# Patient Record
Sex: FEMALE | Race: BLACK OR AFRICAN AMERICAN | NOT HISPANIC OR LATINO | Employment: FULL TIME | URBAN - METROPOLITAN AREA
[De-identification: names, ages, dates, MRNs, and addresses within clinical notes are randomized per-mention and may not be internally consistent; named-entity substitution may affect disease eponyms.]

---

## 2023-11-09 ENCOUNTER — OFFICE VISIT (OUTPATIENT)
Dept: OBSTETRICS AND GYNECOLOGY | Facility: CLINIC | Age: 43
End: 2023-11-09
Payer: COMMERCIAL

## 2023-11-09 VITALS
HEIGHT: 69 IN | DIASTOLIC BLOOD PRESSURE: 70 MMHG | SYSTOLIC BLOOD PRESSURE: 130 MMHG | WEIGHT: 293 LBS | BODY MASS INDEX: 43.4 KG/M2

## 2023-11-09 DIAGNOSIS — N92.0 MENORRHAGIA WITH REGULAR CYCLE: ICD-10-CM

## 2023-11-09 DIAGNOSIS — D50.0 IRON DEFICIENCY ANEMIA DUE TO CHRONIC BLOOD LOSS: ICD-10-CM

## 2023-11-09 DIAGNOSIS — D21.9 FIBROID: Primary | ICD-10-CM

## 2023-11-09 PROBLEM — I61.0 BASAL GANGLIA HEMORRHAGE (MULTI): Status: ACTIVE | Noted: 2023-11-09

## 2023-11-09 PROBLEM — N85.2 ENLARGED UTERUS: Status: ACTIVE | Noted: 2023-11-09

## 2023-11-09 PROBLEM — O09.892: Status: ACTIVE | Noted: 2023-11-09

## 2023-11-09 PROBLEM — N80.00 UTERUS, ADENOMYOSIS: Status: ACTIVE | Noted: 2023-11-09

## 2023-11-09 PROBLEM — Z86.718: Status: ACTIVE | Noted: 2023-11-09

## 2023-11-09 PROBLEM — B97.7 HPV IN FEMALE: Status: ACTIVE | Noted: 2023-11-09

## 2023-11-09 PROBLEM — I10 HYPERTENSION: Status: ACTIVE | Noted: 2023-11-09

## 2023-11-09 PROBLEM — R87.810 ASCUS WITH POSITIVE HIGH RISK HPV CERVICAL: Status: ACTIVE | Noted: 2023-11-09

## 2023-11-09 PROBLEM — D25.9 FIBROID UTERUS: Status: ACTIVE | Noted: 2023-11-09

## 2023-11-09 PROBLEM — R87.610 ASCUS WITH POSITIVE HIGH RISK HPV CERVICAL: Status: ACTIVE | Noted: 2023-11-09

## 2023-11-09 PROBLEM — I61.9 HEMORRHAGIC STROKE (MULTI): Status: ACTIVE | Noted: 2023-11-09

## 2023-11-09 PROCEDURE — 3078F DIAST BP <80 MM HG: CPT | Performed by: OBSTETRICS & GYNECOLOGY

## 2023-11-09 PROCEDURE — 3075F SYST BP GE 130 - 139MM HG: CPT | Performed by: OBSTETRICS & GYNECOLOGY

## 2023-11-09 PROCEDURE — 1036F TOBACCO NON-USER: CPT | Performed by: OBSTETRICS & GYNECOLOGY

## 2023-11-09 PROCEDURE — 99214 OFFICE O/P EST MOD 30 MIN: CPT | Performed by: OBSTETRICS & GYNECOLOGY

## 2023-11-09 RX ORDER — ATORVASTATIN CALCIUM 10 MG/1
10 TABLET, FILM COATED ORAL DAILY
COMMUNITY

## 2023-11-09 RX ORDER — FERROUS SULFATE 325(65) MG
TABLET ORAL
Status: ON HOLD | COMMUNITY
Start: 2021-09-21 | End: 2024-02-01 | Stop reason: ENTERED-IN-ERROR

## 2023-11-09 RX ORDER — LABETALOL 200 MG/1
200 TABLET, FILM COATED ORAL 2 TIMES DAILY
COMMUNITY
Start: 2019-10-14 | End: 2023-11-09 | Stop reason: ALTCHOICE

## 2023-11-09 RX ORDER — VITAMIN E 268 MG
2 CAPSULE ORAL NIGHTLY
COMMUNITY
End: 2023-11-09 | Stop reason: ALTCHOICE

## 2023-11-09 RX ORDER — AMLODIPINE BESYLATE 10 MG/1
10 TABLET ORAL DAILY
COMMUNITY
End: 2023-11-09 | Stop reason: ALTCHOICE

## 2023-11-09 RX ORDER — IBUPROFEN 100 MG/5ML
SUSPENSION, ORAL (FINAL DOSE FORM) ORAL
COMMUNITY
End: 2023-11-09 | Stop reason: ALTCHOICE

## 2023-11-09 RX ORDER — GLUC/MSM/COLGN2/HYAL/ANTIARTH3 375-375-20
TABLET ORAL
COMMUNITY
End: 2023-11-09 | Stop reason: ALTCHOICE

## 2023-11-09 RX ORDER — WARFARIN SODIUM 5 MG/1
15 TABLET ORAL DAILY
COMMUNITY
Start: 2023-04-11 | End: 2024-02-07 | Stop reason: HOSPADM

## 2023-11-09 RX ORDER — ASCORBIC ACID 250 MG
250 TABLET ORAL DAILY
COMMUNITY
Start: 2021-09-21

## 2023-11-09 RX ORDER — MULTIVITAMIN
1 TABLET ORAL DAILY
COMMUNITY

## 2023-11-09 RX ORDER — HYDROCHLOROTHIAZIDE 12.5 MG/1
12.5 CAPSULE ORAL DAILY
COMMUNITY
Start: 2023-03-15 | End: 2023-12-21 | Stop reason: ALTCHOICE

## 2023-11-09 RX ORDER — ASPIRIN 325 MG
1 TABLET, DELAYED RELEASE (ENTERIC COATED) ORAL WEEKLY
COMMUNITY
Start: 2021-09-21 | End: 2023-12-21 | Stop reason: ALTCHOICE

## 2023-11-09 RX ORDER — HYDROCHLOROTHIAZIDE 25 MG/1
25 TABLET ORAL
COMMUNITY
Start: 2023-05-01

## 2023-11-09 RX ORDER — LABETALOL 300 MG/1
1 TABLET, FILM COATED ORAL 2 TIMES DAILY
COMMUNITY

## 2023-11-09 RX ORDER — ACETAMINOPHEN 500 MG
1000 TABLET ORAL EVERY 8 HOURS
COMMUNITY
Start: 2019-10-03 | End: 2023-11-09 | Stop reason: ALTCHOICE

## 2023-11-09 RX ORDER — AMLODIPINE BESYLATE 5 MG/1
1 TABLET ORAL DAILY
Status: ON HOLD | COMMUNITY
Start: 2019-10-14 | End: 2024-02-01 | Stop reason: ENTERED-IN-ERROR

## 2023-11-09 RX ORDER — AMOXICILLIN 250 MG
2 CAPSULE ORAL 2 TIMES DAILY
COMMUNITY
Start: 2019-10-14 | End: 2023-11-09 | Stop reason: ALTCHOICE

## 2023-11-09 RX ORDER — GLUCOSAMINE/CHONDR SU A SOD 750-600 MG
2 TABLET ORAL NIGHTLY
COMMUNITY
Start: 2021-09-21 | End: 2023-11-09 | Stop reason: ALTCHOICE

## 2023-11-09 RX ORDER — WARFARIN 10 MG/1
TABLET ORAL
COMMUNITY
End: 2023-11-09 | Stop reason: ALTCHOICE

## 2023-11-09 NOTE — PROGRESS NOTES
Subjective   Patient ID: Katarina Merino is a 43 y.o. female who presents for Consult (Wants Hysterectomy ).      Patient partner has had a vasectomy  Cycles are every month but last for 6 to 7 days  Is on Coumadin due to her history of a stroke  Would like a hysterectomy  Is also anemic - is doing iron transfusions weekly  Using a pad and changing every 60-90 min on heavy days  Is dizzy    Discussed with patient we will need to evaluate the lining of the uterus  We will schedule EMB hysteroscopy after her next cycle  Cannot take Advil but will take Tylenol prior to the procedure    Is not interested in an IUD or progesterone for cycle control  Discussed the possibility of Depo-Provera if her biopsy comes back normal  Ablation versus hysterectomy reviewed with patient.  She like hysterectomy  Would like to have it done soon however my first available is January 24.  Patient will consider    ROS  All Normal Review of Systems  Constitutional: no fever, no chills, no recent weight gain, no recent weight loss and no fatigue.    Gastrointestinal: no abdominal pain, no constipation, no nausea, no diarrhea and no vomiting.    Genitourinary: no dysuria, no urinary incontinence, no vaginal dryness, no vaginal itching, no dyspareunia, no pelvic pain, no dysmenorrhea, no sexual problems, no change in urinary frequency, no vaginal discharge, no unexplained vaginal bleeding and no lesion/sore.    Breasts: No masses.  No nipple discharge.  No redness    Imaging:  us 2021  FINDINGS:  UTERUS:  The uterus measures 9.4 x 8.7 x 9.1 cm. Heterogeneous hypoechoic  appearing myometrium with areas of pencil shadowing artifact and  subendometrial cystic foci. Suspected pedunculated subserosal  leiomyoma in the left aspect of the fundus measures 9.7 cm.     ENDOMETRIUM:  Normal thickness, measuring 9 mm.     RIGHT ADNEXA:  The right ovary is not seen.     LEFT ADNEXA:  The left ovary is not seen.      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  Is getting weekly iron transfusions  Discussed with patient options moving forward including medical and surgical options.  She is not interested in an IUD or progesterone at this time  Would like a hysterectomy.  Is not interested in ablation  We need to evaluate the lining of the uterus prior.  We will schedule EMB hysteroscopy to evaluate the lining of the uterus after her next menstrual cycle  We will take Tylenol prior to the procedure  We can consider possibly Depo-Provera to minimize her bleeding until her upcoming surgery    First date available for surgery is January 24.  Patient will consider her options and follow-up    Thank you again for your visit to our office today

## 2023-11-10 ENCOUNTER — TELEPHONE (OUTPATIENT)
Dept: OBSTETRICS AND GYNECOLOGY | Facility: CLINIC | Age: 43
End: 2023-11-10
Payer: COMMERCIAL

## 2023-11-10 NOTE — TELEPHONE ENCOUNTER
Katarina McPherson called evaluated her options and would like to continue with surgery for January 24, 2023.      TANISHA MENCHACA MA

## 2023-11-13 ENCOUNTER — PREP FOR PROCEDURE (OUTPATIENT)
Dept: OBSTETRICS AND GYNECOLOGY | Facility: CLINIC | Age: 43
End: 2023-11-13
Payer: COMMERCIAL

## 2023-11-13 DIAGNOSIS — D21.9 FIBROID: Primary | ICD-10-CM

## 2023-11-13 DIAGNOSIS — N92.0 MENORRHAGIA WITH REGULAR CYCLE: ICD-10-CM

## 2023-12-19 ENCOUNTER — APPOINTMENT (OUTPATIENT)
Dept: RADIOLOGY | Facility: CLINIC | Age: 43
End: 2023-12-19
Payer: COMMERCIAL

## 2023-12-19 DIAGNOSIS — Z12.31 SCREENING MAMMOGRAM FOR BREAST CANCER: ICD-10-CM

## 2023-12-21 ENCOUNTER — PROCEDURE VISIT (OUTPATIENT)
Dept: OBSTETRICS AND GYNECOLOGY | Facility: CLINIC | Age: 43
End: 2023-12-21
Payer: COMMERCIAL

## 2023-12-21 VITALS
DIASTOLIC BLOOD PRESSURE: 86 MMHG | BODY MASS INDEX: 43.4 KG/M2 | HEIGHT: 69 IN | SYSTOLIC BLOOD PRESSURE: 150 MMHG | WEIGHT: 293 LBS

## 2023-12-21 DIAGNOSIS — Z32.02 PREGNANCY TEST NEGATIVE: ICD-10-CM

## 2023-12-21 DIAGNOSIS — D21.9 FIBROID: ICD-10-CM

## 2023-12-21 DIAGNOSIS — D50.0 IRON DEFICIENCY ANEMIA DUE TO CHRONIC BLOOD LOSS: ICD-10-CM

## 2023-12-21 DIAGNOSIS — Z32.02 ENCOUNTER FOR PREGNANCY TEST WITH RESULT NEGATIVE: ICD-10-CM

## 2023-12-21 DIAGNOSIS — N92.0 MENORRHAGIA WITH REGULAR CYCLE: Primary | ICD-10-CM

## 2023-12-21 LAB — PREGNANCY TEST URINE, POC: NEGATIVE

## 2023-12-21 PROCEDURE — 81025 URINE PREGNANCY TEST: CPT | Performed by: OBSTETRICS & GYNECOLOGY

## 2023-12-21 PROCEDURE — 0753T DGTZ GLS MCRSCP SLD LEVEL IV: CPT

## 2023-12-21 PROCEDURE — 88305 TISSUE EXAM BY PATHOLOGIST: CPT

## 2023-12-21 PROCEDURE — 58558 HYSTEROSCOPY BIOPSY: CPT | Performed by: OBSTETRICS & GYNECOLOGY

## 2023-12-21 PROCEDURE — 88305 TISSUE EXAM BY PATHOLOGIST: CPT | Performed by: PATHOLOGY

## 2023-12-21 NOTE — PROGRESS NOTES
EMB hysteroscopy  Patient with menorrhagia and fibroids  Is on Coumadin due to history of a stroke postpartum    Questions answered and consent signed    Procedure:  Speculum placed  Betadine prep of cervix  Anterior lip of cervix grasped with ring forcep  Uterus sounded to 9 cm  Hysteroscope inserted  Saline uses medium  Normal ostia seen bilaterally  No irregularities to cavity  Hysteroscope used to remove saline via suction  Hysteroscope removed  Patient tolerated procedure well  No complications    Endometrial biopsy completedPatient ID: Katarina Merino is a 43 y.o. female.    Endometrial biopsy    Date/Time: 12/21/2023 2:53 PM    Performed by: Kenroy Naidu MD  Authorized by: Kenroy Naidu MD    Consent:     Consent obtained: written    Consent given by: patient  Indications:     Indications: abnormal uterine bleeding    Procedure:     Prepped with: Betadine    Tenaculum used: no      A local block was performed: no      Cervix dilated: no      Number of passes: 1  Findings:     Cervix: normal      Specimen collected: specimen collected and sent to pathology      Patient tolerance: tolerated well, no immediate complications      Acute for your visit to our office today  I am glad you tolerated procedures well  The results will return in 2 to 3 weeks.  I look forward to seeing you for your preop visit as you have surgery scheduled for January 24

## 2023-12-30 LAB
LABORATORY COMMENT REPORT: NORMAL
PATH REPORT.FINAL DX SPEC: NORMAL
PATH REPORT.GROSS SPEC: NORMAL
PATH REPORT.RELEVANT HX SPEC: NORMAL
PATH REPORT.TOTAL CANCER: NORMAL

## 2024-01-08 ENCOUNTER — PREP FOR PROCEDURE (OUTPATIENT)
Dept: OBSTETRICS AND GYNECOLOGY | Facility: CLINIC | Age: 44
End: 2024-01-08

## 2024-01-08 ENCOUNTER — OFFICE VISIT (OUTPATIENT)
Dept: OBSTETRICS AND GYNECOLOGY | Facility: CLINIC | Age: 44
End: 2024-01-08
Payer: COMMERCIAL

## 2024-01-08 VITALS
HEIGHT: 69 IN | BODY MASS INDEX: 43.4 KG/M2 | DIASTOLIC BLOOD PRESSURE: 72 MMHG | WEIGHT: 293 LBS | SYSTOLIC BLOOD PRESSURE: 130 MMHG

## 2024-01-08 DIAGNOSIS — N92.0 MENORRHAGIA WITH REGULAR CYCLE: Primary | ICD-10-CM

## 2024-01-08 DIAGNOSIS — D21.9 FIBROID: ICD-10-CM

## 2024-01-08 DIAGNOSIS — D50.0 IRON DEFICIENCY ANEMIA DUE TO CHRONIC BLOOD LOSS: ICD-10-CM

## 2024-01-08 PROCEDURE — 99214 OFFICE O/P EST MOD 30 MIN: CPT | Performed by: OBSTETRICS & GYNECOLOGY

## 2024-01-08 PROCEDURE — 3075F SYST BP GE 130 - 139MM HG: CPT | Performed by: OBSTETRICS & GYNECOLOGY

## 2024-01-08 PROCEDURE — 3078F DIAST BP <80 MM HG: CPT | Performed by: OBSTETRICS & GYNECOLOGY

## 2024-01-08 PROCEDURE — 1036F TOBACCO NON-USER: CPT | Performed by: OBSTETRICS & GYNECOLOGY

## 2024-01-08 NOTE — PROGRESS NOTES
Robotic assisted total laparoscopic hysterectomy bilateral salpingectomy cystoscopy and tap block on January history Of Present Illness  Katarina Merino is a 43 y.o. female presenting for Preop visit.  Patient is scheduled for robotic assisted total laparoscopic hysterectomy bilateral salpingectomy cystoscopy and tap block on .  It was 9 x 9 x 8 cm fibroid uterus and menorrhagia.  Patient is also on Coumadin due to history of stroke    Will have preadmission testing prior to surgery  Bowel prep day prior to surgery  Risks and alternatives reviewed with patient.  Questions answered and consent signed.  Past Medical History  Stroke postpartum    Surgical History  She has a past surgical history that includes Umbilical hernia repair (2016) and CT angio head w and wo IV contrast (10/8/2019).   C/d x1    OB History  OB History    Para Term  AB Living   2 2           SAB IAB Ectopic Multiple Live Births           2      # Outcome Date GA Lbr Alcides/2nd Weight Sex Delivery Anes PTL Lv   2 Para      Vag-Spont      1 Para      Vag-Spont          Social History  She reports that she has never smoked. She has never used smokeless tobacco. No history on file for alcohol use and drug use.    Family History  Family History   Problem Relation Name Age of Onset    Diabetes Mother      Hypertension Father      Breast cancer Other Aunt         Allergies  Penicillins    Review of Systems     Physical Exam  General: No distress.  Alert and oriented  Heart: Regular rate and rhythm  Lungs: Clear to auscultation bilaterally  Abdomen: Soft, nontender, nondistended  External Genitalia:  no masses.  no erythema.  no discharge noted.  Vagina:  no masses.  no discharge noted.    Cervix: no masses.    Uterus:  normal size and contour.  no masses. palpated  Adnexa: R: no masses, non tender.    Adnexa: L: no masses.  non tender  Extremities: No swelling  Psych: No sadness.         Last Recorded Vitals  There were no  vitals taken for this visit.    Relevant Results  Medications    Current Outpatient Medications:     amLODIPine (Norvasc) 5 mg tablet, Take 1 tablet (5 mg) by mouth once daily., Disp: , Rfl:     ascorbic acid (Vitamin C) 250 mg tablet, TAKE 1 TABLET BY MOUTH EVERY DAY WITH IRON SUPPLEMENT, Disp: , Rfl:     atorvastatin (Lipitor) 10 mg tablet, Take 1 tablet (10 mg) by mouth once daily., Disp: , Rfl:     ferrous sulfate 325 (65 Fe) MG tablet, TAKE 2 TABLETS BY MOUTH EVERY DAY WITH VITAMIN-CAPSULE TABLET, Disp: , Rfl:     hydroCHLOROthiazide (HYDRODiuril) 25 mg tablet, Take 1 tablet (25 mg) by mouth once daily., Disp: , Rfl:     labetalol (Normodyne) 300 mg tablet, Take 1 tablet (300 mg) by mouth 2 times a day., Disp: , Rfl:     multivitamin tablet, Multivitamins TABS Refills: 0, Disp: , Rfl:     warfarin (Coumadin) 5 mg tablet, Take 15 mg every day OR AS DIRECTED BY COUMADIN CLINIC, Disp: , Rfl:     Imaging    UTERUS:  The uterus measures 9.4 x 8.7 x 9.1 cm. Heterogeneous hypoechoic  appearing myometrium with areas of pencil shadowing artifact and  subendometrial cystic foci. Suspected pedunculated subserosal  leiomyoma in the left aspect of the fundus measures 9.7 cm.     ENDOMETRIUM:  Normal thickness, measuring 9 mm.     RIGHT ADNEXA:  The right ovary is not seen.     LEFT ADNEXA:  The left ovary is not seen.     CUL DE SAC:  Trace pelvic free fluid.       Path:  Endometrium, biopsy:  -- Scant fragments of proliferative endometrium.        Assessment and Plan:  1) Preop visit    Thank you for your visit to our office today  Today we discussed her surgery which is scheduled on January 24.You are scheduled for a robotic assisted total laparoscopic hysterectomy cystoscopy bilateral salpingectomy tap block.  Your preop testing through preadmission testing  Do not take any NSAIDs for a week prior to surgery  Bowel prep day prior to surgery  Nothing to eat or drink after midnight the night prior to surgery  This will be  outpatient surgery at [facility]. You will need a responsible adult to bring into the hospital, take you home, spend the night of surgery at home with you  We discussed risk of surgery which include infection hemorrhage injury to internal organs laparotomy anesthesia and death  I encourage pelvic rest and 20 pound restriction for 8 weeks after surgery  Please follow-up 2 weeks after surgery    Thank you again for your visit to our office today     I spent 35 minutes in the professional and overall care of this patient.      Kenroy Naidu MD

## 2024-01-08 NOTE — H&P (VIEW-ONLY)
Robotic assisted total laparoscopic hysterectomy bilateral salpingectomy cystoscopy and tap block on January history Of Present Illness  Katarina Merino is a 43 y.o. female presenting for Preop visit.  Patient is scheduled for robotic assisted total laparoscopic hysterectomy bilateral salpingectomy cystoscopy and tap block on .  It was 9 x 9 x 8 cm fibroid uterus and menorrhagia.  Patient is also on Coumadin due to history of stroke    Will have preadmission testing prior to surgery  Bowel prep day prior to surgery  Risks and alternatives reviewed with patient.  Questions answered and consent signed.  Past Medical History  Stroke postpartum    Surgical History  She has a past surgical history that includes Umbilical hernia repair (2016) and CT angio head w and wo IV contrast (10/8/2019).   C/d x1    OB History  OB History    Para Term  AB Living   2 2           SAB IAB Ectopic Multiple Live Births           2      # Outcome Date GA Lbr Alcides/2nd Weight Sex Delivery Anes PTL Lv   2 Para      Vag-Spont      1 Para      Vag-Spont          Social History  She reports that she has never smoked. She has never used smokeless tobacco. No history on file for alcohol use and drug use.    Family History  Family History   Problem Relation Name Age of Onset    Diabetes Mother      Hypertension Father      Breast cancer Other Aunt         Allergies  Penicillins    Review of Systems     Physical Exam  General: No distress.  Alert and oriented  Heart: Regular rate and rhythm  Lungs: Clear to auscultation bilaterally  Abdomen: Soft, nontender, nondistended  External Genitalia:  no masses.  no erythema.  no discharge noted.  Vagina:  no masses.  no discharge noted.    Cervix: no masses.    Uterus:  normal size and contour.  no masses. palpated  Adnexa: R: no masses, non tender.    Adnexa: L: no masses.  non tender  Extremities: No swelling  Psych: No sadness.         Last Recorded Vitals  There were no  vitals taken for this visit.    Relevant Results  Medications    Current Outpatient Medications:     amLODIPine (Norvasc) 5 mg tablet, Take 1 tablet (5 mg) by mouth once daily., Disp: , Rfl:     ascorbic acid (Vitamin C) 250 mg tablet, TAKE 1 TABLET BY MOUTH EVERY DAY WITH IRON SUPPLEMENT, Disp: , Rfl:     atorvastatin (Lipitor) 10 mg tablet, Take 1 tablet (10 mg) by mouth once daily., Disp: , Rfl:     ferrous sulfate 325 (65 Fe) MG tablet, TAKE 2 TABLETS BY MOUTH EVERY DAY WITH VITAMIN-CAPSULE TABLET, Disp: , Rfl:     hydroCHLOROthiazide (HYDRODiuril) 25 mg tablet, Take 1 tablet (25 mg) by mouth once daily., Disp: , Rfl:     labetalol (Normodyne) 300 mg tablet, Take 1 tablet (300 mg) by mouth 2 times a day., Disp: , Rfl:     multivitamin tablet, Multivitamins TABS Refills: 0, Disp: , Rfl:     warfarin (Coumadin) 5 mg tablet, Take 15 mg every day OR AS DIRECTED BY COUMADIN CLINIC, Disp: , Rfl:     Imaging    UTERUS:  The uterus measures 9.4 x 8.7 x 9.1 cm. Heterogeneous hypoechoic  appearing myometrium with areas of pencil shadowing artifact and  subendometrial cystic foci. Suspected pedunculated subserosal  leiomyoma in the left aspect of the fundus measures 9.7 cm.     ENDOMETRIUM:  Normal thickness, measuring 9 mm.     RIGHT ADNEXA:  The right ovary is not seen.     LEFT ADNEXA:  The left ovary is not seen.     CUL DE SAC:  Trace pelvic free fluid.       Path:  Endometrium, biopsy:  -- Scant fragments of proliferative endometrium.        Assessment and Plan:  1) Preop visit    Thank you for your visit to our office today  Today we discussed her surgery which is scheduled on January 24.You are scheduled for a robotic assisted total laparoscopic hysterectomy cystoscopy bilateral salpingectomy tap block.  Your preop testing through preadmission testing  Do not take any NSAIDs for a week prior to surgery  Bowel prep day prior to surgery  Nothing to eat or drink after midnight the night prior to surgery  This will be  outpatient surgery at [facility]. You will need a responsible adult to bring into the hospital, take you home, spend the night of surgery at home with you  We discussed risk of surgery which include infection hemorrhage injury to internal organs laparotomy anesthesia and death  I encourage pelvic rest and 20 pound restriction for 8 weeks after surgery  Please follow-up 2 weeks after surgery    Thank you again for your visit to our office today     I spent 35 minutes in the professional and overall care of this patient.      Kenroy Naidu MD

## 2024-01-09 ENCOUNTER — APPOINTMENT (OUTPATIENT)
Dept: OBSTETRICS AND GYNECOLOGY | Facility: CLINIC | Age: 44
End: 2024-01-09
Payer: COMMERCIAL

## 2024-01-10 ENCOUNTER — ANCILLARY PROCEDURE (OUTPATIENT)
Dept: RADIOLOGY | Facility: CLINIC | Age: 44
End: 2024-01-10
Payer: COMMERCIAL

## 2024-01-10 ENCOUNTER — APPOINTMENT (OUTPATIENT)
Dept: PREADMISSION TESTING | Facility: HOSPITAL | Age: 44
End: 2024-01-10
Payer: COMMERCIAL

## 2024-01-10 DIAGNOSIS — Z12.31 SCREENING MAMMOGRAM FOR BREAST CANCER: ICD-10-CM

## 2024-01-10 PROCEDURE — 77067 SCR MAMMO BI INCL CAD: CPT

## 2024-01-10 PROCEDURE — 77063 BREAST TOMOSYNTHESIS BI: CPT | Performed by: RADIOLOGY

## 2024-01-10 PROCEDURE — 77067 SCR MAMMO BI INCL CAD: CPT | Performed by: RADIOLOGY

## 2024-01-15 ENCOUNTER — PRE-ADMISSION TESTING (OUTPATIENT)
Dept: PREADMISSION TESTING | Facility: HOSPITAL | Age: 44
End: 2024-01-15
Payer: COMMERCIAL

## 2024-01-15 ENCOUNTER — LAB (OUTPATIENT)
Dept: LAB | Facility: LAB | Age: 44
End: 2024-01-15
Payer: COMMERCIAL

## 2024-01-15 VITALS
DIASTOLIC BLOOD PRESSURE: 90 MMHG | HEIGHT: 69 IN | WEIGHT: 293 LBS | RESPIRATION RATE: 16 BRPM | SYSTOLIC BLOOD PRESSURE: 151 MMHG | HEART RATE: 66 BPM | BODY MASS INDEX: 43.4 KG/M2 | OXYGEN SATURATION: 100 % | TEMPERATURE: 97.9 F

## 2024-01-15 DIAGNOSIS — Z79.01 ANTICOAGULATED ON COUMADIN: ICD-10-CM

## 2024-01-15 DIAGNOSIS — Z01.818 PREOP TESTING: ICD-10-CM

## 2024-01-15 DIAGNOSIS — Z01.818 PREOP TESTING: Primary | ICD-10-CM

## 2024-01-15 LAB
ABO GROUP (TYPE) IN BLOOD: NORMAL
ANTIBODY SCREEN: NORMAL
APTT PPP: 38 SECONDS (ref 27–38)
ATRIAL RATE: 68 BPM
INR PPP: 1.3 (ref 0.9–1.1)
P AXIS: 38 DEGREES
P OFFSET: 199 MS
P ONSET: 137 MS
PR INTERVAL: 164 MS
PROTHROMBIN TIME: 15.1 SECONDS (ref 9.8–12.8)
Q ONSET: 219 MS
QRS COUNT: 11 BEATS
QRS DURATION: 86 MS
QT INTERVAL: 398 MS
QTC CALCULATION(BAZETT): 423 MS
QTC FREDERICIA: 415 MS
R AXIS: 51 DEGREES
RH FACTOR (ANTIGEN D): NORMAL
T AXIS: 20 DEGREES
T OFFSET: 418 MS
VENTRICULAR RATE: 68 BPM

## 2024-01-15 PROCEDURE — 86900 BLOOD TYPING SEROLOGIC ABO: CPT

## 2024-01-15 PROCEDURE — 86901 BLOOD TYPING SEROLOGIC RH(D): CPT

## 2024-01-15 PROCEDURE — 99204 OFFICE O/P NEW MOD 45 MIN: CPT | Performed by: PHYSICIAN ASSISTANT

## 2024-01-15 PROCEDURE — 86850 RBC ANTIBODY SCREEN: CPT

## 2024-01-15 PROCEDURE — 85610 PROTHROMBIN TIME: CPT

## 2024-01-15 PROCEDURE — 85730 THROMBOPLASTIN TIME PARTIAL: CPT

## 2024-01-15 PROCEDURE — 93005 ELECTROCARDIOGRAM TRACING: CPT | Performed by: PHYSICIAN ASSISTANT

## 2024-01-15 PROCEDURE — 36415 COLL VENOUS BLD VENIPUNCTURE: CPT

## 2024-01-15 ASSESSMENT — ENCOUNTER SYMPTOMS
ALLERGIC/IMMUNOLOGIC NEGATIVE: 1
EYES NEGATIVE: 1
HEMATOLOGIC/LYMPHATIC NEGATIVE: 1
CARDIOVASCULAR NEGATIVE: 1
GASTROINTESTINAL NEGATIVE: 1
PSYCHIATRIC NEGATIVE: 1
ENDOCRINE NEGATIVE: 1
NEUROLOGICAL NEGATIVE: 1
MUSCULOSKELETAL NEGATIVE: 1
CONSTITUTIONAL NEGATIVE: 1
RESPIRATORY NEGATIVE: 1

## 2024-01-15 NOTE — PREPROCEDURE INSTRUCTIONS
Medication List            Accurate as of January 15, 2024  1:19 PM. Always use your most recent med list.                amLODIPine 5 mg tablet  Commonly known as: Norvasc  Medication Adjustments for Surgery: Take morning of surgery with sip of water, no other fluids     ascorbic acid 250 mg tablet  Commonly known as: Vitamin C  Medication Adjustments for Surgery: Continue until night before surgery     atorvastatin 10 mg tablet  Commonly known as: Lipitor  Medication Adjustments for Surgery: Take morning of surgery with sip of water, no other fluids     ferrous sulfate (325 mg ferrous sulfate) tablet  Medication Adjustments for Surgery: Continue until night before surgery     hydroCHLOROthiazide 25 mg tablet  Commonly known as: HYDRODiuril  Medication Adjustments for Surgery: Take morning of surgery with sip of water, no other fluids     labetalol 300 mg tablet  Commonly known as: Normodyne  Medication Adjustments for Surgery: Take morning of surgery with sip of water, no other fluids     multivitamin tablet  Medication Adjustments for Surgery: Stop 7 days before surgery     warfarin 5 mg tablet  Commonly known as: Coumadin  Notes to patient: Stopping for surgery with lovenox bridge from Dr. Escamilla                CONTACT SURGEON'S OFFICE IF YOU DEVELOP:  * Fever = 100.4 F   * New respiratory symptoms (e.g. cough, shortness of breath, respiratory distress, sore throat)  * Recent loss of taste or smell  *Flu like symptoms such as headache, fatigue or gastrointestinal symptoms  * You develop any open sores, shingles, burning or painful urination   AND/OR:  * You no longer wish to have the surgery.  * Any other personal circumstances change that may lead to the need to cancel or defer this surgery.  *You were admitted to any hospital within one week of your planned procedure.    SMOKING:  *Quitting smoking can make a huge difference to your health and recovery from surgery.    *If you need help with quitting, call  1-800-QUIT-NOW.    THE DAY BEFORE SURGERY:  *Do not eat any food after midnight the night before surgery.   *You are permitted to drink clear liquids (i.e. water, black coffee, tea, clear broth, apple juice) up to 2 hours before your surgery.    DIABETICS:  Nothing by mouth (solid or liquid) for 8 hours prior to surgery. Please check fasting blood sugar upon waking up.  If fasting sugar is <80 mg/dl, please drink 100ml/3oz of apple juice no later than 2 hours prior to surgery.      SURGICAL TIME  *You will be contacted between 2 p.m. and 6 p.m. the business day before your surgery with your arrival time.  *If you haven't received a call by 6pm, call [phone].  *Scheduled surgery times may change and you will be notified if this occurs-check your personal voicemail for any updates.    ON THE MORNING OF SURGERY:  *Wear comfortable, loose fitting clothing.   *Do not use moisturizers, creams, lotions or perfume.  *All jewelry and valuables should be left at home.  *Prosthetic devices such as contact lenses, hearing aids, dentures, eyelash extensions, hairpins and body piercing must be removed before surgery.    BRING WITH YOU:  *Photo ID and insurance card  *Current list of medicines and allergies  *Pacemaker/Defibrillator/Heart stent cards  *CPAP machine and mask  *Slings/splints/crutches  *Copy of your complete Advanced Directive/DHPOA-if applicable  *Neurostimulator implant remote    PARKING AND ARRIVAL:  *Check in at the Main Entrance desk and let them know you are here for surgery.  *You will be directed to the 2nd floor surgical waiting area.    AFTER OUTPATIENT SURGERY:  *A responsible adult MUST accompany you at the time of discharge and stay with you for 24 hours after your surgery.  *You may NOT drive yourself home after surgery.  *You may use a taxi or ride sharing service ([employer], [employer]) to return home ONLY if you are accompanied by a friend or family member.  *Instructions for resuming your medications  will be provided by your surgeon.

## 2024-01-15 NOTE — CPM/PAT H&P
[facility]/PAT Evaluation       Name: Katarina Merino (Katarina Merino)  /Age: 1980/43 y.o.     In-Person       Chief Complaint: Fibroid ;Menorrhagia with regular cycle     HPI    Date of Consult: 1/15/24    Referring Provider: Dr. Naidu    Surgery, Date, and Length: Robot Assisted Laparoscopic Total Hysterectomy; Bilateral Salpingectomy ; 24; 120 minutes    Katarina Merino is a 43 year-old female who presents to the [facility] for perioperative risk assessment prior to surgery.  She has been having extensive bleeding during her periods which she has needed IV iron.  Patient is .  Presently complaining of issues with cycles past 4 years.  Regular timing every 28 days lasting 7-8 days with heavy bleeding and passing of clots.  She is currently on Lovenox bridge for her surgery and will restart the Warfarin after as directed.     This note was created in part upon personal review of patient's medical records.      Patient is scheduled to have Robot Assisted Laparoscopic Total Hysterectomy; Bilateral Salpingectomy     Medical History  Past Medical History:   Diagnosis Date    Chronic deep vein thrombosis (DVT) of distal vein of lower extremity, unspecified laterality (CMS/HCC)     follows with hematology Dr. So Escamilla MD    Fibroid     Iron deficiency anemia due to chronic blood loss     Long term current use of anticoagulant     warfarin    Menorrhagia with regular cycle         STOP BANG = 1    Caprini = 4    Surgical History  Past Surgical History:   Procedure Laterality Date     SECTION, LOW TRANSVERSE      CT HEAD ANGIO W AND WO IV CONTRAST  10/08/2019    CT HEAD ANGIO W AND WO IV CONTRAST 10/8/2019 [facility]    UMBILICAL HERNIA REPAIR               Family history:  Family History   Problem Relation Name Age of Onset    Diabetes Mother      Hypertension Mother      Hypertension Father      Hyperlipidemia Father      Hypertension Brother      Hyperlipidemia Brother      Breast cancer  "Other Aunt         Social history:  Social History     Socioeconomic History    Marital status:      Spouse name: Not on file    Number of children: Not on file    Years of education: Not on file    Highest education level: Not on file   Occupational History    Not on file   Tobacco Use    Smoking status: Never    Smokeless tobacco: Never   Vaping Use    Vaping Use: Never used   Substance and Sexual Activity    Alcohol use: Yes     Alcohol/week: 2.0 standard drinks of alcohol     Types: 2 Standard drinks or equivalent per week    Drug use: Never    Sexual activity: Defer   Other Topics Concern    Not on file   Social History Narrative    Not on file     Social Determinants of Health     Financial Resource Strain: Not on file   Food Insecurity: Not on file   Transportation Needs: Not on file   Physical Activity: Not on file   Stress: Not on file   Social Connections: Not on file   Intimate Partner Violence: Not on file   Housing Stability: Not on file          Current Outpatient Medications:     amLODIPine (Norvasc) 5 mg tablet, Take 1 tablet (5 mg) by mouth once daily., Disp: , Rfl:     ascorbic acid (Vitamin C) 250 mg tablet, TAKE 1 TABLET BY MOUTH EVERY DAY WITH IRON SUPPLEMENT, Disp: , Rfl:     atorvastatin (Lipitor) 10 mg tablet, Take 1 tablet (10 mg) by mouth once daily., Disp: , Rfl:     ferrous sulfate 325 (65 Fe) MG tablet, TAKE 2 TABLETS BY MOUTH EVERY DAY WITH VITAMIN-CAPSULE TABLET, Disp: , Rfl:     hydroCHLOROthiazide (HYDRODiuril) 25 mg tablet, Take 1 tablet (25 mg) by mouth once daily., Disp: , Rfl:     labetalol (Normodyne) 300 mg tablet, Take 1 tablet (300 mg) by mouth 2 times a day., Disp: , Rfl:     multivitamin tablet, Multivitamins TABS Refills: 0, Disp: , Rfl:     warfarin (Coumadin) 5 mg tablet, Take 15 mg every day OR AS DIRECTED BY COUMADIN CLINIC, Disp: , Rfl:        Visit Vitals  /90   Pulse 66   Temp 36.6 °C (97.9 °F)   Resp 16   Ht 1.753 m (5' 9\")   Wt 138 kg (303 lb 5.7 oz) "   LMP 01/07/2024   SpO2 100%   BMI 44.80 kg/m²   OB Status Having periods   Smoking Status Never   BSA 2.59 m²      Review of Systems   Constitutional: Negative.    HENT: Negative.     Eyes: Negative.    Respiratory: Negative.     Cardiovascular: Negative.    Gastrointestinal: Negative.    Endocrine: Negative.    Genitourinary:  Positive for menstrual problem.   Musculoskeletal: Negative.    Skin: Negative.    Allergic/Immunologic: Negative.    Neurological: Negative.    Hematological: Negative.    Psychiatric/Behavioral: Negative.          Physical Exam  Vitals reviewed.   Constitutional:       Appearance: Normal appearance.   HENT:      Head: Normocephalic and atraumatic.      Right Ear: External ear normal.      Left Ear: External ear normal.      Nose: Nose normal.      Mouth/Throat:      Pharynx: Oropharynx is clear.   Eyes:      Extraocular Movements: Extraocular movements intact.      Conjunctiva/sclera: Conjunctivae normal.      Pupils: Pupils are equal, round, and reactive to light.   Cardiovascular:      Rate and Rhythm: Normal rate and regular rhythm.      Heart sounds: Normal heart sounds.   Pulmonary:      Effort: Pulmonary effort is normal.      Breath sounds: Normal breath sounds.   Abdominal:      Palpations: Abdomen is soft.   Musculoskeletal:         General: Normal range of motion.      Cervical back: Normal range of motion and neck supple.   Skin:     General: Skin is warm and dry.   Neurological:      General: No focal deficit present.      Mental Status: She is alert and oriented to person, place, and time.   Psychiatric:         Mood and Affect: Mood normal.         Behavior: Behavior normal.          PAT AIRWAY:   Airway:     Mallampati::  III    Neck ROM::  Full    CBC 1/09/24  WBC  3.70 - 11.00 k/uL 5.76   RBC  3.90 - 5.20 m/uL 5.90 High    Hemoglobin  11.5 - 15.5 g/dL 12.1   Hematocrit  36.0 - 46.0 % 41.1   MCV  80.0 - 100.0 fL 69.7 Low    MCH  26.0 - 34.0 pg 20.5 Low    MCHC  30.5 - 36.0  g/dL 29.4 Low    RDW-CV  11.5 - 15.0 % 20.7 High    Platelet Count  150 - 400 k/uL 254   Comment: No clot detected.   MPV    Comment: Unable to Report.   Neutrophils %  % 57.3   Abs Neut  1.45 - 7.50 k/uL 3.30   Lymphocytes %  % 28.5   Abs Lymph  1.00 - 4.00 k/uL 1.64   Monocytes %  % 9.5   Abs Mono  <0.87 k/uL 0.55   Eosinophils %  % 4.0   Abs Eosin  <0.46 k/uL 0.23   Basophils %  % 0.5   Abs Baso  <0.11 k/uL 0.03   Immature Granulocytes %  % 0.2   Abs Immature Gran  <0.10 k/uL <0.03   NRBC  /100 WBC 0.0   Absolute nRBC  <0.01 k/uL <0.01   Diff Type Auto     CMP 1/09/24  Protein, Total  6.3 - 8.0 g/dL 7.3   Albumin  3.9 - 4.9 g/dL 3.9   Calcium, Total  8.5 - 10.2 mg/dL 9.3   Bilirubin, Total  0.2 - 1.3 mg/dL 0.2   Alkaline Phosphatase  34 - 123 U/L 97   AST  13 - 35 U/L 19   ALT  7 - 38 U/L 17   Glucose  74 - 99 mg/dL 105 High      BUN  7 - 21 mg/dL 7   Creatinine  0.58 - 0.96 mg/dL 0.86   Sodium  136 - 144 mmol/L 136   Potassium  3.7 - 5.1 mmol/L 4.1   Chloride  97 - 105 mmol/L 101   CO2  22 - 30 mmol/L 21 Low    Anion Gap  9 - 18 mmol/L 14   Estimated Glomerular Filtration Rate  >=60 mL/min/1.73m² 87        Protime  9.8 - 12.8 seconds 15.1 High  12.0 R 11.5 R 11.1 R 11.0 R   INR  0.9 - 1.1 1.3 High  1.1 1.0 1.0 1.0   aPTT  27 - 38 seconds 38 31 R, CM 31 R, CM 32 R, CM 33 R, CM     EKG 1/15/24  NSR  Normal  68 BPM        RCRI  1  , 6 % Risk of MACE      Per note PCP Fernando Brooke Pa-C 1/15/24 - This patient is optimally prepared for surgery.     Cardiac  HTN -  continue amlodipine / hydrochlorothiazide / labetalol DOS   HLD -  continue atorvastatin DOS  Hematology   Per Dr. So Escamilla note 1/10/24 - Stop Coumadin 10 days before the surgery and go for CBC and INR on January 22.  Resume Lovenox then Coumadin after surgery once cleared by the surgeon.  Follow-up with the Coumadin clinic within a week after surgery.  Patient has lovenox instructions from [facility].        Patient instructed to ambulate as soon as  possible postoperatively to decrease thromboembolic risk.      Initiate mechanical DVT prophylaxis as soon as possible and initiate chemical prophylaxis when deemed safe from a bleeding standpoint post surgery.       VTE prophylaxis per surgical team       Tests ordered in PAT: coag, t&s,EKG, coag day prior to surgery 1/23/24  LABS REVIEWED from 1/9/24 : unremarkable     Risk assessment complete.  Patient is scheduled for a intermediate surgical risk procedure.        Preoperative medication instructions were provided and reviewed with the patient.  Any additional testing or evaluation was explained to the patient.  Nothing by mouth instructions were discussed and patient's questions were answered prior to conclusion to this encounter.  Patient verbalized understanding of preoperative instructions given in preadmission testing; discharge instructions available in EMR.    This note was dictated by a speech recognition.  Minor errors may have been detected in a speech recognition.

## 2024-01-22 ENCOUNTER — TELEPHONE (OUTPATIENT)
Dept: PREADMISSION TESTING | Facility: HOSPITAL | Age: 44
End: 2024-01-22
Payer: COMMERCIAL

## 2024-01-23 ENCOUNTER — TELEPHONE (OUTPATIENT)
Dept: PREADMISSION TESTING | Facility: HOSPITAL | Age: 44
End: 2024-01-23
Payer: COMMERCIAL

## 2024-01-23 ENCOUNTER — DOCUMENTATION (OUTPATIENT)
Dept: PREADMISSION TESTING | Facility: HOSPITAL | Age: 44
End: 2024-01-23
Payer: COMMERCIAL

## 2024-01-24 ENCOUNTER — ANESTHESIA EVENT (OUTPATIENT)
Dept: OPERATING ROOM | Facility: HOSPITAL | Age: 44
End: 2024-01-24
Payer: COMMERCIAL

## 2024-01-24 ENCOUNTER — ANESTHESIA (OUTPATIENT)
Dept: OPERATING ROOM | Facility: HOSPITAL | Age: 44
End: 2024-01-24
Payer: COMMERCIAL

## 2024-01-24 ENCOUNTER — HOSPITAL ENCOUNTER (OUTPATIENT)
Facility: HOSPITAL | Age: 44
Setting detail: OUTPATIENT SURGERY
Discharge: HOME | End: 2024-01-24
Attending: OBSTETRICS & GYNECOLOGY | Admitting: OBSTETRICS & GYNECOLOGY
Payer: COMMERCIAL

## 2024-01-24 VITALS
BODY MASS INDEX: 43.4 KG/M2 | TEMPERATURE: 97.2 F | HEART RATE: 86 BPM | DIASTOLIC BLOOD PRESSURE: 67 MMHG | OXYGEN SATURATION: 96 % | RESPIRATION RATE: 16 BRPM | WEIGHT: 293 LBS | SYSTOLIC BLOOD PRESSURE: 122 MMHG | HEIGHT: 69 IN

## 2024-01-24 DIAGNOSIS — G89.18 POST-OP PAIN: ICD-10-CM

## 2024-01-24 DIAGNOSIS — N92.0 MENORRHAGIA WITH REGULAR CYCLE: ICD-10-CM

## 2024-01-24 DIAGNOSIS — N83.8 LEFT OVARIAN ENLARGEMENT: Primary | ICD-10-CM

## 2024-01-24 DIAGNOSIS — D21.9 FIBROID: ICD-10-CM

## 2024-01-24 LAB — PREGNANCY TEST URINE, POC: NEGATIVE

## 2024-01-24 PROCEDURE — 3600000018 HC OR TIME - INITIAL BASE CHARGE - PROCEDURE LEVEL SIX: Performed by: OBSTETRICS & GYNECOLOGY

## 2024-01-24 PROCEDURE — 7100000010 HC PHASE TWO TIME - EACH INCREMENTAL 1 MINUTE: Performed by: OBSTETRICS & GYNECOLOGY

## 2024-01-24 PROCEDURE — 3700000001 HC GENERAL ANESTHESIA TIME - INITIAL BASE CHARGE: Performed by: OBSTETRICS & GYNECOLOGY

## 2024-01-24 PROCEDURE — 7100000009 HC PHASE TWO TIME - INITIAL BASE CHARGE: Performed by: OBSTETRICS & GYNECOLOGY

## 2024-01-24 PROCEDURE — 2720000007 HC OR 272 NO HCPCS: Performed by: OBSTETRICS & GYNECOLOGY

## 2024-01-24 PROCEDURE — 2500000005 HC RX 250 GENERAL PHARMACY W/O HCPCS: Performed by: OBSTETRICS & GYNECOLOGY

## 2024-01-24 PROCEDURE — S2900 ROBOTIC SURGICAL SYSTEM: HCPCS | Performed by: OBSTETRICS & GYNECOLOGY

## 2024-01-24 PROCEDURE — A58570 PR LAPAROSCOPY W TOT HYSTERECT UTERUS 250 GRAM OR LESS: Performed by: ANESTHESIOLOGIST ASSISTANT

## 2024-01-24 PROCEDURE — 7100000001 HC RECOVERY ROOM TIME - INITIAL BASE CHARGE: Performed by: OBSTETRICS & GYNECOLOGY

## 2024-01-24 PROCEDURE — 88342 IMHCHEM/IMCYTCHM 1ST ANTB: CPT | Performed by: PATHOLOGY

## 2024-01-24 PROCEDURE — A4217 STERILE WATER/SALINE, 500 ML: HCPCS | Performed by: OBSTETRICS & GYNECOLOGY

## 2024-01-24 PROCEDURE — 88341 IMHCHEM/IMCYTCHM EA ADD ANTB: CPT | Performed by: PATHOLOGY

## 2024-01-24 PROCEDURE — 3700000002 HC GENERAL ANESTHESIA TIME - EACH INCREMENTAL 1 MINUTE: Performed by: OBSTETRICS & GYNECOLOGY

## 2024-01-24 PROCEDURE — 2500000002 HC RX 250 W HCPCS SELF ADMINISTERED DRUGS (ALT 637 FOR MEDICARE OP, ALT 636 FOR OP/ED): Performed by: ANESTHESIOLOGIST ASSISTANT

## 2024-01-24 PROCEDURE — 2500000005 HC RX 250 GENERAL PHARMACY W/O HCPCS: Performed by: ANESTHESIOLOGIST ASSISTANT

## 2024-01-24 PROCEDURE — 3600000017 HC OR TIME - EACH INCREMENTAL 1 MINUTE - PROCEDURE LEVEL SIX: Performed by: OBSTETRICS & GYNECOLOGY

## 2024-01-24 PROCEDURE — A58570 PR LAPAROSCOPY W TOT HYSTERECT UTERUS 250 GRAM OR LESS: Performed by: ANESTHESIOLOGY

## 2024-01-24 PROCEDURE — 2500000004 HC RX 250 GENERAL PHARMACY W/ HCPCS (ALT 636 FOR OP/ED): Performed by: ANESTHESIOLOGY

## 2024-01-24 PROCEDURE — 2500000004 HC RX 250 GENERAL PHARMACY W/ HCPCS (ALT 636 FOR OP/ED): Performed by: OBSTETRICS & GYNECOLOGY

## 2024-01-24 PROCEDURE — 58573 TLH W/T/O UTERUS OVER 250 G: CPT | Performed by: OBSTETRICS & GYNECOLOGY

## 2024-01-24 PROCEDURE — 7100000002 HC RECOVERY ROOM TIME - EACH INCREMENTAL 1 MINUTE: Performed by: OBSTETRICS & GYNECOLOGY

## 2024-01-24 PROCEDURE — 2500000004 HC RX 250 GENERAL PHARMACY W/ HCPCS (ALT 636 FOR OP/ED): Performed by: ANESTHESIOLOGIST ASSISTANT

## 2024-01-24 PROCEDURE — 2500000001 HC RX 250 WO HCPCS SELF ADMINISTERED DRUGS (ALT 637 FOR MEDICARE OP): Performed by: ANESTHESIOLOGY

## 2024-01-24 PROCEDURE — 88307 TISSUE EXAM BY PATHOLOGIST: CPT | Performed by: PATHOLOGY

## 2024-01-24 PROCEDURE — 88307 TISSUE EXAM BY PATHOLOGIST: CPT | Mod: TC,SUR,AHULAB | Performed by: OBSTETRICS & GYNECOLOGY

## 2024-01-24 RX ORDER — LIDOCAINE HYDROCHLORIDE 20 MG/ML
INJECTION, SOLUTION INFILTRATION; PERINEURAL AS NEEDED
Status: DISCONTINUED | OUTPATIENT
Start: 2024-01-24 | End: 2024-01-24

## 2024-01-24 RX ORDER — SODIUM CHLORIDE, SODIUM LACTATE, POTASSIUM CHLORIDE, CALCIUM CHLORIDE 600; 310; 30; 20 MG/100ML; MG/100ML; MG/100ML; MG/100ML
100 INJECTION, SOLUTION INTRAVENOUS CONTINUOUS
Status: DISCONTINUED | OUTPATIENT
Start: 2024-01-24 | End: 2024-01-24 | Stop reason: HOSPADM

## 2024-01-24 RX ORDER — FLUORESCEIN 500 MG/ML
INJECTION INTRAVENOUS AS NEEDED
Status: DISCONTINUED | OUTPATIENT
Start: 2024-01-24 | End: 2024-01-24

## 2024-01-24 RX ORDER — OXYCODONE HYDROCHLORIDE 5 MG/1
5 TABLET ORAL EVERY 4 HOURS PRN
Status: DISCONTINUED | OUTPATIENT
Start: 2024-01-24 | End: 2024-01-24 | Stop reason: HOSPADM

## 2024-01-24 RX ORDER — FENTANYL CITRATE 50 UG/ML
INJECTION, SOLUTION INTRAMUSCULAR; INTRAVENOUS AS NEEDED
Status: DISCONTINUED | OUTPATIENT
Start: 2024-01-24 | End: 2024-01-24

## 2024-01-24 RX ORDER — ONDANSETRON HYDROCHLORIDE 2 MG/ML
INJECTION, SOLUTION INTRAVENOUS AS NEEDED
Status: DISCONTINUED | OUTPATIENT
Start: 2024-01-24 | End: 2024-01-24

## 2024-01-24 RX ORDER — ALBUTEROL SULFATE 0.83 MG/ML
2.5 SOLUTION RESPIRATORY (INHALATION) ONCE AS NEEDED
Status: DISCONTINUED | OUTPATIENT
Start: 2024-01-24 | End: 2024-01-24 | Stop reason: HOSPADM

## 2024-01-24 RX ORDER — APREPITANT 40 MG/1
CAPSULE ORAL AS NEEDED
Status: DISCONTINUED | OUTPATIENT
Start: 2024-01-24 | End: 2024-01-24

## 2024-01-24 RX ORDER — LIDOCAINE HYDROCHLORIDE 10 MG/ML
0.1 INJECTION, SOLUTION EPIDURAL; INFILTRATION; INTRACAUDAL; PERINEURAL ONCE
Status: DISCONTINUED | OUTPATIENT
Start: 2024-01-24 | End: 2024-01-24 | Stop reason: HOSPADM

## 2024-01-24 RX ORDER — MIDAZOLAM HYDROCHLORIDE 1 MG/ML
INJECTION INTRAMUSCULAR; INTRAVENOUS AS NEEDED
Status: DISCONTINUED | OUTPATIENT
Start: 2024-01-24 | End: 2024-01-24

## 2024-01-24 RX ORDER — BUPIVACAINE HYDROCHLORIDE 5 MG/ML
INJECTION, SOLUTION PERINEURAL AS NEEDED
Status: DISCONTINUED | OUTPATIENT
Start: 2024-01-24 | End: 2024-01-24 | Stop reason: HOSPADM

## 2024-01-24 RX ORDER — SODIUM CHLORIDE 0.9 G/100ML
IRRIGANT IRRIGATION AS NEEDED
Status: DISCONTINUED | OUTPATIENT
Start: 2024-01-24 | End: 2024-01-24 | Stop reason: HOSPADM

## 2024-01-24 RX ORDER — CEFAZOLIN 1 G/1
INJECTION, POWDER, FOR SOLUTION INTRAVENOUS AS NEEDED
Status: DISCONTINUED | OUTPATIENT
Start: 2024-01-24 | End: 2024-01-24

## 2024-01-24 RX ORDER — KETOROLAC TROMETHAMINE 30 MG/ML
INJECTION, SOLUTION INTRAMUSCULAR; INTRAVENOUS AS NEEDED
Status: DISCONTINUED | OUTPATIENT
Start: 2024-01-24 | End: 2024-01-24

## 2024-01-24 RX ORDER — DOCUSATE SODIUM 100 MG/1
200 CAPSULE, LIQUID FILLED ORAL 2 TIMES DAILY
Qty: 30 CAPSULE | Refills: 0 | Status: SHIPPED | OUTPATIENT
Start: 2024-01-24 | End: 2024-02-26 | Stop reason: WASHOUT

## 2024-01-24 RX ORDER — DEXAMETHASONE SODIUM PHOSPHATE 4 MG/ML
INJECTION, SOLUTION INTRA-ARTICULAR; INTRALESIONAL; INTRAMUSCULAR; INTRAVENOUS; SOFT TISSUE AS NEEDED
Status: DISCONTINUED | OUTPATIENT
Start: 2024-01-24 | End: 2024-01-24

## 2024-01-24 RX ORDER — ONDANSETRON HYDROCHLORIDE 2 MG/ML
4 INJECTION, SOLUTION INTRAVENOUS ONCE AS NEEDED
Status: COMPLETED | OUTPATIENT
Start: 2024-01-24 | End: 2024-01-24

## 2024-01-24 RX ORDER — OXYCODONE AND ACETAMINOPHEN 5; 325 MG/1; MG/1
1 TABLET ORAL EVERY 6 HOURS PRN
Qty: 30 TABLET | Refills: 0 | Status: SHIPPED | OUTPATIENT
Start: 2024-01-24 | End: 2024-02-26 | Stop reason: WASHOUT

## 2024-01-24 RX ORDER — LABETALOL HYDROCHLORIDE 5 MG/ML
5 INJECTION, SOLUTION INTRAVENOUS ONCE AS NEEDED
Status: DISCONTINUED | OUTPATIENT
Start: 2024-01-24 | End: 2024-01-24 | Stop reason: HOSPADM

## 2024-01-24 RX ORDER — PROPOFOL 10 MG/ML
INJECTION, EMULSION INTRAVENOUS AS NEEDED
Status: DISCONTINUED | OUTPATIENT
Start: 2024-01-24 | End: 2024-01-24

## 2024-01-24 RX ORDER — HYDRALAZINE HYDROCHLORIDE 20 MG/ML
5 INJECTION INTRAMUSCULAR; INTRAVENOUS EVERY 30 MIN PRN
Status: DISCONTINUED | OUTPATIENT
Start: 2024-01-24 | End: 2024-01-24 | Stop reason: HOSPADM

## 2024-01-24 RX ORDER — ROCURONIUM BROMIDE 10 MG/ML
INJECTION, SOLUTION INTRAVENOUS AS NEEDED
Status: DISCONTINUED | OUTPATIENT
Start: 2024-01-24 | End: 2024-01-24

## 2024-01-24 RX ADMIN — ROCURONIUM BROMIDE 60 MG: 10 INJECTION, SOLUTION INTRAVENOUS at 07:59

## 2024-01-24 RX ADMIN — HYDROMORPHONE HYDROCHLORIDE 0.5 MG: 1 INJECTION, SOLUTION INTRAMUSCULAR; INTRAVENOUS; SUBCUTANEOUS at 12:51

## 2024-01-24 RX ADMIN — OXYCODONE HYDROCHLORIDE 5 MG: 5 TABLET ORAL at 13:06

## 2024-01-24 RX ADMIN — PROPOFOL 200 MG: 10 INJECTION, EMULSION INTRAVENOUS at 07:59

## 2024-01-24 RX ADMIN — APREPITANT 40 MG: 40 CAPSULE ORAL at 07:46

## 2024-01-24 RX ADMIN — ROCURONIUM BROMIDE 10 MG: 10 INJECTION, SOLUTION INTRAVENOUS at 09:56

## 2024-01-24 RX ADMIN — ONDANSETRON 4 MG: 2 INJECTION INTRAMUSCULAR; INTRAVENOUS at 11:21

## 2024-01-24 RX ADMIN — FENTANYL CITRATE 100 MCG: 50 INJECTION, SOLUTION INTRAMUSCULAR; INTRAVENOUS at 07:59

## 2024-01-24 RX ADMIN — MIDAZOLAM HYDROCHLORIDE 2 MG: 1 INJECTION, SOLUTION INTRAMUSCULAR; INTRAVENOUS at 07:49

## 2024-01-24 RX ADMIN — DEXAMETHASONE SODIUM PHOSPHATE 4 MG: 4 INJECTION, SOLUTION INTRAMUSCULAR; INTRAVENOUS at 08:04

## 2024-01-24 RX ADMIN — ONDANSETRON 4 MG: 2 INJECTION INTRAMUSCULAR; INTRAVENOUS at 10:09

## 2024-01-24 RX ADMIN — KETOROLAC TROMETHAMINE 30 MG: 30 INJECTION, SOLUTION INTRAMUSCULAR; INTRAVENOUS at 10:09

## 2024-01-24 RX ADMIN — SUGAMMADEX 200 MG: 100 INJECTION, SOLUTION INTRAVENOUS at 10:20

## 2024-01-24 RX ADMIN — CEFAZOLIN 3 G: 330 INJECTION, POWDER, FOR SOLUTION INTRAMUSCULAR; INTRAVENOUS at 08:04

## 2024-01-24 RX ADMIN — SODIUM CHLORIDE, SODIUM LACTATE, POTASSIUM CHLORIDE, AND CALCIUM CHLORIDE: 600; 310; 30; 20 INJECTION, SOLUTION INTRAVENOUS at 07:46

## 2024-01-24 RX ADMIN — ROCURONIUM BROMIDE 20 MG: 10 INJECTION, SOLUTION INTRAVENOUS at 09:00

## 2024-01-24 RX ADMIN — LIDOCAINE HYDROCHLORIDE 100 MG: 20 INJECTION, SOLUTION INFILTRATION; PERINEURAL at 07:59

## 2024-01-24 RX ADMIN — FLUORESCEIN 25 MG: 500 INJECTION INTRAVENOUS at 09:49

## 2024-01-24 ASSESSMENT — PAIN - FUNCTIONAL ASSESSMENT
PAIN_FUNCTIONAL_ASSESSMENT: 0-10

## 2024-01-24 ASSESSMENT — PAIN SCALES - GENERAL
PAINLEVEL_OUTOF10: 3
PAINLEVEL_OUTOF10: 0 - NO PAIN
PAINLEVEL_OUTOF10: 0 - NO PAIN
PAINLEVEL_OUTOF10: 3
PAINLEVEL_OUTOF10: 3
PAINLEVEL_OUTOF10: 7
PAINLEVEL_OUTOF10: 3

## 2024-01-24 ASSESSMENT — PAIN DESCRIPTION - LOCATION: LOCATION: ABDOMEN

## 2024-01-24 ASSESSMENT — COLUMBIA-SUICIDE SEVERITY RATING SCALE - C-SSRS
2. HAVE YOU ACTUALLY HAD ANY THOUGHTS OF KILLING YOURSELF?: NO
1. IN THE PAST MONTH, HAVE YOU WISHED YOU WERE DEAD OR WISHED YOU COULD GO TO SLEEP AND NOT WAKE UP?: NO
6. HAVE YOU EVER DONE ANYTHING, STARTED TO DO ANYTHING, OR PREPARED TO DO ANYTHING TO END YOUR LIFE?: NO

## 2024-01-24 NOTE — NURSING NOTE
Discharge medication clarified with Dr. Naidu, patient to continue previous prescribed lovenox bridge tonight. Phase II nurse and patient made aware of continuation of Lovenox tonight.

## 2024-01-24 NOTE — ANESTHESIA PROCEDURE NOTES
Peripheral IV  Date/Time: 1/24/2024 8:07 AM  Inserted by: Colette Guevara    Placement  Needle size: 20 G  Laterality: left  Location: forearm  Local anesthetic: none  Site prep: alcohol  Attempts: 1  Difficult Venous Access: Yes

## 2024-01-24 NOTE — OP NOTE
Date: 2024  OR Location: [facility] OR    Name: Katarina Merino, : 1980, Age: 43 y.o., MRN: [id number], Sex: female    Diagnosis  Pre-op Diagnosis     * Fibroid [D21.9]     * Menorrhagia with regular cycle [N92.0] Post-op Diagnosis     * Fibroid [D21.9]     * Menorrhagia with regular cycle [N92.0]     * Left ovarian enlargement [N83.8]     Procedures  Robot Assisted Laparoscopic Total Hysterectomy; Bilateral Salpingectomy, Cystoscopy  80601 - MN LAPAROSCOPY W TOTAL HYSTERECTOMY UTERUS 250 GM/<    Robot Assisted Laparoscopic Total Hysterectomy; Bilateral Salpingectomy, Cystoscopy   - MN ROBOTIC SURGICAL SYSTEM    Oophorectomy Robot-Assisted      Surgeons      * Kenroy Naidu - Primary    Resident/Fellow/Other Assistant:  Surgeon(s) and Role: Fran    Procedure Summary  Anesthesia: General  ASA: III  Anesthesia Staff: Anesthesiologist: Musa Lay MD  C-AA: JUNIOR Santos  Estimated Blood Loss: 10mL  Intra-op Medications: Administrations occurring from 0730 to 0930 on 24:  * No intraprocedure medications in log *           Anesthesia Record               Intraprocedure I/O Totals          Output    Urine 400 mL    Est. Blood Loss 20 mL    Total Output 420 mL          Specimen:   ID Type Source Tests Collected by Time   1 : UTERUS, CERVIX AND BILATERAL FALLOPIAN TUBES AND LEFT OVARY Tissue UTERUS, CERVIX, AND BILATERAL FALLOPIAN TUBES SURGICAL PATHOLOGY EXAM Kenroy Naidu MD 2024 0927        Staff:   Circulator: Rosy Morse RN; Tien Cody RN  Relief Circulator: Jasmin Hammond RN  Relief Scrub: Shahana Pierre  Scrub Person: Alejo Forman         Procedure Details:  The patient was seen in the preoperative area. The site of surgery was properly noted/marked if necessary per policy. The patient has been actively warmed in preoperative area. Preoperative antibiotics have been ordered and given within 1 hours of incision. Venous thrombosis  prophylaxis have been ordered including bilateral sequential compression devices      Procedure Details     After induction of general anesthesia, the patient was placed in modified dorsal lithotomy position where she was prepped, draped, and catheterized in the normal, sterile fashion.  Pelvic exam under anesthesia was performed and found to be benign.      PROCEDURE:  The patient was taken to the operating room, placed under general   anesthesia, and placed in dorsal lithotomy position.  Pelvic exam   under anesthesia was performed and found to be benign.  The patient   was then prepped and draped in normal sterile fashion.  Speculum was   placed in vagina.  The anterior lip of the cervix was grasped with a   single-tooth tenaculum.  Uterus was sounded to 8 cm and a 8cm   Fornisee was placed without any complications.  Tenaculum was then   removed and then Fornisee was then placed without any complications.   Hernandez had been placed in the bladder.  Gloves were then changed.   Attention was then turned to the abdomen.  Towel clips used to tent the umbilicus and a varies was inserted after noting normal entry with saline and normal entry pressures abdomen was insufflated with CO2 gas at that point, all the ports were placed under direct visualization first making skin incision with scalpel and then   placing the ports under direct visualization.  We had a   supraumbilical port, the left and right upper quadrant ports, upper   robotic also as well as a left lower quadrant assistant port, which   was 5 mm.  All ports were placed without any complications.  Under   direct visualization, the robot was then brought in and docked   without any complications.  On examination of the pelvis it was noted that there was a very large left ovary.  It was almost as large as the uterus.  The decision was made at that point to take the left tube with the ovary.  Then using the vessel sealer and ProGrasp, I started  at the IP working  our way down the broad ligament into the round ligament cauterizing and transecting with adequate hemostasis.  Anteriorly, I used Dorita as a guide and I   was able to take down the bladder flap without any complications and   then the uterine arteries were cauterized and transected.  There was   adequate hemostasis.  The same procedure was performed on the   opposite side working on mesosalpinx as we left the right ovary in place then down the broad ligament into round ligament cauterizing and transecting with adequate hemostasis.   The bladder flap was taken down on this side so that the bladder was   completely down and then the uterine artery pedicles were cauterized   and transected.  At that point, the uterus was blanched.  The   ProGrasp was removed.  The hook was brought in and I completed a   colpotomy using the Purviee as a guide with direct visualization with  careful attention paid to the bowel and bladder.  Once the colpotomy   was complete, we removed uterus cervix both tubes and the left ovary.  Bulb suction was placed in the vagina with   a needle at the tip and I brought the needle via the robotic arms and   then closed the vaginal cuff all the way across and long-term back   using the V-Loc suture.  The suture was then cut under direct   visualization and brought out via the robotic arm under direct   visualization.  At that point, the pelvis was irrigated and then   cleared of all clot and debris.  There was adequate hemostasis.   Shorty was brought in via the assistant port and placed at the   vaginal cuff without any complications.  At that point in time,   intravenously Anesthesia had injected fluroscene for   visualization of the ureters. I removed the suction bulb from the vagina.  I disconnected the Hernandez from the   catheter and retrofilled the bladder with 200 mL of saline.  Cystoscopy showed bilateral jet of urine from each ureter under direct visualization.  The   cystoscope was then  removed.  Gloves   were then changed.  Attention was then turned to the abdomen where I   disconnected the robot.  We completed a TAP block with a total of 28 mL   of 0.5% Marcaine in 4 quadrants under direct visualization.  Then removed all ports under direct   visualization with adequate hemostasis.  Abdomen was desufflated of   the CO2 gas and then incisions were closed using 4-0 Vicryl and   Dermabond.  All needle, sponge, and instrument counts were correct.   The patient tolerated the procedure well.              Findings: Enlarged left ovary    Complications:  None; patient tolerated the procedure well.     Disposition: PACU - hemodynamically stable.  Condition: stable

## 2024-01-24 NOTE — ANESTHESIA PROCEDURE NOTES
Airway  Date/Time: 1/24/2024 8:00 AM  Urgency: elective    Airway not difficult    Staffing  Performed: MARIE   Authorized by: Musa Lay MD    Performed by: JUNIOR Santos  Patient location during procedure: OR    Indications and Patient Condition  Indications for airway management: anesthesia and airway protection  Spontaneous Ventilation: absent  Sedation level: deep  Preoxygenated: yes  Patient position: sniffing  MILS not maintained throughout  Mask difficulty assessment: 3 - difficult mask (inadequate, unstable or two providers) +/- NMBA  Planned trial extubation    Final Airway Details  Final airway type: endotracheal airway      Successful airway: ETT  Cuffed: yes   Successful intubation technique: video laryngoscopy  Facilitating devices/methods: intubating stylet  Endotracheal tube insertion site: oral  Blade size: #3  ETT size (mm): 7.0  Cormack-Lehane Classification: grade IIa - partial view of glottis  Placement verified by: chest auscultation, capnometry and palpation of cuff   Measured from: teeth  ETT to teeth (cm): 22  Number of attempts at approach: 1

## 2024-01-24 NOTE — ANESTHESIA PREPROCEDURE EVALUATION
Patient: Katarina Merino    Procedure Information       Anesthesia Start Date/Time: 01/24/24 0746    Procedure: Robot Assisted Laparoscopic Total Hysterectomy; Bilateral Salpingectomy (Bilateral: Pelvis)    Location: [facility] A OR 08 / [facility] A OR    Surgeons: Kenroy Naidu MD          42 yo F hx morbid obesity, HTN, hx DVT (stopped coumadin 10 days ago per Heme doc request and will start Lovenox/coumadin when appropriate postop)    Relevant Problems   Cardiovascular   (+) Hypertension      Neuro/Psych   (+) Basal ganglia hemorrhage (CMS/HCC)      Infectious Disease   (+) HPV in female       Clinical information reviewed:   Tobacco  Allergies  Meds   Med Hx  Surg Hx   Fam Hx  Soc Hx        NPO Detail:  NPO/Void Status  Carbohydrate Drink Given Prior to Surgery? : N  Date of Last Liquid: 01/24/24  Time of Last Liquid: 0500  Date of Last Solid: 01/22/24  Time of Last Solid: 2300  Last Intake Type: Clear fluids  Time of Last Void: 0630         Physical Exam    Airway  Mallampati: III  TM distance: >3 FB  Neck ROM: full     Cardiovascular   Rate: normal     Dental - normal exam     Pulmonary - normal exam     Abdominal            Anesthesia Plan    History of general anesthesia?: yes  History of complications of general anesthesia?: no    ASA 3     general     intravenous induction   Postoperative administration of opioids is intended.  Anesthetic plan and risks discussed with patient.

## 2024-01-24 NOTE — ANESTHESIA POSTPROCEDURE EVALUATION
Patient: Katarina Merino    Procedure Summary       Date: 01/24/24 Room / Location: [facility] A OR 08 / [facility]    Anesthesia Start: 0746 Anesthesia Stop: 1032    Procedures:       Robot Assisted Laparoscopic Total Hysterectomy; Bilateral Salpingectomy, Left OophorectomyCystoscopy (Bilateral: Pelvis)      Oophorectomy Robot-Assisted (Left: Pelvis) Diagnosis:       Fibroid      Menorrhagia with regular cycle      Left ovarian enlargement      (Fibroid [D21.9])      (Menorrhagia with regular cycle [N92.0])    Surgeons: Kenroy Naidu MD Responsible Provider: Musa Lay MD    Anesthesia Type: general ASA Status: 3            Anesthesia Type: general    Vitals Value Taken Time   /68 01/24/24 1345   Temp 36.6 °C (97.9 °F) 01/24/24 1030   Pulse 87 01/24/24 1345   Resp 17 01/24/24 1345   SpO2 96 % 01/24/24 1345       Anesthesia Post Evaluation    Patient participation: complete - patient participated  Level of consciousness: awake  Pain management: adequate  Airway patency: patent  Cardiovascular status: acceptable and hemodynamically stable  Respiratory status: acceptable  Hydration status: acceptable  Postoperative Nausea and Vomiting: none        No notable events documented.

## 2024-01-24 NOTE — PROGRESS NOTES
Called pt.  Doing well post op. Hadn’t yet eaten or urinated. Will have dinner and I’ll call back.     D/w pt her enlarged LO and that we removed.  Was larger than her uterus. Pt understands. Will await path.     Will call back in a few hours to check in again after dinner

## 2024-01-31 ENCOUNTER — APPOINTMENT (OUTPATIENT)
Dept: RADIOLOGY | Facility: HOSPITAL | Age: 44
DRG: 981 | End: 2024-01-31
Payer: COMMERCIAL

## 2024-01-31 ENCOUNTER — TELEPHONE (OUTPATIENT)
Dept: OBSTETRICS AND GYNECOLOGY | Facility: CLINIC | Age: 44
End: 2024-01-31
Payer: COMMERCIAL

## 2024-01-31 ENCOUNTER — HOSPITAL ENCOUNTER (INPATIENT)
Facility: HOSPITAL | Age: 44
LOS: 1 days | Discharge: ANOTHER HOSPITAL | DRG: 981 | End: 2024-02-01
Attending: STUDENT IN AN ORGANIZED HEALTH CARE EDUCATION/TRAINING PROGRAM | Admitting: INTERNAL MEDICINE
Payer: COMMERCIAL

## 2024-01-31 ENCOUNTER — APPOINTMENT (OUTPATIENT)
Dept: CARDIOLOGY | Facility: HOSPITAL | Age: 44
DRG: 981 | End: 2024-01-31
Payer: COMMERCIAL

## 2024-01-31 DIAGNOSIS — R79.89 ELEVATED TROPONIN: ICD-10-CM

## 2024-01-31 DIAGNOSIS — R55 SYNCOPE, NON CARDIAC: ICD-10-CM

## 2024-01-31 DIAGNOSIS — D62 ACUTE BLOOD LOSS ANEMIA: ICD-10-CM

## 2024-01-31 DIAGNOSIS — D64.9 SYMPTOMATIC ANEMIA: Primary | ICD-10-CM

## 2024-01-31 LAB
ABO GROUP (TYPE) IN BLOOD: NORMAL
ABO GROUP (TYPE) IN BLOOD: NORMAL
ALBUMIN SERPL BCP-MCNC: 3.6 G/DL (ref 3.4–5)
ALP SERPL-CCNC: 58 U/L (ref 33–110)
ALT SERPL W P-5'-P-CCNC: 82 U/L (ref 7–45)
ANION GAP SERPL CALC-SCNC: 12 MMOL/L (ref 10–20)
ANTIBODY SCREEN: NORMAL
APTT PPP: 44 SECONDS (ref 27–38)
APTT PPP: 52 SECONDS (ref 27–38)
AST SERPL W P-5'-P-CCNC: 29 U/L (ref 9–39)
BASOPHILS # BLD AUTO: 0.04 X10*3/UL (ref 0–0.1)
BASOPHILS # BLD AUTO: 0.05 X10*3/UL (ref 0–0.1)
BASOPHILS NFR BLD AUTO: 0.2 %
BASOPHILS NFR BLD AUTO: 0.2 %
BILIRUB SERPL-MCNC: 0.3 MG/DL (ref 0–1.2)
BLOOD EXPIRATION DATE: NORMAL
BLOOD EXPIRATION DATE: NORMAL
BNP SERPL-MCNC: 15 PG/ML (ref 0–99)
BUN SERPL-MCNC: 16 MG/DL (ref 6–23)
CALCIUM SERPL-MCNC: 8.7 MG/DL (ref 8.6–10.3)
CARDIAC TROPONIN I PNL SERPL HS: 63 NG/L (ref 0–13)
CARDIAC TROPONIN I PNL SERPL HS: 74 NG/L (ref 0–13)
CHLORIDE SERPL-SCNC: 99 MMOL/L (ref 98–107)
CO2 SERPL-SCNC: 25 MMOL/L (ref 21–32)
CREAT SERPL-MCNC: 1.01 MG/DL (ref 0.5–1.05)
DISPENSE STATUS: NORMAL
DISPENSE STATUS: NORMAL
EGFRCR SERPLBLD CKD-EPI 2021: 72 ML/MIN/1.73M*2
EOSINOPHIL # BLD AUTO: 0.05 X10*3/UL (ref 0–0.7)
EOSINOPHIL # BLD AUTO: 0.09 X10*3/UL (ref 0–0.7)
EOSINOPHIL NFR BLD AUTO: 0.2 %
EOSINOPHIL NFR BLD AUTO: 0.3 %
ERYTHROCYTE [DISTWIDTH] IN BLOOD BY AUTOMATED COUNT: 17.1 % (ref 11.5–14.5)
ERYTHROCYTE [DISTWIDTH] IN BLOOD BY AUTOMATED COUNT: 18.8 % (ref 11.5–14.5)
GLUCOSE SERPL-MCNC: 135 MG/DL (ref 74–99)
HCT VFR BLD AUTO: 19.4 % (ref 36–46)
HCT VFR BLD AUTO: 23.8 % (ref 36–46)
HGB BLD-MCNC: 5.7 G/DL (ref 12–16)
HGB BLD-MCNC: 7.4 G/DL (ref 12–16)
HOLD SPECIMEN: NORMAL
IMM GRANULOCYTES # BLD AUTO: 0.78 X10*3/UL (ref 0–0.7)
IMM GRANULOCYTES # BLD AUTO: 0.86 X10*3/UL (ref 0–0.7)
IMM GRANULOCYTES NFR BLD AUTO: 3 % (ref 0–0.9)
IMM GRANULOCYTES NFR BLD AUTO: 3.3 % (ref 0–0.9)
INR PPP: 1.1 (ref 0.9–1.1)
INR PPP: 2.3 (ref 0.9–1.1)
LACTATE SERPL-SCNC: 1.4 MMOL/L (ref 0.4–2)
LACTATE SERPL-SCNC: 2.3 MMOL/L (ref 0.4–2)
LYMPHOCYTES # BLD AUTO: 2.42 X10*3/UL (ref 1.2–4.8)
LYMPHOCYTES # BLD AUTO: 3.1 X10*3/UL (ref 1.2–4.8)
LYMPHOCYTES NFR BLD AUTO: 10.1 %
LYMPHOCYTES NFR BLD AUTO: 10.8 %
MCH RBC QN AUTO: 21.2 PG (ref 26–34)
MCH RBC QN AUTO: 23.7 PG (ref 26–34)
MCHC RBC AUTO-ENTMCNC: 29.4 G/DL (ref 32–36)
MCHC RBC AUTO-ENTMCNC: 31.1 G/DL (ref 32–36)
MCV RBC AUTO: 72 FL (ref 80–100)
MCV RBC AUTO: 76 FL (ref 80–100)
MONOCYTES # BLD AUTO: 1.78 X10*3/UL (ref 0.1–1)
MONOCYTES # BLD AUTO: 2.09 X10*3/UL (ref 0.1–1)
MONOCYTES NFR BLD AUTO: 7.3 %
MONOCYTES NFR BLD AUTO: 7.4 %
NEUTROPHILS # BLD AUTO: 18.9 X10*3/UL (ref 1.2–7.7)
NEUTROPHILS # BLD AUTO: 22.4 X10*3/UL (ref 1.2–7.7)
NEUTROPHILS NFR BLD AUTO: 78.4 %
NEUTROPHILS NFR BLD AUTO: 78.8 %
NRBC BLD-RTO: 0.2 /100 WBCS (ref 0–0)
NRBC BLD-RTO: 0.3 /100 WBCS (ref 0–0)
PLATELET # BLD AUTO: 309 X10*3/UL (ref 150–450)
PLATELET # BLD AUTO: 349 X10*3/UL (ref 150–450)
POTASSIUM SERPL-SCNC: 4.1 MMOL/L (ref 3.5–5.3)
PRODUCT BLOOD TYPE: 5100
PRODUCT BLOOD TYPE: 5100
PRODUCT CODE: NORMAL
PRODUCT CODE: NORMAL
PROT SERPL-MCNC: 6.1 G/DL (ref 6.4–8.2)
PROTHROMBIN TIME: 12.3 SECONDS (ref 9.8–12.8)
PROTHROMBIN TIME: 26.7 SECONDS (ref 9.8–12.8)
RBC # BLD AUTO: 2.69 X10*6/UL (ref 4–5.2)
RBC # BLD AUTO: 3.12 X10*6/UL (ref 4–5.2)
RH FACTOR (ANTIGEN D): NORMAL
RH FACTOR (ANTIGEN D): NORMAL
SODIUM SERPL-SCNC: 132 MMOL/L (ref 136–145)
UNIT ABO: NORMAL
UNIT ABO: NORMAL
UNIT NUMBER: NORMAL
UNIT NUMBER: NORMAL
UNIT RH: NORMAL
UNIT RH: NORMAL
UNIT VOLUME: 350
UNIT VOLUME: 400
WBC # BLD AUTO: 24 X10*3/UL (ref 4.4–11.3)
WBC # BLD AUTO: 28.6 X10*3/UL (ref 4.4–11.3)
XM INTEP: NORMAL
XM INTEP: NORMAL

## 2024-01-31 PROCEDURE — 86920 COMPATIBILITY TEST SPIN: CPT

## 2024-01-31 PROCEDURE — 96365 THER/PROPH/DIAG IV INF INIT: CPT

## 2024-01-31 PROCEDURE — 84484 ASSAY OF TROPONIN QUANT: CPT | Performed by: EMERGENCY MEDICINE

## 2024-01-31 PROCEDURE — 96374 THER/PROPH/DIAG INJ IV PUSH: CPT | Mod: 59

## 2024-01-31 PROCEDURE — 2550000001 HC RX 255 CONTRASTS: Performed by: STUDENT IN AN ORGANIZED HEALTH CARE EDUCATION/TRAINING PROGRAM

## 2024-01-31 PROCEDURE — 83880 ASSAY OF NATRIURETIC PEPTIDE: CPT | Performed by: EMERGENCY MEDICINE

## 2024-01-31 PROCEDURE — 87040 BLOOD CULTURE FOR BACTERIA: CPT | Mod: STJLAB | Performed by: EMERGENCY MEDICINE

## 2024-01-31 PROCEDURE — 83605 ASSAY OF LACTIC ACID: CPT | Performed by: EMERGENCY MEDICINE

## 2024-01-31 PROCEDURE — 36415 COLL VENOUS BLD VENIPUNCTURE: CPT | Mod: STJLAB | Performed by: EMERGENCY MEDICINE

## 2024-01-31 PROCEDURE — 85025 COMPLETE CBC W/AUTO DIFF WBC: CPT | Performed by: EMERGENCY MEDICINE

## 2024-01-31 PROCEDURE — 74174 CTA ABD&PLVS W/CONTRAST: CPT | Performed by: RADIOLOGY

## 2024-01-31 PROCEDURE — 96366 THER/PROPH/DIAG IV INF ADDON: CPT

## 2024-01-31 PROCEDURE — 2500000004 HC RX 250 GENERAL PHARMACY W/ HCPCS (ALT 636 FOR OP/ED): Performed by: EMERGENCY MEDICINE

## 2024-01-31 PROCEDURE — 93005 ELECTROCARDIOGRAM TRACING: CPT

## 2024-01-31 PROCEDURE — P9016 RBC LEUKOCYTES REDUCED: HCPCS

## 2024-01-31 PROCEDURE — 96368 THER/DIAG CONCURRENT INF: CPT

## 2024-01-31 PROCEDURE — 99285 EMERGENCY DEPT VISIT HI MDM: CPT | Performed by: STUDENT IN AN ORGANIZED HEALTH CARE EDUCATION/TRAINING PROGRAM

## 2024-01-31 PROCEDURE — 85730 THROMBOPLASTIN TIME PARTIAL: CPT | Performed by: EMERGENCY MEDICINE

## 2024-01-31 PROCEDURE — 71275 CT ANGIOGRAPHY CHEST: CPT | Performed by: RADIOLOGY

## 2024-01-31 PROCEDURE — 80053 COMPREHEN METABOLIC PANEL: CPT | Performed by: EMERGENCY MEDICINE

## 2024-01-31 PROCEDURE — 96376 TX/PRO/DX INJ SAME DRUG ADON: CPT

## 2024-01-31 PROCEDURE — 74174 CTA ABD&PLVS W/CONTRAST: CPT

## 2024-01-31 PROCEDURE — 2500000004 HC RX 250 GENERAL PHARMACY W/ HCPCS (ALT 636 FOR OP/ED): Performed by: STUDENT IN AN ORGANIZED HEALTH CARE EDUCATION/TRAINING PROGRAM

## 2024-01-31 PROCEDURE — 96375 TX/PRO/DX INJ NEW DRUG ADDON: CPT

## 2024-01-31 PROCEDURE — 96361 HYDRATE IV INFUSION ADD-ON: CPT

## 2024-01-31 PROCEDURE — 6360000002 HC RX 636 FACTOR: Mod: JZ | Performed by: STUDENT IN AN ORGANIZED HEALTH CARE EDUCATION/TRAINING PROGRAM

## 2024-01-31 PROCEDURE — 36430 TRANSFUSION BLD/BLD COMPNT: CPT

## 2024-01-31 PROCEDURE — 85610 PROTHROMBIN TIME: CPT | Performed by: EMERGENCY MEDICINE

## 2024-01-31 PROCEDURE — A4217 STERILE WATER/SALINE, 500 ML: HCPCS | Performed by: STUDENT IN AN ORGANIZED HEALTH CARE EDUCATION/TRAINING PROGRAM

## 2024-01-31 PROCEDURE — 86901 BLOOD TYPING SEROLOGIC RH(D): CPT | Performed by: EMERGENCY MEDICINE

## 2024-01-31 RX ORDER — ONDANSETRON HYDROCHLORIDE 2 MG/ML
4 INJECTION, SOLUTION INTRAVENOUS ONCE
Status: COMPLETED | OUTPATIENT
Start: 2024-01-31 | End: 2024-01-31

## 2024-01-31 RX ORDER — CIPROFLOXACIN 2 MG/ML
400 INJECTION, SOLUTION INTRAVENOUS ONCE
Status: COMPLETED | OUTPATIENT
Start: 2024-01-31 | End: 2024-02-01

## 2024-01-31 RX ORDER — METRONIDAZOLE 500 MG/100ML
500 INJECTION, SOLUTION INTRAVENOUS ONCE
Status: COMPLETED | OUTPATIENT
Start: 2024-01-31 | End: 2024-02-01

## 2024-01-31 RX ORDER — MORPHINE SULFATE 4 MG/ML
4 INJECTION, SOLUTION INTRAMUSCULAR; INTRAVENOUS ONCE
Status: COMPLETED | OUTPATIENT
Start: 2024-01-31 | End: 2024-01-31

## 2024-01-31 RX ORDER — CALCIUM GLUCONATE 20 MG/ML
2 INJECTION, SOLUTION INTRAVENOUS ONCE
Status: COMPLETED | OUTPATIENT
Start: 2024-01-31 | End: 2024-02-01

## 2024-01-31 RX ADMIN — ONDANSETRON 4 MG: 2 INJECTION INTRAMUSCULAR; INTRAVENOUS at 16:10

## 2024-01-31 RX ADMIN — HYDROMORPHONE HYDROCHLORIDE 0.5 MG: 1 INJECTION, SOLUTION INTRAMUSCULAR; INTRAVENOUS; SUBCUTANEOUS at 22:17

## 2024-01-31 RX ADMIN — IOHEXOL 90 ML: 350 INJECTION, SOLUTION INTRAVENOUS at 20:30

## 2024-01-31 RX ADMIN — SODIUM CHLORIDE 2000 ML: 9 INJECTION, SOLUTION INTRAVENOUS at 16:10

## 2024-01-31 RX ADMIN — MORPHINE SULFATE 4 MG: 4 INJECTION, SOLUTION INTRAMUSCULAR; INTRAVENOUS at 18:44

## 2024-01-31 RX ADMIN — Medication 2000 MG: at 20:25

## 2024-01-31 RX ADMIN — METRONIDAZOLE 500 MG: 500 INJECTION, SOLUTION INTRAVENOUS at 22:59

## 2024-01-31 RX ADMIN — MORPHINE SULFATE 4 MG: 4 INJECTION, SOLUTION INTRAMUSCULAR; INTRAVENOUS at 16:09

## 2024-01-31 RX ADMIN — PHYTONADIONE 10 MG: 10 INJECTION, EMULSION INTRAMUSCULAR; INTRAVENOUS; SUBCUTANEOUS at 22:37

## 2024-01-31 RX ADMIN — CALCIUM GLUCONATE 2 G: 20 INJECTION, SOLUTION INTRAVENOUS at 23:30

## 2024-01-31 RX ADMIN — PROTHROMBIN, COAGULATION FACTOR VII HUMAN, COAGULATION FACTOR IX HUMAN, COAGULATION FACTOR X HUMAN, PROTEIN C, PROTEIN S HUMAN, AND WATER 2500 UNITS: KIT at 22:16

## 2024-01-31 RX ADMIN — ONDANSETRON 4 MG: 2 INJECTION INTRAMUSCULAR; INTRAVENOUS at 22:16

## 2024-01-31 ASSESSMENT — PAIN SCALES - GENERAL
PAINLEVEL_OUTOF10: 9
PAINLEVEL_OUTOF10: 7
PAINLEVEL_OUTOF10: 4
PAINLEVEL_OUTOF10: 7
PAINLEVEL_OUTOF10: 9
PAINLEVEL_OUTOF10: 0 - NO PAIN
PAINLEVEL_OUTOF10: 5 - MODERATE PAIN

## 2024-01-31 ASSESSMENT — PAIN - FUNCTIONAL ASSESSMENT
PAIN_FUNCTIONAL_ASSESSMENT: 0-10
PAIN_FUNCTIONAL_ASSESSMENT: 0-10

## 2024-01-31 ASSESSMENT — COLUMBIA-SUICIDE SEVERITY RATING SCALE - C-SSRS
6. HAVE YOU EVER DONE ANYTHING, STARTED TO DO ANYTHING, OR PREPARED TO DO ANYTHING TO END YOUR LIFE?: NO
2. HAVE YOU ACTUALLY HAD ANY THOUGHTS OF KILLING YOURSELF?: NO
1. IN THE PAST MONTH, HAVE YOU WISHED YOU WERE DEAD OR WISHED YOU COULD GO TO SLEEP AND NOT WAKE UP?: NO

## 2024-01-31 ASSESSMENT — PAIN DESCRIPTION - FREQUENCY: FREQUENCY: CONSTANT/CONTINUOUS

## 2024-01-31 ASSESSMENT — PAIN DESCRIPTION - LOCATION: LOCATION: PELVIS

## 2024-01-31 ASSESSMENT — LIFESTYLE VARIABLES
HAVE PEOPLE ANNOYED YOU BY CRITICIZING YOUR DRINKING: NO
EVER HAD A DRINK FIRST THING IN THE MORNING TO STEADY YOUR NERVES TO GET RID OF A HANGOVER: NO
HAVE YOU EVER FELT YOU SHOULD CUT DOWN ON YOUR DRINKING: NO
EVER FELT BAD OR GUILTY ABOUT YOUR DRINKING: NO

## 2024-01-31 ASSESSMENT — PAIN DESCRIPTION - DESCRIPTORS: DESCRIPTORS: SHARP

## 2024-01-31 ASSESSMENT — PAIN DESCRIPTION - PAIN TYPE: TYPE: SURGICAL PAIN

## 2024-01-31 NOTE — TELEPHONE ENCOUNTER
Katarina Merino  called in and left a voicemail asking for a call back in regards to side effects.    TANISHA MENCHACA MA

## 2024-01-31 NOTE — ED PROVIDER NOTES
EMERGENCY DEPARTMENT ENCOUNTER      Pt Name: Katarina Merino  MRN: [id number]  Birthdate 1980  Date of evaluation: 2024  Provider: Jorge Ferrer DO    CHIEF COMPLAINT       Chief Complaint   Patient presents with    Syncope     HISTORY OF PRESENT ILLNESS    Katarina Merino is a 43 y.o. year old female PMH HTN, HLD, iron deficiency anemia, CVA without residual deficits, DVT on Coumadin who presents to the ER for syncope.  1 week ago she had a laparoscopic abdominal hysterectomy with Dr. Naidu.  She did hold her Coumadin 1 week prior to surgery, the day before surgery her INR was 0.9, she was supposed to have her INR rechecked today. She is postop she has been taking Lovenox twice daily and her Coumadin.  Earlier today she syncopized while on the toilet, she did not fall or hit her head, her  found her unconscious on the toilet.  She does remember going to the bathroom.  She does note persistent abdominal pain, subjective fever, general weakness, loss of appetite, nausea without vomiting.  Denies chest pain, shortness of breath, changes to bowel or bladder habits.      PMH HTN, HLD, iron deficiency Homa, CVA without residual deficits, DVT on Coumadin  PSH laparoscopic hysterectomy, herniorrhaphy,  section  Allergies penicillin-rash     PAST MEDICAL HISTORY     Past Medical History:   Diagnosis Date    DVT of lower extremity (deep venous thrombosis) (CMS/[facility])     RLE - 2018 and     Fibroid     Hyperlipidemia     Hypertension     Iron deficiency anemia due to chronic blood loss     Long term current use of anticoagulant     warfarin -  follows with Dr. So Escamilla    Menorrhagia with regular cycle     Stroke (CMS/[facility]) 2019    ~ 6 weeks post partum, residual right sided weekness    Vision loss     glasses     CURRENT MEDICATIONS       Current Discharge Medication List        CONTINUE these medications which have NOT CHANGED    Details   amLODIPine (Norvasc) 5 mg tablet Take 1 tablet (5 mg)  by mouth once daily.      ascorbic acid (Vitamin C) 250 mg tablet TAKE 1 TABLET BY MOUTH EVERY DAY WITH IRON SUPPLEMENT      atorvastatin (Lipitor) 10 mg tablet Take 1 tablet (10 mg) by mouth once daily.      docusate sodium (Colace) 100 mg capsule Take 2 capsules (200 mg) by mouth 2 times a day.  Qty: 30 capsule, Refills: 0    Associated Diagnoses: Post-op pain      ferrous sulfate 325 (65 Fe) MG tablet TAKE 2 TABLETS BY MOUTH EVERY DAY WITH VITAMIN-CAPSULE TABLET      hydroCHLOROthiazide (HYDRODiuril) 25 mg tablet Take 1 tablet (25 mg) by mouth once daily.      labetalol (Normodyne) 300 mg tablet Take 1 tablet (300 mg) by mouth 2 times a day.      multivitamin tablet Multivitamins TABS  Refills: 0      oxyCODONE-acetaminophen (Percocet) 5-325 mg tablet Take 1 tablet by mouth every 6 hours if needed for severe pain (7 - 10).  Qty: 30 tablet, Refills: 0    Associated Diagnoses: Post-op pain      warfarin (Coumadin) 5 mg tablet STOPPED 24 per [facility] Dr. [facility]           SURGICAL HISTORY       Past Surgical History:   Procedure Laterality Date     SECTION, LOW TRANSVERSE      CT HEAD ANGIO W AND WO IV CONTRAST  10/08/2019    CT HEAD ANGIO W AND WO IV CONTRAST 10/8/2019 [facility]    LAPAROSCOPIC HYSTERECTOMY      RLTH, LSO , B salpingectomy    OOPHORECTOMY Left     w RTH    TONSILLECTOMY      UMBILICAL HERNIA REPAIR      WISDOM TOOTH EXTRACTION       ALLERGIES     Penicillins  FAMILY HISTORY       Family History   Problem Relation Name Age of Onset    Diabetes Mother      Hypertension Mother      Hypertension Father      Hyperlipidemia Father      Hypertension Brother      Hyperlipidemia Brother      Breast cancer Other Aunt      SOCIAL HISTORY       Social History     Tobacco Use    Smoking status: Never    Smokeless tobacco: Never   Vaping Use    Vaping Use: Never used   Substance Use Topics    Alcohol use: Yes     Alcohol/week: 2.0 standard drinks of alcohol     Types: 2 Standard drinks or  equivalent per week    Drug use: Never     PHYSICAL EXAM  (up to 7 for level 4, 8 or more for level 5)     ED Triage Vitals   Temperature Heart Rate Respirations BP   01/31/24 1500 01/31/24 1421 01/31/24 1421 01/31/24 1421   36.6 °C (97.9 °F) (!) 103 (!) 22 99/58      Pulse Ox Temp src Heart Rate Source Patient Position   01/31/24 1421 -- -- --   99 %         BP Location FiO2 (%)     -- --             Physical Exam  Vitals and nursing note reviewed.   Constitutional:       General: She is not in acute distress.     Appearance: Normal appearance. She is not ill-appearing.   HENT:      Head: Normocephalic and atraumatic. No contusion or laceration.      Jaw: No trismus or malocclusion.      Right Ear: External ear normal.      Left Ear: External ear normal.      Mouth/Throat:      Mouth: Mucous membranes are moist.      Pharynx: Oropharynx is clear.   Eyes:      Extraocular Movements: Extraocular movements intact.      Pupils: Pupils are equal, round, and reactive to light.   Neck:      Trachea: No tracheal deviation.   Cardiovascular:      Rate and Rhythm: Regular rhythm. Tachycardia present.      Pulses: Normal pulses.   Pulmonary:      Effort: No respiratory distress.      Breath sounds: No wheezing, rhonchi or rales.   Chest:      Chest wall: No tenderness.   Abdominal:      General: Abdomen is flat. There is no distension.      Palpations: Abdomen is soft. There is no mass.      Tenderness: There is abdominal tenderness (Diffuse). There is no right CVA tenderness or left CVA tenderness.      Comments: For laparoscopic surgical incisions clean dry intact without surrounding erythema purulence or induration   Musculoskeletal:         General: No tenderness or signs of injury.      Cervical back: Normal range of motion. No tenderness.      Right lower leg: No edema.      Left lower leg: No edema.   Skin:     Coloration: Skin is not jaundiced or pale.      Findings: No rash or wound.   Neurological:      General: No  focal deficit present.      Mental Status: She is alert. Mental status is at baseline.   Psychiatric:         Speech: Speech normal.         Behavior: Behavior normal.         Thought Content: Thought content does not include homicidal or suicidal ideation.         Judgment: Judgment normal.        DIAGNOSTIC RESULTS   LABS:  Labs Reviewed   CBC WITH AUTO DIFFERENTIAL - Abnormal       Result Value    WBC 24.0 (*)     nRBC 0.2 (*)     RBC 2.69 (*)     Hemoglobin 5.7 (*)     Hematocrit 19.4 (*)     MCV 72 (*)     MCH 21.2 (*)     MCHC 29.4 (*)     RDW 17.1 (*)     Platelets 349      Neutrophils % 78.8      Immature Granulocytes %, Automated 3.3 (*)     Lymphocytes % 10.1      Monocytes % 7.4      Eosinophils % 0.2      Basophils % 0.2      Neutrophils Absolute 18.90 (*)     Immature Granulocytes Absolute, Automated 0.78 (*)     Lymphocytes Absolute 2.42      Monocytes Absolute 1.78 (*)     Eosinophils Absolute 0.05      Basophils Absolute 0.04     COMPREHENSIVE METABOLIC PANEL - Abnormal    Glucose 135 (*)     Sodium 132 (*)     Potassium 4.1      Chloride 99      Bicarbonate 25      Anion Gap 12      Urea Nitrogen 16      Creatinine 1.01      eGFR 72      Calcium 8.7      Albumin 3.6      Alkaline Phosphatase 58      Total Protein 6.1 (*)     AST 29      Bilirubin, Total 0.3      ALT 82 (*)    COAGULATION SCREEN - Abnormal    Protime 26.7 (*)     INR 2.3 (*)     aPTT 52 (*)     Narrative:     The APTT is no longer used for monitoring Unfractionated Heparin Therapy. For monitoring Heparin Therapy, use the Heparin Assay.   SERIAL TROPONIN-INITIAL - Abnormal    Troponin I, High Sensitivity 74 (*)     Narrative:     Less than 99th percentile of normal range cutoff-  Female and children under 18 years old <14 ng/L; Male <21 ng/L: Negative  Repeat testing should be performed if clinically indicated.     Female and children under 18 years old 14-50 ng/L; Male 21-50 ng/L:  Consistent with possible cardiac damage and possible  increased clinical   risk. Serial measurements may help to assess extent of myocardial damage.     >50 ng/L: Consistent with cardiac damage, increased clinical risk and  myocardial infarction. Serial measurements may help assess extent of   myocardial damage.      NOTE: Children less than 1 year old may have higher baseline troponin   levels and results should be interpreted in conjunction with the overall   clinical context.     NOTE: Troponin I testing is performed using a different   testing methodology at [facility] than at other   [facility]. Direct result comparisons should only   be made within the same method.   LACTATE - Abnormal    Lactate 2.3 (*)     Narrative:     Venipuncture immediately after or during the administration of Metamizole may lead to falsely low results. Testing should be performed immediately  prior to Metamizole dosing.   SERIAL TROPONIN, 1 HOUR - Abnormal    Troponin I, High Sensitivity 63 (*)     Narrative:     Less than 99th percentile of normal range cutoff-  Female and children under 18 years old <14 ng/L; Male <21 ng/L: Negative  Repeat testing should be performed if clinically indicated.     Female and children under 18 years old 14-50 ng/L; Male 21-50 ng/L:  Consistent with possible cardiac damage and possible increased clinical   risk. Serial measurements may help to assess extent of myocardial damage.     >50 ng/L: Consistent with cardiac damage, increased clinical risk and  myocardial infarction. Serial measurements may help assess extent of   myocardial damage.      NOTE: Children less than 1 year old may have higher baseline troponin   levels and results should be interpreted in conjunction with the overall   clinical context.     NOTE: Troponin I testing is performed using a different   testing methodology at [facility] than at other   [facility]. Direct result comparisons should only   be made within the same method.   CBC WITH AUTO  DIFFERENTIAL - Abnormal    WBC 28.6 (*)     nRBC 0.3 (*)     RBC 3.12 (*)     Hemoglobin 7.4 (*)     Hematocrit 23.8 (*)     MCV 76 (*)     MCH 23.7 (*)     MCHC 31.1 (*)     RDW 18.8 (*)     Platelets 309      Neutrophils % 78.4      Immature Granulocytes %, Automated 3.0 (*)     Lymphocytes % 10.8      Monocytes % 7.3      Eosinophils % 0.3      Basophils % 0.2      Neutrophils Absolute 22.40 (*)     Immature Granulocytes Absolute, Automated 0.86 (*)     Lymphocytes Absolute 3.10      Monocytes Absolute 2.09 (*)     Eosinophils Absolute 0.09      Basophils Absolute 0.05     COAGULATION SCREEN - Abnormal    Protime 12.3      INR 1.1      aPTT 44 (*)     Narrative:     The APTT is no longer used for monitoring Unfractionated Heparin Therapy. For monitoring Heparin Therapy, use the Heparin Assay.   BLOOD CULTURE - Normal    Blood Culture Loaded on Instrument - Culture in progress     BLOOD CULTURE - Normal    Blood Culture Loaded on Instrument - Culture in progress     B-TYPE NATRIURETIC PEPTIDE - Normal    BNP 15      Narrative:        <100 pg/mL - Heart failure unlikely  100-299 pg/mL - Intermediate probability of acute heart                  failure exacerbation. Correlate with clinical                  context and patient history.    >=300 pg/mL - Heart Failure likely. Correlate with clinical                  context and patient history.    BNP testing is performed using different testing methodology at [facility] than at other [facility] hospitals. Direct result comparisons should only be made within the same method.      LACTATE - Normal    Lactate 1.4      Narrative:     Venipuncture immediately after or during the administration of Metamizole may lead to falsely low results. Testing should be performed immediately  prior to Metamizole dosing.   TROPONIN SERIES- (INITIAL, 1 HR)    Narrative:     The following orders were created for panel order Troponin I Series, High Sensitivity (0, 1  HR).  Procedure                               Abnormality         Status                     ---------                               -----------         ------                     Troponin I, High Sensiti...[[id number]]  Abnormal            Final result               Troponin, High Sensitivi...[[id number]]  Abnormal            Final result                 Please view results for these tests on the individual orders.   TYPE AND SCREEN    ABO TYPE O      Rh TYPE POS      ANTIBODY SCREEN NEG     VERAB/VERIFY ABORH    ABO TYPE O      Rh TYPE POS     URINALYSIS WITH REFLEX CULTURE AND MICROSCOPIC    Narrative:     The following orders were created for panel order Urinalysis with Reflex Culture and Microscopic.  Procedure                               Abnormality         Status                     ---------                               -----------         ------                     Urinalysis with Reflex C...[[id number]]                                                 Extra Urine Gray Tube[[id number]]                                                         Please view results for these tests on the individual orders.   URINALYSIS WITH REFLEX CULTURE AND MICROSCOPIC   EXTRA URINE GRAY TUBE   PREPARE RBC    PRODUCT CODE X2992B19      Unit Number [id number]      Unit ABO O      Unit RH POS      XM INTEP COMP      Dispense Status TR      Blood Expiration Date February 19, 2024 23:59 EST      PRODUCT BLOOD TYPE 5100      UNIT VOLUME 350      PRODUCT CODE M3337N65      Unit Number [id number]      Unit ABO O      Unit RH POS      XM INTEP COMP      Dispense Status TR      Blood Expiration Date February 22, 2024 23:59 EST      PRODUCT BLOOD TYPE 5100      UNIT VOLUME 400       All other labs were within normal range or not returned as of this dictation.  Imaging  CT angio chest abdomen pelvis   Final Result   No aortic aneurysm or dissection.        Heterogeneous lobulated collection extending from the pelvis to the    right retroperitoneum measuring up to 17 cm (cc). Few serpiginous   regions of enhancement are noted on arterial phase imaging within the   collections concerning for active extravasation. Findings likely   related to recent hysterectomy, given location are centered in the   surgical bed.        Postsurgical changes along predominantly the right lateral abdominal   wall a probable small right intramuscular transverse abdominis   hematoma measuring up to 1 cm.        Moderate ascites, trace pleural effusions, atelectasis and additional   findings as detailed.        MACRO:   Trinity Saldana discussed the significance and urgency of this   critical finding by telephone with  Dr. Topete on 1/31/2024 at 9:35   pm.  (**-RCF-**) Findings:  See findings.        Signed by: Trinity Saldana 1/31/2024 9:38 PM   Dictation workstation:   [id number]      IR intervention arterial embolization    (Results Pending)      Procedure  Procedures  EMERGENCY DEPARTMENT COURSE/MDM:   Medical Decision Making    Vitals:    Vitals:    02/01/24 0220 02/01/24 0225 02/01/24 0230 02/01/24 0235   BP: 133/85 (!) 133/92 138/82 (!) 148/91   BP Location:       Patient Position:       Pulse: 100 97 96 91   Resp: 12 22 15 10   Temp:       TempSrc:       SpO2: 100% 100% 100% 100%   Weight:       Height:         Katarina Merino is a female 43 y.o. who presents to the ER for Syncope. On arrival the patients vital signs were: Afebrile, Tachycardic, Normotensive, Tachypneic, and Oxygenating well on room air. History obtained from: patient and Spouse. Given post op syncope, septic workup initiated. 30cc/kg ibw NS provided as well as broad spectrum antibiotics. On reassessment, tachycardia resolved, BP improved at 1707. Opiates and zofran provided for analgesia and antiemesis.  My independent interpretation of Labs:   CBC: acute pathological leukocytosis, , normocytic anemia, normal platelet count, concerning for acute blood loss anemia, possibly  infection  CMP: Slight hyponatremia, no additional acute electrolyte or hepatorenal abnormality  Troponin/BNP: troponin elevated, repeat without significant delta, likely demand related. No elevated cardiac strain marker.   PT/PTT/INR: Therapeutically anticoagulated.   UA Pending  Lactate: Nonspecific end organ damage marker not elevated  Given acute blood loss anemia, 2 units consented for and transfused, along with calcium gluconate.    CT did show active extravasation, given significant drop in Hb (5.5 points), Kcentra provided for reversal of therapeutic anticoagulation.     On reassessment INR normalized, Hb incremented appropriately.    ED Course as of 02/01/24 0239   Wed Jan 31, 2024   1707 On reassessment tachycardia resolved (HR 86), BP increased (129/83) [CB]   2053 Troponin I Series, High Sensitivity (0, 1 HR)(!!)  Elevated, no significant delta, likely demand [CB]   2250 Discussed with Dr. Naidu, patient's GYN, who recommended continue transfusion, coumadin reversal, no transfer, reach out to inhouse GYN, possibly IR for drainage when she's no longer therapeutically anticoagulated. She plans on staying in touch with pt/family daily.  [CB]   2256 Discussed with Dr. Andre, in house GYN, who agreed to assess the patient  [CB]   2328 Dr. Andre assessed the patient, recommended recommended contacting IR for embolectomy with plan for possible surgical intervention as backup plan, patient will require ICU for close hemodynamic monitoring at least post op, possibly preop if there is a significant time delay due to therapeutic anticoagulation [CB]   2330 CBC and Auto Differential(!)  Repeat Hb incremented appropriately [CB]   Thu Feb 01, 2024   0000 Discussed with IR, Dr. Chatterjee who said he will assemble the team for IR guided initial treatment this evening.  [CB]      ED Course User Index  [CB] Jorge Ferrer,          Diagnoses as of 02/01/24 0239   Symptomatic anemia   Acute blood loss anemia   Syncope,  non cardiac   Elevated troponin       Consultant discussions: As above  Diagnostic testing considered but not performed: Hemolysis labs deferred given active bleeding seen on CT  External Records Reviewed: I reviewed recent and relevant outside records including inpatient notes, outpatient records  Social Determinants Affecting Care: None    Shared decision making for disposition  Patient and/or patient´s representative was counseled regarding labs, imaging, likely diagnosis. All questions were answered. Recommendation was made   for Admission given the need for further escalation of care to inpatient management. Patient agreed and was admitted in stable condition. Admitting team was notified of any pending labs or imaging to ensure continuity of care.     ED Medications administered this visit:    Medications   vancomycin (Vancocin) in sodium chloride 0.9% 500 mL IV 2,000 mg (has no administration in time range)   morphine injection 4 mg (4 mg intravenous Given 1/31/24 1609)   ondansetron (Zofran) injection 4 mg (4 mg intravenous Given 1/31/24 1610)   sodium chloride 0.9 % bolus 1,986 mL (0 mL intravenous Stopped 1/31/24 1810)   morphine injection 4 mg (4 mg intravenous Given 1/31/24 1844)   iohexol (OMNIPaque) 350 mg iodine/mL solution 90 mL (90 mL intravenous Given 1/31/24 2030)   ciprofloxacin (Cipro) IVPB 400 mg (0 mg intravenous Stopped 2/1/24 0135)   metroNIDAZOLE (Flagyl) 500 mg in NaCl (iso-os) 100 mL (0 mg intravenous Stopped 2/1/24 0034)   phytonadione (Vitamin K) 10 mg in dextrose 5 % in water (D5W) 50 mL IV (0 mg intravenous Stopped 2/1/24 0034)   four factor human prothrombin complex concentrate (Kcentra) 2,500 Units in sterile water 100 mL infusion (0 Units intravenous Stopped 1/31/24 2236)   ondansetron (Zofran) injection 4 mg (4 mg intravenous Given 1/31/24 2216)   HYDROmorphone (Dilaudid) injection 0.5 mg (0.5 mg intravenous Given 1/31/24 2217)   calcium gluconate in NS IV 2 g (0 g intravenous  Stopped 2/1/24 0057)   fentaNYL PF (Sublimaze) injection (50 mcg intravenous Given 2/1/24 0220)       New Prescriptions from this visit:    Current Discharge Medication List          Follow-up:  No follow-up provider specified.      Final Impression:   1. Symptomatic anemia    2. Acute blood loss anemia    3. Syncope, non cardiac    4. Elevated troponin          I reviewed the case with the attending ED physician. The attending ED physician agrees with the plan.   Jorge Ferrer DO  PGY-2  Emergency Medicine      Please excuse any misspellings or unintended errors related to the Dragon speech recognition software used to dictate this note.       Jorge Ferrer DO  Resident  02/01/24 0254

## 2024-01-31 NOTE — TELEPHONE ENCOUNTER
Spoke with Katarina's  and he is calling in due to katarina hdz today at 1 pm. He states that she was walking to the bathroom and she gave him a glazed over look and fainted. He reports that she has been in sever pain today and that she took pain med's at about 10am and her pain is still a 9/10. She has been trying to eat and drink but due to not feeling well she hasn't been able to eat much. She also reports being hot and cold but no way to take her temp at home. No reported bleeding.    They are advised to head to the nearest emergency room for evaluation and advised the ER team of her Hysterectomy on 01/24/24 with no reported complications.     agreed but do to her being weak he is going to call 911 for transportation.     Mica Huertas MA

## 2024-02-01 ENCOUNTER — APPOINTMENT (OUTPATIENT)
Dept: RADIOLOGY | Facility: HOSPITAL | Age: 44
DRG: 371 | End: 2024-02-01
Payer: COMMERCIAL

## 2024-02-01 ENCOUNTER — HOSPITAL ENCOUNTER (INPATIENT)
Facility: HOSPITAL | Age: 44
LOS: 6 days | Discharge: HOME | DRG: 371 | End: 2024-02-07
Attending: STUDENT IN AN ORGANIZED HEALTH CARE EDUCATION/TRAINING PROGRAM | Admitting: OBSTETRICS & GYNECOLOGY
Payer: COMMERCIAL

## 2024-02-01 ENCOUNTER — APPOINTMENT (OUTPATIENT)
Dept: RADIOLOGY | Facility: HOSPITAL | Age: 44
DRG: 981 | End: 2024-02-01
Payer: COMMERCIAL

## 2024-02-01 VITALS
TEMPERATURE: 97.7 F | BODY MASS INDEX: 44.41 KG/M2 | HEIGHT: 68 IN | WEIGHT: 293 LBS | DIASTOLIC BLOOD PRESSURE: 83 MMHG | RESPIRATION RATE: 17 BRPM | SYSTOLIC BLOOD PRESSURE: 126 MMHG | HEART RATE: 102 BPM | OXYGEN SATURATION: 100 %

## 2024-02-01 DIAGNOSIS — R57.8 HEMORRHAGIC SHOCK (MULTI): Primary | ICD-10-CM

## 2024-02-01 DIAGNOSIS — Z90.710 STATUS POST HYSTERECTOMY: ICD-10-CM

## 2024-02-01 DIAGNOSIS — N30.00 ACUTE CYSTITIS WITHOUT HEMATURIA: ICD-10-CM

## 2024-02-01 DIAGNOSIS — R58 RETROPERITONEAL BLEED: ICD-10-CM

## 2024-02-01 DIAGNOSIS — Z86.718 HISTORY OF DVT (DEEP VEIN THROMBOSIS): ICD-10-CM

## 2024-02-01 PROBLEM — D64.9 SYMPTOMATIC ANEMIA: Status: ACTIVE | Noted: 2024-02-01

## 2024-02-01 LAB
ABO GROUP (TYPE) IN BLOOD: NORMAL
ALBUMIN SERPL BCP-MCNC: 3.3 G/DL (ref 3.4–5)
ALBUMIN SERPL BCP-MCNC: 3.3 G/DL (ref 3.4–5)
ALP SERPL-CCNC: 52 U/L (ref 33–110)
ALT SERPL W P-5'-P-CCNC: 59 U/L (ref 7–45)
ANION GAP SERPL CALC-SCNC: 13 MMOL/L (ref 10–20)
ANION GAP SERPL CALC-SCNC: 15 MMOL/L (ref 10–20)
ANTIBODY SCREEN: NORMAL
APPEARANCE UR: CLEAR
APTT PPP: 21 SECONDS (ref 27–38)
AST SERPL W P-5'-P-CCNC: 20 U/L (ref 9–39)
BILIRUB SERPL-MCNC: 0.5 MG/DL (ref 0–1.2)
BILIRUB UR STRIP.AUTO-MCNC: NEGATIVE MG/DL
BLOOD EXPIRATION DATE: NORMAL
BLOOD EXPIRATION DATE: NORMAL
BUN SERPL-MCNC: 20 MG/DL (ref 6–23)
BUN SERPL-MCNC: 23 MG/DL (ref 6–23)
CALCIUM SERPL-MCNC: 8.4 MG/DL (ref 8.6–10.3)
CALCIUM SERPL-MCNC: 8.5 MG/DL (ref 8.6–10.6)
CHLORIDE SERPL-SCNC: 102 MMOL/L (ref 98–107)
CHLORIDE SERPL-SCNC: 103 MMOL/L (ref 98–107)
CO2 SERPL-SCNC: 21 MMOL/L (ref 21–32)
CO2 SERPL-SCNC: 22 MMOL/L (ref 21–32)
COLOR UR: YELLOW
CREAT SERPL-MCNC: 0.93 MG/DL (ref 0.5–1.05)
CREAT SERPL-MCNC: 1.05 MG/DL (ref 0.5–1.05)
DISPENSE STATUS: NORMAL
DISPENSE STATUS: NORMAL
EGFRCR SERPLBLD CKD-EPI 2021: 69 ML/MIN/1.73M*2
EGFRCR SERPLBLD CKD-EPI 2021: 79 ML/MIN/1.73M*2
ERYTHROCYTE [DISTWIDTH] IN BLOOD BY AUTOMATED COUNT: 17.5 % (ref 11.5–14.5)
ERYTHROCYTE [DISTWIDTH] IN BLOOD BY AUTOMATED COUNT: 17.6 % (ref 11.5–14.5)
ERYTHROCYTE [DISTWIDTH] IN BLOOD BY AUTOMATED COUNT: 18.1 % (ref 11.5–14.5)
FIBRINOGEN PPP-MCNC: 440 MG/DL (ref 200–400)
GLUCOSE BLD MANUAL STRIP-MCNC: 129 MG/DL (ref 74–99)
GLUCOSE BLD MANUAL STRIP-MCNC: 134 MG/DL (ref 74–99)
GLUCOSE BLD MANUAL STRIP-MCNC: 144 MG/DL (ref 74–99)
GLUCOSE SERPL-MCNC: 119 MG/DL (ref 74–99)
GLUCOSE SERPL-MCNC: 144 MG/DL (ref 74–99)
GLUCOSE UR STRIP.AUTO-MCNC: NEGATIVE MG/DL
HCT VFR BLD AUTO: 20.9 % (ref 36–46)
HCT VFR BLD AUTO: 21 % (ref 36–46)
HCT VFR BLD AUTO: 23.6 % (ref 36–46)
HCT VFR BLD AUTO: 25.8 % (ref 36–46)
HGB BLD-MCNC: 6.6 G/DL (ref 12–16)
HGB BLD-MCNC: 7 G/DL (ref 12–16)
HGB BLD-MCNC: 8.1 G/DL (ref 12–16)
HGB BLD-MCNC: 8.4 G/DL (ref 12–16)
HOLD SPECIMEN: NORMAL
INR PPP: 1.1 (ref 0.9–1.1)
INR PPP: 1.2 (ref 0.9–1.1)
KETONES UR STRIP.AUTO-MCNC: NEGATIVE MG/DL
LEUKOCYTE ESTERASE UR QL STRIP.AUTO: NEGATIVE
MAGNESIUM SERPL-MCNC: 2.01 MG/DL (ref 1.6–2.4)
MAGNESIUM SERPL-MCNC: 2.07 MG/DL (ref 1.6–2.4)
MCH RBC QN AUTO: 24.4 PG (ref 26–34)
MCH RBC QN AUTO: 25.9 PG (ref 26–34)
MCH RBC QN AUTO: 26 PG (ref 26–34)
MCHC RBC AUTO-ENTMCNC: 31.6 G/DL (ref 32–36)
MCHC RBC AUTO-ENTMCNC: 33.3 G/DL (ref 32–36)
MCHC RBC AUTO-ENTMCNC: 34.3 G/DL (ref 32–36)
MCV RBC AUTO: 76 FL (ref 80–100)
MCV RBC AUTO: 77 FL (ref 80–100)
MCV RBC AUTO: 78 FL (ref 80–100)
MRSA DNA SPEC QL NAA+PROBE: NOT DETECTED
NITRITE UR QL STRIP.AUTO: NEGATIVE
NRBC BLD-RTO: 0.5 /100 WBCS (ref 0–0)
NRBC BLD-RTO: 0.8 /100 WBCS (ref 0–0)
NRBC BLD-RTO: 1 /100 WBCS (ref 0–0)
PH UR STRIP.AUTO: 5 [PH]
PHOSPHATE SERPL-MCNC: 3.9 MG/DL (ref 2.5–4.9)
PLATELET # BLD AUTO: 115 X10*3/UL (ref 150–450)
PLATELET # BLD AUTO: 249 X10*3/UL (ref 150–450)
PLATELET # BLD AUTO: 298 X10*3/UL (ref 150–450)
POTASSIUM SERPL-SCNC: 4.6 MMOL/L (ref 3.5–5.3)
POTASSIUM SERPL-SCNC: 5.2 MMOL/L (ref 3.5–5.3)
PRODUCT BLOOD TYPE: 5100
PRODUCT BLOOD TYPE: 5100
PRODUCT CODE: NORMAL
PRODUCT CODE: NORMAL
PROT SERPL-MCNC: 6 G/DL (ref 6.4–8.2)
PROT UR STRIP.AUTO-MCNC: NEGATIVE MG/DL
PROTHROMBIN TIME: 12.2 SECONDS (ref 9.8–12.8)
PROTHROMBIN TIME: 13.2 SECONDS (ref 9.8–12.8)
RBC # BLD AUTO: 2.7 X10*6/UL (ref 4–5.2)
RBC # BLD AUTO: 2.7 X10*6/UL (ref 4–5.2)
RBC # BLD AUTO: 3.12 X10*6/UL (ref 4–5.2)
RBC # UR STRIP.AUTO: ABNORMAL /UL
RBC #/AREA URNS AUTO: NORMAL /HPF
RH FACTOR (ANTIGEN D): NORMAL
SODIUM SERPL-SCNC: 131 MMOL/L (ref 136–145)
SODIUM SERPL-SCNC: 135 MMOL/L (ref 136–145)
SP GR UR STRIP.AUTO: >1.03
SQUAMOUS #/AREA URNS AUTO: NORMAL /HPF
UNIT ABO: NORMAL
UNIT ABO: NORMAL
UNIT NUMBER: NORMAL
UNIT NUMBER: NORMAL
UNIT RH: NORMAL
UNIT RH: NORMAL
UNIT VOLUME: 350
UNIT VOLUME: 350
UROBILINOGEN UR STRIP.AUTO-MCNC: <2 MG/DL
WBC # BLD AUTO: 28.4 X10*3/UL (ref 4.4–11.3)
WBC # BLD AUTO: 29.7 X10*3/UL (ref 4.4–11.3)
WBC # BLD AUTO: 30 X10*3/UL (ref 4.4–11.3)
WBC #/AREA URNS AUTO: NORMAL /HPF
XM INTEP: NORMAL
XM INTEP: NORMAL

## 2024-02-01 PROCEDURE — 85014 HEMATOCRIT: CPT

## 2024-02-01 PROCEDURE — 81001 URINALYSIS AUTO W/SCOPE: CPT | Performed by: EMERGENCY MEDICINE

## 2024-02-01 PROCEDURE — 75726 ARTERY X-RAYS ABDOMEN: CPT | Mod: LT

## 2024-02-01 PROCEDURE — 99291 CRITICAL CARE FIRST HOUR: CPT | Performed by: INTERNAL MEDICINE

## 2024-02-01 PROCEDURE — 85610 PROTHROMBIN TIME: CPT

## 2024-02-01 PROCEDURE — 75630 X-RAY AORTA LEG ARTERIES: CPT

## 2024-02-01 PROCEDURE — 36247 INS CATH ABD/L-EXT ART 3RD: CPT | Performed by: RADIOLOGY

## 2024-02-01 PROCEDURE — 36247 INS CATH ABD/L-EXT ART 3RD: CPT | Mod: RT

## 2024-02-01 PROCEDURE — 2500000004 HC RX 250 GENERAL PHARMACY W/ HCPCS (ALT 636 FOR OP/ED)

## 2024-02-01 PROCEDURE — 2500000004 HC RX 250 GENERAL PHARMACY W/ HCPCS (ALT 636 FOR OP/ED): Performed by: STUDENT IN AN ORGANIZED HEALTH CARE EDUCATION/TRAINING PROGRAM

## 2024-02-01 PROCEDURE — 99291 CRITICAL CARE FIRST HOUR: CPT | Performed by: STUDENT IN AN ORGANIZED HEALTH CARE EDUCATION/TRAINING PROGRAM

## 2024-02-01 PROCEDURE — 36430 TRANSFUSION BLD/BLD COMPNT: CPT

## 2024-02-01 PROCEDURE — 85027 COMPLETE CBC AUTOMATED: CPT

## 2024-02-01 PROCEDURE — 80069 RENAL FUNCTION PANEL: CPT | Performed by: NURSE PRACTITIONER

## 2024-02-01 PROCEDURE — 04LF3DU OCCLUSION OF LEFT UTERINE ARTERY WITH INTRALUMINAL DEVICE, PERCUTANEOUS APPROACH: ICD-10-PCS | Performed by: RADIOLOGY

## 2024-02-01 PROCEDURE — 99222 1ST HOSP IP/OBS MODERATE 55: CPT | Performed by: OBSTETRICS & GYNECOLOGY

## 2024-02-01 PROCEDURE — 37244 VASC EMBOLIZE/OCCLUDE BLEED: CPT | Performed by: RADIOLOGY

## 2024-02-01 PROCEDURE — 80053 COMPREHEN METABOLIC PANEL: CPT

## 2024-02-01 PROCEDURE — 99233 SBSQ HOSP IP/OBS HIGH 50: CPT | Performed by: OBSTETRICS & GYNECOLOGY

## 2024-02-01 PROCEDURE — 71045 X-RAY EXAM CHEST 1 VIEW: CPT

## 2024-02-01 PROCEDURE — 76937 US GUIDE VASCULAR ACCESS: CPT | Mod: RT

## 2024-02-01 PROCEDURE — 87641 MR-STAPH DNA AMP PROBE: CPT

## 2024-02-01 PROCEDURE — 2720000007 HC OR 272 NO HCPCS

## 2024-02-01 PROCEDURE — 76937 US GUIDE VASCULAR ACCESS: CPT | Performed by: RADIOLOGY

## 2024-02-01 PROCEDURE — C1760 CLOSURE DEV, VASC: HCPCS

## 2024-02-01 PROCEDURE — 99291 CRITICAL CARE FIRST HOUR: CPT | Performed by: NURSE PRACTITIONER

## 2024-02-01 PROCEDURE — 99223 1ST HOSP IP/OBS HIGH 75: CPT | Performed by: STUDENT IN AN ORGANIZED HEALTH CARE EDUCATION/TRAINING PROGRAM

## 2024-02-01 PROCEDURE — 37244 VASC EMBOLIZE/OCCLUDE BLEED: CPT

## 2024-02-01 PROCEDURE — 04LE3DT OCCLUSION OF RIGHT UTERINE ARTERY WITH INTRALUMINAL DEVICE, PERCUTANEOUS APPROACH: ICD-10-PCS | Performed by: RADIOLOGY

## 2024-02-01 PROCEDURE — 83735 ASSAY OF MAGNESIUM: CPT

## 2024-02-01 PROCEDURE — 2500000004 HC RX 250 GENERAL PHARMACY W/ HCPCS (ALT 636 FOR OP/ED): Performed by: NURSE PRACTITIONER

## 2024-02-01 PROCEDURE — 82947 ASSAY GLUCOSE BLOOD QUANT: CPT

## 2024-02-01 PROCEDURE — B41C1ZZ FLUOROSCOPY OF PELVIC ARTERIES USING LOW OSMOLAR CONTRAST: ICD-10-PCS | Performed by: RADIOLOGY

## 2024-02-01 PROCEDURE — 2020000001 HC ICU ROOM DAILY

## 2024-02-01 PROCEDURE — 36415 COLL VENOUS BLD VENIPUNCTURE: CPT

## 2024-02-01 PROCEDURE — 83735 ASSAY OF MAGNESIUM: CPT | Performed by: NURSE PRACTITIONER

## 2024-02-01 PROCEDURE — 86901 BLOOD TYPING SEROLOGIC RH(D): CPT | Performed by: NURSE PRACTITIONER

## 2024-02-01 PROCEDURE — 85610 PROTHROMBIN TIME: CPT | Performed by: NURSE PRACTITIONER

## 2024-02-01 PROCEDURE — 85384 FIBRINOGEN ACTIVITY: CPT | Performed by: NURSE PRACTITIONER

## 2024-02-01 PROCEDURE — 2780000003 HC OR 278 NO HCPCS

## 2024-02-01 PROCEDURE — 75710 ARTERY X-RAYS ARM/LEG: CPT | Mod: RT

## 2024-02-01 PROCEDURE — C1887 CATHETER, GUIDING: HCPCS

## 2024-02-01 PROCEDURE — P9016 RBC LEUKOCYTES REDUCED: HCPCS

## 2024-02-01 PROCEDURE — 86920 COMPATIBILITY TEST SPIN: CPT

## 2024-02-01 PROCEDURE — C1769 GUIDE WIRE: HCPCS

## 2024-02-01 PROCEDURE — 51702 INSERT TEMP BLADDER CATH: CPT

## 2024-02-01 PROCEDURE — 2500000004 HC RX 250 GENERAL PHARMACY W/ HCPCS (ALT 636 FOR OP/ED): Performed by: RADIOLOGY

## 2024-02-01 PROCEDURE — 71045 X-RAY EXAM CHEST 1 VIEW: CPT | Performed by: RADIOLOGY

## 2024-02-01 PROCEDURE — 75736 ARTERY X-RAYS PELVIS: CPT | Performed by: RADIOLOGY

## 2024-02-01 PROCEDURE — 96375 TX/PRO/DX INJ NEW DRUG ADDON: CPT | Mod: 59

## 2024-02-01 PROCEDURE — 75774 ARTERY X-RAY EACH VESSEL: CPT | Performed by: RADIOLOGY

## 2024-02-01 PROCEDURE — 85027 COMPLETE CBC AUTOMATED: CPT | Performed by: NURSE PRACTITIONER

## 2024-02-01 RX ORDER — SODIUM CHLORIDE, SODIUM LACTATE, POTASSIUM CHLORIDE, CALCIUM CHLORIDE 600; 310; 30; 20 MG/100ML; MG/100ML; MG/100ML; MG/100ML
75 INJECTION, SOLUTION INTRAVENOUS CONTINUOUS
OUTPATIENT
Start: 2024-02-01 | End: 2024-02-02

## 2024-02-01 RX ORDER — METRONIDAZOLE 500 MG/100ML
500 INJECTION, SOLUTION INTRAVENOUS EVERY 8 HOURS
Status: DISCONTINUED | OUTPATIENT
Start: 2024-02-01 | End: 2024-02-02

## 2024-02-01 RX ORDER — HYDROMORPHONE HYDROCHLORIDE 1 MG/ML
0.4 INJECTION, SOLUTION INTRAMUSCULAR; INTRAVENOUS; SUBCUTANEOUS EVERY 4 HOURS PRN
Status: DISCONTINUED | OUTPATIENT
Start: 2024-02-01 | End: 2024-02-02

## 2024-02-01 RX ORDER — PANTOPRAZOLE SODIUM 40 MG/10ML
40 INJECTION, POWDER, LYOPHILIZED, FOR SOLUTION INTRAVENOUS
Status: DISCONTINUED | OUTPATIENT
Start: 2024-02-02 | End: 2024-02-02

## 2024-02-01 RX ORDER — MORPHINE SULFATE 4 MG/ML
4 INJECTION, SOLUTION INTRAMUSCULAR; INTRAVENOUS EVERY 2 HOUR PRN
Status: DISCONTINUED | OUTPATIENT
Start: 2024-02-01 | End: 2024-02-01 | Stop reason: HOSPADM

## 2024-02-01 RX ORDER — PANTOPRAZOLE SODIUM 40 MG/10ML
40 INJECTION, POWDER, LYOPHILIZED, FOR SOLUTION INTRAVENOUS DAILY
Status: DISCONTINUED | OUTPATIENT
Start: 2024-02-01 | End: 2024-02-01

## 2024-02-01 RX ORDER — METRONIDAZOLE 500 MG/100ML
500 INJECTION, SOLUTION INTRAVENOUS EVERY 8 HOURS
Status: CANCELLED | OUTPATIENT
Start: 2024-02-01

## 2024-02-01 RX ORDER — MAGNESIUM SULFATE HEPTAHYDRATE 40 MG/ML
4 INJECTION, SOLUTION INTRAVENOUS EVERY 6 HOURS PRN
Status: DISCONTINUED | OUTPATIENT
Start: 2024-02-01 | End: 2024-02-04

## 2024-02-01 RX ORDER — DEXTROSE MONOHYDRATE 100 MG/ML
0.3 INJECTION, SOLUTION INTRAVENOUS ONCE AS NEEDED
OUTPATIENT
Start: 2024-02-01

## 2024-02-01 RX ORDER — FERROUS SULFATE 300 MG/5ML
60 LIQUID (ML) ORAL DAILY
COMMUNITY

## 2024-02-01 RX ORDER — CIPROFLOXACIN 2 MG/ML
400 INJECTION, SOLUTION INTRAVENOUS EVERY 12 HOURS
Status: DISCONTINUED | OUTPATIENT
Start: 2024-02-01 | End: 2024-02-02

## 2024-02-01 RX ORDER — CIPROFLOXACIN 2 MG/ML
400 INJECTION, SOLUTION INTRAVENOUS EVERY 12 HOURS SCHEDULED
Status: DISCONTINUED | OUTPATIENT
Start: 2024-02-01 | End: 2024-02-01 | Stop reason: HOSPADM

## 2024-02-01 RX ORDER — HYDROMORPHONE HYDROCHLORIDE 1 MG/ML
0.2 INJECTION, SOLUTION INTRAMUSCULAR; INTRAVENOUS; SUBCUTANEOUS ONCE
Status: COMPLETED | OUTPATIENT
Start: 2024-02-01 | End: 2024-02-01

## 2024-02-01 RX ORDER — INSULIN LISPRO 100 [IU]/ML
0-5 INJECTION, SOLUTION INTRAVENOUS; SUBCUTANEOUS EVERY 4 HOURS
Status: DISCONTINUED | OUTPATIENT
Start: 2024-02-01 | End: 2024-02-04

## 2024-02-01 RX ORDER — DEXTROSE MONOHYDRATE 100 MG/ML
0.3 INJECTION, SOLUTION INTRAVENOUS ONCE AS NEEDED
Status: DISCONTINUED | OUTPATIENT
Start: 2024-02-01 | End: 2024-02-01 | Stop reason: HOSPADM

## 2024-02-01 RX ORDER — DEXTROSE 50 % IN WATER (D50W) INTRAVENOUS SYRINGE
25
Status: DISCONTINUED | OUTPATIENT
Start: 2024-02-01 | End: 2024-02-01 | Stop reason: HOSPADM

## 2024-02-01 RX ORDER — ENOXAPARIN SODIUM 150 MG/ML
150 INJECTION SUBCUTANEOUS EVERY 12 HOURS
COMMUNITY
Start: 2024-01-10 | End: 2024-02-07 | Stop reason: HOSPADM

## 2024-02-01 RX ORDER — ONDANSETRON HYDROCHLORIDE 2 MG/ML
4 INJECTION, SOLUTION INTRAVENOUS EVERY 4 HOURS PRN
OUTPATIENT
Start: 2024-02-01

## 2024-02-01 RX ORDER — CALCIUM GLUCONATE 20 MG/ML
2 INJECTION, SOLUTION INTRAVENOUS EVERY 6 HOURS PRN
Status: DISCONTINUED | OUTPATIENT
Start: 2024-02-01 | End: 2024-02-04

## 2024-02-01 RX ORDER — ONDANSETRON HYDROCHLORIDE 2 MG/ML
4 INJECTION, SOLUTION INTRAVENOUS EVERY 4 HOURS PRN
Status: DISCONTINUED | OUTPATIENT
Start: 2024-02-01 | End: 2024-02-01 | Stop reason: HOSPADM

## 2024-02-01 RX ORDER — POTASSIUM CHLORIDE 14.9 MG/ML
20 INJECTION INTRAVENOUS EVERY 6 HOURS PRN
Status: DISCONTINUED | OUTPATIENT
Start: 2024-02-01 | End: 2024-02-04

## 2024-02-01 RX ORDER — METRONIDAZOLE 500 MG/100ML
500 INJECTION, SOLUTION INTRAVENOUS EVERY 8 HOURS
Status: DISCONTINUED | OUTPATIENT
Start: 2024-02-01 | End: 2024-02-01 | Stop reason: HOSPADM

## 2024-02-01 RX ORDER — MORPHINE SULFATE 4 MG/ML
4 INJECTION, SOLUTION INTRAMUSCULAR; INTRAVENOUS EVERY 2 HOUR PRN
OUTPATIENT
Start: 2024-02-01

## 2024-02-01 RX ORDER — SODIUM CHLORIDE, SODIUM LACTATE, POTASSIUM CHLORIDE, CALCIUM CHLORIDE 600; 310; 30; 20 MG/100ML; MG/100ML; MG/100ML; MG/100ML
50 INJECTION, SOLUTION INTRAVENOUS CONTINUOUS
Status: DISCONTINUED | OUTPATIENT
Start: 2024-02-01 | End: 2024-02-03

## 2024-02-01 RX ORDER — DEXTROSE MONOHYDRATE 100 MG/ML
0.3 INJECTION, SOLUTION INTRAVENOUS ONCE AS NEEDED
Status: DISCONTINUED | OUTPATIENT
Start: 2024-02-01 | End: 2024-02-04

## 2024-02-01 RX ORDER — MORPHINE SULFATE 2 MG/ML
2 INJECTION, SOLUTION INTRAMUSCULAR; INTRAVENOUS EVERY 2 HOUR PRN
Status: DISCONTINUED | OUTPATIENT
Start: 2024-02-01 | End: 2024-02-01 | Stop reason: HOSPADM

## 2024-02-01 RX ORDER — AMLODIPINE BESYLATE 10 MG/1
10 TABLET ORAL DAILY
COMMUNITY

## 2024-02-01 RX ORDER — DEXTROSE 50 % IN WATER (D50W) INTRAVENOUS SYRINGE
25
Status: DISCONTINUED | OUTPATIENT
Start: 2024-02-01 | End: 2024-02-04

## 2024-02-01 RX ORDER — POTASSIUM CHLORIDE 14.9 MG/ML
20 INJECTION INTRAVENOUS EVERY 6 HOURS PRN
Status: DISCONTINUED | OUTPATIENT
Start: 2024-02-01 | End: 2024-02-01 | Stop reason: HOSPADM

## 2024-02-01 RX ORDER — METRONIDAZOLE 500 MG/100ML
500 INJECTION, SOLUTION INTRAVENOUS EVERY 8 HOURS
OUTPATIENT
Start: 2024-02-01

## 2024-02-01 RX ORDER — SODIUM CHLORIDE, SODIUM LACTATE, POTASSIUM CHLORIDE, CALCIUM CHLORIDE 600; 310; 30; 20 MG/100ML; MG/100ML; MG/100ML; MG/100ML
75 INJECTION, SOLUTION INTRAVENOUS CONTINUOUS
Status: DISCONTINUED | OUTPATIENT
Start: 2024-02-01 | End: 2024-02-01 | Stop reason: HOSPADM

## 2024-02-01 RX ORDER — CALCIUM GLUCONATE 20 MG/ML
1 INJECTION, SOLUTION INTRAVENOUS EVERY 6 HOURS PRN
Status: DISCONTINUED | OUTPATIENT
Start: 2024-02-01 | End: 2024-02-01 | Stop reason: HOSPADM

## 2024-02-01 RX ORDER — CALCIUM GLUCONATE 20 MG/ML
1 INJECTION, SOLUTION INTRAVENOUS EVERY 6 HOURS PRN
Status: DISCONTINUED | OUTPATIENT
Start: 2024-02-01 | End: 2024-02-04

## 2024-02-01 RX ORDER — CALCIUM GLUCONATE 20 MG/ML
2 INJECTION, SOLUTION INTRAVENOUS EVERY 6 HOURS PRN
Status: DISCONTINUED | OUTPATIENT
Start: 2024-02-01 | End: 2024-02-01 | Stop reason: HOSPADM

## 2024-02-01 RX ORDER — DEXTROSE 50 % IN WATER (D50W) INTRAVENOUS SYRINGE
25
OUTPATIENT
Start: 2024-02-01

## 2024-02-01 RX ORDER — FENTANYL CITRATE 50 UG/ML
INJECTION, SOLUTION INTRAMUSCULAR; INTRAVENOUS
Status: COMPLETED | OUTPATIENT
Start: 2024-02-01 | End: 2024-02-01

## 2024-02-01 RX ORDER — HYDROMORPHONE HYDROCHLORIDE 1 MG/ML
0.2 INJECTION, SOLUTION INTRAMUSCULAR; INTRAVENOUS; SUBCUTANEOUS EVERY 4 HOURS PRN
Status: DISCONTINUED | OUTPATIENT
Start: 2024-02-01 | End: 2024-02-02

## 2024-02-01 RX ORDER — MAGNESIUM SULFATE HEPTAHYDRATE 40 MG/ML
2 INJECTION, SOLUTION INTRAVENOUS EVERY 6 HOURS PRN
Status: DISCONTINUED | OUTPATIENT
Start: 2024-02-01 | End: 2024-02-01 | Stop reason: HOSPADM

## 2024-02-01 RX ORDER — MORPHINE SULFATE 2 MG/ML
2 INJECTION, SOLUTION INTRAMUSCULAR; INTRAVENOUS EVERY 2 HOUR PRN
OUTPATIENT
Start: 2024-02-01

## 2024-02-01 RX ORDER — MAGNESIUM SULFATE HEPTAHYDRATE 40 MG/ML
4 INJECTION, SOLUTION INTRAVENOUS EVERY 6 HOURS PRN
Status: DISCONTINUED | OUTPATIENT
Start: 2024-02-01 | End: 2024-02-01 | Stop reason: HOSPADM

## 2024-02-01 RX ORDER — CIPROFLOXACIN 2 MG/ML
400 INJECTION, SOLUTION INTRAVENOUS EVERY 12 HOURS SCHEDULED
OUTPATIENT
Start: 2024-02-01

## 2024-02-01 RX ORDER — MAGNESIUM SULFATE HEPTAHYDRATE 40 MG/ML
2 INJECTION, SOLUTION INTRAVENOUS EVERY 6 HOURS PRN
Status: DISCONTINUED | OUTPATIENT
Start: 2024-02-01 | End: 2024-02-04

## 2024-02-01 RX ADMIN — HYDROMORPHONE HYDROCHLORIDE 0.2 MG: 1 INJECTION, SOLUTION INTRAMUSCULAR; INTRAVENOUS; SUBCUTANEOUS at 21:37

## 2024-02-01 RX ADMIN — MORPHINE SULFATE 4 MG: 4 INJECTION, SOLUTION INTRAMUSCULAR; INTRAVENOUS at 13:06

## 2024-02-01 RX ADMIN — CIPROFLOXACIN 400 MG: 400 INJECTION, SOLUTION INTRAVENOUS at 00:35

## 2024-02-01 RX ADMIN — MORPHINE SULFATE 4 MG: 4 INJECTION, SOLUTION INTRAMUSCULAR; INTRAVENOUS at 08:52

## 2024-02-01 RX ADMIN — SODIUM CHLORIDE, POTASSIUM CHLORIDE, SODIUM LACTATE AND CALCIUM CHLORIDE 500 ML: 600; 310; 30; 20 INJECTION, SOLUTION INTRAVENOUS at 10:39

## 2024-02-01 RX ADMIN — ONDANSETRON 4 MG: 2 INJECTION INTRAMUSCULAR; INTRAVENOUS at 08:52

## 2024-02-01 RX ADMIN — ONDANSETRON 4 MG: 2 INJECTION INTRAMUSCULAR; INTRAVENOUS at 13:06

## 2024-02-01 RX ADMIN — FENTANYL CITRATE 50 MCG: 50 INJECTION, SOLUTION INTRAMUSCULAR; INTRAVENOUS at 02:20

## 2024-02-01 RX ADMIN — HYDROMORPHONE HYDROCHLORIDE 0.4 MG: 1 INJECTION, SOLUTION INTRAMUSCULAR; INTRAVENOUS; SUBCUTANEOUS at 18:45

## 2024-02-01 RX ADMIN — SODIUM CHLORIDE, POTASSIUM CHLORIDE, SODIUM LACTATE AND CALCIUM CHLORIDE 100 ML/HR: 600; 310; 30; 20 INJECTION, SOLUTION INTRAVENOUS at 16:04

## 2024-02-01 RX ADMIN — VANCOMYCIN HYDROCHLORIDE 1250 MG: 1.25 INJECTION, POWDER, LYOPHILIZED, FOR SOLUTION INTRAVENOUS at 08:18

## 2024-02-01 RX ADMIN — METRONIDAZOLE 500 MG: 500 INJECTION, SOLUTION INTRAVENOUS at 08:41

## 2024-02-01 RX ADMIN — METRONIDAZOLE 500 MG: 500 INJECTION, SOLUTION INTRAVENOUS at 18:45

## 2024-02-01 RX ADMIN — HYDROMORPHONE HYDROCHLORIDE 0.4 MG: 1 INJECTION, SOLUTION INTRAMUSCULAR; INTRAVENOUS; SUBCUTANEOUS at 14:19

## 2024-02-01 RX ADMIN — MORPHINE SULFATE 4 MG: 4 INJECTION, SOLUTION INTRAMUSCULAR; INTRAVENOUS at 10:56

## 2024-02-01 RX ADMIN — SODIUM CHLORIDE, POTASSIUM CHLORIDE, SODIUM LACTATE AND CALCIUM CHLORIDE 500 ML: 600; 310; 30; 20 INJECTION, SOLUTION INTRAVENOUS at 22:08

## 2024-02-01 RX ADMIN — CIPROFLOXACIN 400 MG: 400 INJECTION, SOLUTION INTRAVENOUS at 20:06

## 2024-02-01 RX ADMIN — CIPROFLOXACIN 400 MG: 400 INJECTION, SOLUTION INTRAVENOUS at 08:18

## 2024-02-01 RX ADMIN — SODIUM CHLORIDE, POTASSIUM CHLORIDE, SODIUM LACTATE AND CALCIUM CHLORIDE 75 ML/HR: 600; 310; 30; 20 INJECTION, SOLUTION INTRAVENOUS at 08:18

## 2024-02-01 SDOH — SOCIAL STABILITY: SOCIAL INSECURITY: DO YOU FEEL ANYONE HAS EXPLOITED OR TAKEN ADVANTAGE OF YOU FINANCIALLY OR OF YOUR PERSONAL PROPERTY?: NO

## 2024-02-01 SDOH — SOCIAL STABILITY: SOCIAL INSECURITY: HAVE YOU HAD THOUGHTS OF HARMING ANYONE ELSE?: NO

## 2024-02-01 SDOH — SOCIAL STABILITY: SOCIAL INSECURITY: HAS ANYONE EVER THREATENED TO HURT YOUR FAMILY OR YOUR PETS?: NO

## 2024-02-01 SDOH — ECONOMIC STABILITY: INCOME INSECURITY: HOW HARD IS IT FOR YOU TO PAY FOR THE VERY BASICS LIKE FOOD, HOUSING, MEDICAL CARE, AND HEATING?: NOT HARD AT ALL

## 2024-02-01 SDOH — SOCIAL STABILITY: SOCIAL INSECURITY: ARE YOU OR HAVE YOU BEEN THREATENED OR ABUSED PHYSICALLY, EMOTIONALLY, OR SEXUALLY BY ANYONE?: NO

## 2024-02-01 SDOH — SOCIAL STABILITY: SOCIAL INSECURITY: DO YOU FEEL UNSAFE GOING BACK TO THE PLACE WHERE YOU ARE LIVING?: NO

## 2024-02-01 SDOH — SOCIAL STABILITY: SOCIAL INSECURITY: ARE THERE ANY APPARENT SIGNS OF INJURIES/BEHAVIORS THAT COULD BE RELATED TO ABUSE/NEGLECT?: NO

## 2024-02-01 SDOH — SOCIAL STABILITY: SOCIAL INSECURITY: WERE YOU ABLE TO COMPLETE ALL THE BEHAVIORAL HEALTH SCREENINGS?: YES

## 2024-02-01 SDOH — SOCIAL STABILITY: SOCIAL INSECURITY: DOES ANYONE TRY TO KEEP YOU FROM HAVING/CONTACTING OTHER FRIENDS OR DOING THINGS OUTSIDE YOUR HOME?: NO

## 2024-02-01 SDOH — SOCIAL STABILITY: SOCIAL INSECURITY: ABUSE: ADULT

## 2024-02-01 ASSESSMENT — COGNITIVE AND FUNCTIONAL STATUS - GENERAL
HELP NEEDED FOR BATHING: A LITTLE
PERSONAL GROOMING: A LITTLE
HELP NEEDED FOR BATHING: A LITTLE
WALKING IN HOSPITAL ROOM: A LITTLE
DRESSING REGULAR UPPER BODY CLOTHING: A LITTLE
PATIENT BASELINE BEDBOUND: NO
MOVING FROM LYING ON BACK TO SITTING ON SIDE OF FLAT BED WITH BEDRAILS: A LITTLE
MOVING FROM LYING ON BACK TO SITTING ON SIDE OF FLAT BED WITH BEDRAILS: A LITTLE
MOVING TO AND FROM BED TO CHAIR: A LITTLE
WALKING IN HOSPITAL ROOM: A LITTLE
DAILY ACTIVITIY SCORE: 18
PERSONAL GROOMING: A LITTLE
TURNING FROM BACK TO SIDE WHILE IN FLAT BAD: A LITTLE
MOBILITY SCORE: 17
STANDING UP FROM CHAIR USING ARMS: A LITTLE
TOILETING: A LITTLE
TURNING FROM BACK TO SIDE WHILE IN FLAT BAD: A LITTLE
MOVING TO AND FROM BED TO CHAIR: A LITTLE
CLIMB 3 TO 5 STEPS WITH RAILING: A LOT
TURNING FROM BACK TO SIDE WHILE IN FLAT BAD: A LITTLE
TURNING FROM BACK TO SIDE WHILE IN FLAT BAD: A LITTLE
DRESSING REGULAR UPPER BODY CLOTHING: A LITTLE
STANDING UP FROM CHAIR USING ARMS: A LITTLE
WALKING IN HOSPITAL ROOM: A LITTLE
MOVING FROM LYING ON BACK TO SITTING ON SIDE OF FLAT BED WITH BEDRAILS: A LITTLE
CLIMB 3 TO 5 STEPS WITH RAILING: A LITTLE
DRESSING REGULAR LOWER BODY CLOTHING: A LITTLE
DRESSING REGULAR LOWER BODY CLOTHING: A LITTLE
TOILETING: A LITTLE
HELP NEEDED FOR BATHING: A LITTLE
HELP NEEDED FOR BATHING: A LITTLE
DAILY ACTIVITIY SCORE: 19
TOILETING: A LITTLE
PERSONAL GROOMING: A LITTLE
MOVING FROM LYING ON BACK TO SITTING ON SIDE OF FLAT BED WITH BEDRAILS: A LITTLE
MOVING TO AND FROM BED TO CHAIR: A LITTLE
DRESSING REGULAR UPPER BODY CLOTHING: A LITTLE
WALKING IN HOSPITAL ROOM: A LITTLE
DRESSING REGULAR LOWER BODY CLOTHING: A LITTLE
CLIMB 3 TO 5 STEPS WITH RAILING: A LOT
STANDING UP FROM CHAIR USING ARMS: A LITTLE
MOBILITY SCORE: 17
DRESSING REGULAR LOWER BODY CLOTHING: A LITTLE
DRESSING REGULAR UPPER BODY CLOTHING: A LITTLE
EATING MEALS: A LITTLE
STANDING UP FROM CHAIR USING ARMS: A LITTLE
DAILY ACTIVITIY SCORE: 20
DAILY ACTIVITIY SCORE: 18
MOBILITY SCORE: 17
EATING MEALS: A LITTLE
CLIMB 3 TO 5 STEPS WITH RAILING: A LOT
PATIENT BASELINE BEDBOUND: NO
MOVING TO AND FROM BED TO CHAIR: A LITTLE
TOILETING: A LITTLE
MOBILITY SCORE: 18

## 2024-02-01 ASSESSMENT — ENCOUNTER SYMPTOMS
ALLERGIC/IMMUNOLOGIC NEGATIVE: 1
RESPIRATORY NEGATIVE: 1
ABDOMINAL PAIN: 1
MUSCULOSKELETAL NEGATIVE: 1
PSYCHIATRIC NEGATIVE: 1
ENDOCRINE NEGATIVE: 1
NEUROLOGICAL NEGATIVE: 1
CARDIOVASCULAR NEGATIVE: 1
EYES NEGATIVE: 1
FATIGUE: 1

## 2024-02-01 ASSESSMENT — ACTIVITIES OF DAILY LIVING (ADL)
BATHING: NEEDS ASSISTANCE
ADEQUATE_TO_COMPLETE_ADL: YES
TOILETING: NEEDS ASSISTANCE
LACK_OF_TRANSPORTATION: NO
LACK_OF_TRANSPORTATION: NO
WALKS IN HOME: INDEPENDENT
PATIENT'S MEMORY ADEQUATE TO SAFELY COMPLETE DAILY ACTIVITIES?: YES
GROOMING: NEEDS ASSISTANCE
FEEDING YOURSELF: NEEDS ASSISTANCE
HEARING - LEFT EAR: FUNCTIONAL
BATHING: NEEDS ASSISTANCE
DRESSING YOURSELF: NEEDS ASSISTANCE
WALKS IN HOME: INDEPENDENT
DRESSING YOURSELF: NEEDS ASSISTANCE
JUDGMENT_ADEQUATE_SAFELY_COMPLETE_DAILY_ACTIVITIES: YES
ADEQUATE_TO_COMPLETE_ADL: YES
PATIENT'S MEMORY ADEQUATE TO SAFELY COMPLETE DAILY ACTIVITIES?: YES
FEEDING YOURSELF: INDEPENDENT
JUDGMENT_ADEQUATE_SAFELY_COMPLETE_DAILY_ACTIVITIES: YES
HEARING - LEFT EAR: FUNCTIONAL
HEARING - RIGHT EAR: FUNCTIONAL
TOILETING: NEEDS ASSISTANCE
HEARING - RIGHT EAR: FUNCTIONAL
GROOMING: INDEPENDENT

## 2024-02-01 ASSESSMENT — LIFESTYLE VARIABLES
HOW OFTEN DO YOU HAVE A DRINK CONTAINING ALCOHOL: MONTHLY OR LESS
SKIP TO QUESTIONS 9-10: 0
HOW OFTEN DO YOU HAVE A DRINK CONTAINING ALCOHOL: MONTHLY OR LESS
HOW OFTEN DO YOU HAVE 6 OR MORE DRINKS ON ONE OCCASION: LESS THAN MONTHLY
AUDIT-C TOTAL SCORE: 3
HOW MANY STANDARD DRINKS CONTAINING ALCOHOL DO YOU HAVE ON A TYPICAL DAY: 3 OR 4
AUDIT-C TOTAL SCORE: 1
SUBSTANCE_ABUSE_PAST_12_MONTHS: NO
AUDIT-C TOTAL SCORE: 1
HOW OFTEN DO YOU HAVE 6 OR MORE DRINKS ON ONE OCCASION: NEVER
PRESCIPTION_ABUSE_PAST_12_MONTHS: NO
AUDIT-C TOTAL SCORE: 3
SKIP TO QUESTIONS 9-10: 1
HOW MANY STANDARD DRINKS CONTAINING ALCOHOL DO YOU HAVE ON A TYPICAL DAY: 1 OR 2

## 2024-02-01 ASSESSMENT — PAIN SCALES - GENERAL
PAINLEVEL_OUTOF10: 9
PAINLEVEL_OUTOF10: 0 - NO PAIN
PAINLEVEL_OUTOF10: 0 - NO PAIN
PAINLEVEL_OUTOF10: 6
PAINLEVEL_OUTOF10: 7
PAINLEVEL_OUTOF10: 0 - NO PAIN
PAINLEVEL_OUTOF10: 7
PAINLEVEL_OUTOF10: 0 - NO PAIN
PAINLEVEL_OUTOF10: 0 - NO PAIN
PAINLEVEL_OUTOF10: 7
PAINLEVEL_OUTOF10: 0 - NO PAIN
PAINLEVEL_OUTOF10: 7
PAINLEVEL_OUTOF10: 2
PAINLEVEL_OUTOF10: 0 - NO PAIN
PAINLEVEL_OUTOF10: 3
PAINLEVEL_OUTOF10: 0 - NO PAIN

## 2024-02-01 ASSESSMENT — PAIN DESCRIPTION - LOCATION
LOCATION: ABDOMEN

## 2024-02-01 ASSESSMENT — PATIENT HEALTH QUESTIONNAIRE - PHQ9
1. LITTLE INTEREST OR PLEASURE IN DOING THINGS: NOT AT ALL
1. LITTLE INTEREST OR PLEASURE IN DOING THINGS: NOT AT ALL
2. FEELING DOWN, DEPRESSED OR HOPELESS: NOT AT ALL
SUM OF ALL RESPONSES TO PHQ9 QUESTIONS 1 & 2: 0
2. FEELING DOWN, DEPRESSED OR HOPELESS: NOT AT ALL
SUM OF ALL RESPONSES TO PHQ9 QUESTIONS 1 & 2: 0

## 2024-02-01 ASSESSMENT — PAIN DESCRIPTION - DESCRIPTORS
DESCRIPTORS: ACHING

## 2024-02-01 ASSESSMENT — COLUMBIA-SUICIDE SEVERITY RATING SCALE - C-SSRS
1. IN THE PAST MONTH, HAVE YOU WISHED YOU WERE DEAD OR WISHED YOU COULD GO TO SLEEP AND NOT WAKE UP?: NO
6. HAVE YOU EVER DONE ANYTHING, STARTED TO DO ANYTHING, OR PREPARED TO DO ANYTHING TO END YOUR LIFE?: NO
2. HAVE YOU ACTUALLY HAD ANY THOUGHTS OF KILLING YOURSELF?: NO

## 2024-02-01 NOTE — DISCHARGE SUMMARY
Discharge Diagnosis  Symptomatic anemia  Acute blood loss   Retroperitoneal hematoma    Issues Requiring Follow-Up  Acute blood loss anemia    Test Results Pending At Discharge  Pending Labs       Order Current Status    Extra Urine Gray Tube In process    Urinalysis with Reflex Culture and Microscopic In process    Blood Culture Preliminary result    Blood Culture Preliminary result            Hospital Course   Katarina Merino is a 43-year-old female with a medical history remarkable for DVT x 2 (2018, 2020) , hemorrhagic CVA postpartum in 2019 while on Lovenox, iron deficiency anemia, and hypertension who presented to the [facility] emergency department status post syncopal episode while at home.  Patient is postop day 8 status post robotic total hysterectomy, LSO, right salpingectomy.  In the emergency department patient was found to be significantly anemic at 5.7 (baseline 11).  She was started on transfusion of 2 units PRBCs as well as 2L IV NS.  PT 26.7, INR 2.3.  Patient was given Kcentra and vitamin K.  Leukocytosis of 24.  She was given Cipro, Flagyl, and vancomycin for antibiotic coverage.  CT chest abdomen pelvis remarkable for a heterogenous lobulated collection extending from the pelvis to the right retroperitoneum measuring approximately 17 cm concerning for active extravasation.  There is also a small right intramuscular transverse abdominal hematoma measuring 1 cm.  Interventional radiology was consulted and patient was taken for urgent pelvic angiogram with bilateral uterine artery Gelfoam embolization.  She was admitted to the intensive care unit for further hemodynamic monitoring.  Since admission to the ICU patient has been hemodynamically stable, no pressor requirements.  Repeat a.m. hemoglobin 6.6, patient was transfused an additional 2 unit of PRBCs.  Gynecology consulted who recommends transfer [place] [facility] for possible need for surgical intervention should patient become unstable.      Pertinent Physical Exam At Time of Discharge  Physical Exam  General: Awake, alert/oriented x4, well developed, weak appearing  Head: Atraumatic/Normocephalic  Eyes: Normal external exam, EOMI, PERRLA  ENT: Oropharynx normal. Uvula midline, no tonsillar edema, moist mucous membranes  Cardiovascular: tachycardic, S1/S2, no murmurs, rubs, or gallops, radial pulses +2, no edema of extremities  Pulmonary: CTAB, no respiratory distress. No wheezes, rales, or ronchi  Abdomen: +BS, soft, mildly tender diffusely, nondistended, no guarding or rebound, no masses noted  MSK: No joint swelling, normal movements of all extremities. Range of motion- normal.  Extremities: FROM, no edema, wounds, or contusions  Skin- Surgical incision intact without any erythema, purulence, or induration; No lesions, contusions, or erythema.  Neuro: Alert/oriented x4, no focal motor or sensory deficits  Psychiatric: Judgment intact. Appropriate mood and behavior    Home Medications     Medication List      ASK your doctor about these medications     amLODIPine 10 mg tablet; Commonly known as: Norvasc; Ask about: Which   instructions should I use?   ascorbic acid 250 mg tablet; Commonly known as: Vitamin C   atorvastatin 10 mg tablet; Commonly known as: Lipitor   docusate sodium 100 mg capsule; Commonly known as: Colace; Take 2   capsules (200 mg) by mouth 2 times a day.   ferrous sulfate 300 mg (60 mg iron)/5 mL syrup; Ask about: Which   instructions should I use?   hydroCHLOROthiazide 25 mg tablet; Commonly known as: HYDRODiuril   labetalol 300 mg tablet; Commonly known as: Normodyne   multivitamin tablet   oxyCODONE-acetaminophen 5-325 mg tablet; Commonly known as: Percocet;   Take 1 tablet by mouth every 6 hours if needed for severe pain (7 - 10).   warfarin 5 mg tablet; Commonly known as: Coumadin       Outpatient Follow-Up  Future Appointments   Date Time Provider Department Center   2/8/2024  2:15 PM Kenroy Naidu MD [id number]        Chantal Orta, DO  Internal Medicine, PGY 2    I saw and evaluated the patient. I personally obtained the key and critical portions of the history and physical exam or was physically present for key and critical portions performed by the resident/fellow. I personally reviewed the lab values and radiological images. I reviewed the resident/fellow's documentation and discussed the patient with the resident/fellow. I agree with the resident/fellow's medical decision making as documented in the note.     Critical Care time : 40 min    Radha Salgado MD

## 2024-02-01 NOTE — TELEPHONE ENCOUNTER
Called patient twice this morning and left a message for her to check in    Was called last night from [facility] however patient was not stable for transfer to [facility] with hemoglobin of 5 and needing IR intervention    Have called patient to check in on her status.  Left message.

## 2024-02-01 NOTE — CARE PLAN
The patient's goals for the shift include      The clinical goals for the shift include Remain comfortable    Over the shift, the patient did not make progress toward the following goals. Barriers to progression include ***. Recommendations to address these barriers include ***.

## 2024-02-01 NOTE — NURSING NOTE
Patient discharged to [facility] TSICU bed 5 ([phone]) at time of 1441 via  critical care transport. Report called to [place]. Belongings returned. Patient's family at bedside and aware of discharge

## 2024-02-01 NOTE — CARE PLAN
The patient's goals for the shift include to get some rest tonight.    The clinical goals for the shift include To remain HDS and for H+H to remain stable    Over the shift, the patient did not make progress toward the following goals. Barriers to progression include H+H. Recommendations to address these barriers include to continue to monitor CBC and administer blood products as necessary per orders.

## 2024-02-01 NOTE — PROGRESS NOTES
Pharmacy Medication History Review    Katarina Merino is a 43 y.o. female admitted for Hemorrhagic shock (CMS/[facility]). Pharmacy reviewed the patient's [id number] medications and allergies for accuracy.    The list below reflects the updated PTA list. Comments regarding how patient may be taking medications differently can be found in the Admit Orders Activity  Prior to Admission Medications   Prescriptions Last Dose Informant   amLODIPine (Norvasc) 10 mg tablet  Self   Sig: Take 1 tablet (10 mg) by mouth once daily.   ascorbic acid (Vitamin C) 250 mg tablet  Self   Sig: Take 1 tablet (250 mg) by mouth once daily.   atorvastatin (Lipitor) 10 mg tablet  Self   Sig: Take 1 tablet (10 mg) by mouth once daily.   docusate sodium (Colace) 100 mg capsule  Self   Sig: Take 2 capsules (200 mg) by mouth 2 times a day.   enoxaparin (Lovenox) 150 mg/mL injection     Sig: Inject 1 mL (150 mg) under the skin every 12 hours.   ferrous sulfate 300 mg (60 mg iron)/5 mL syrup  Self   Sig: Take 5 mL (60 mg of iron) by mouth once daily.   hydroCHLOROthiazide (HYDRODiuril) 25 mg tablet  Self   Sig: Take 1 tablet (25 mg) by mouth once daily.   labetalol (Normodyne) 300 mg tablet  Self   Sig: Take 1 tablet (300 mg) by mouth 2 times a day.   multivitamin tablet  Self   Sig: Take 1 tablet by mouth once daily.   oxyCODONE-acetaminophen (Percocet) 5-325 mg tablet  Self   Sig: Take 1 tablet by mouth every 6 hours if needed for severe pain (7 - 10).   warfarin (Coumadin) 5 mg tablet  Self   Sig: Take 3 tablets (15 mg) by mouth once daily.      Facility-Administered Medications: None       The list below reflects the updated allergy list. Please review each documented allergy for additional clarification and justification.  Allergies  Reviewed by SMITH Cano-GARRICK on 2/1/2024        Severity Reactions Comments    Penicillins Low Rash             Patient declines M2B at discharge. Pharmacy has been updated to [facility] in [place]  [place].    Sources used to complete the med history include out patient fill history, OARRS, and patient interview - reliable historian    Below are additional concerns with the patient's PTA list.  At time of admission patient was still taking enoxaparin and warfarin until INR within goal    Velasquez Rivera PharmD  Transitions of Care Pharmacist  [facility]  Please reach out via Secure Chat for questions, or if no response call [facility] or [facility]

## 2024-02-01 NOTE — TELEPHONE ENCOUNTER
Called patient back and left message.  Will be transferred to [facility] under Dr. Hidalgo's care.  Will continue to follow closely

## 2024-02-01 NOTE — H&P
History Of Present Illness  Katairna Merino is a 43 y.o. female presenting with hemorrhagic shock.    Patient presents from OSH to [facility] SICU for further management of hemorrhagic shock.    43-year-old female with a medical history remarkable for DVT x 2 (2018, 2020) , hemorrhagic CVA postpartum in 2019 while on Lovenox, iron deficiency anemia, and hypertension who presented to the [facility] emergency department status post syncopal episode while at home 1/31.  Patient is s/p robotic total hysterectomy, LSO, right salpingectomy on 1/24/24 with Dr. Naidu at [facility]. In the ED patient was found to be significantly anemic at 5.7 (baseline 11).  She was started on transfusion of 2 units PRBCs as well as 2L IV NS.  PT 26.7, INR 2.3.  Patient was given Kcentra and vitamin K.  Leukocytosis of 24.  She was given Cipro, Flagyl, and vancomycin for antibiotic coverage.  CT chest abdomen pelvis remarkable for a heterogenous lobulated collection extending from the pelvis to the right retroperitoneum measuring approximately 17 cm concerning for active extravasation.  There is also a small right intramuscular transverse abdominal hematoma measuring 1 cm.  Interventional radiology was consulted and patient was taken for urgent pelvic angiogram with bilateral uterine artery Gelfoam embolization .  She was admitted to the intensive care unit for further hemodynamic monitoring.  Since admission to the ICU patient has been hemodynamically stable, no pressor requirements.  Repeat a.m. hemoglobin 6.6, patient was transfused an additional 2 unit of PRBCs.  Gynecology consulted who recommends transfer [place] [facility] [place] for possible surgical intervention. .     Past Medical History  Past Medical History:   Diagnosis Date    DVT of lower extremity (deep venous thrombosis) (CMS/[facility])     RLE - 2018 and 2020    Fibroid     Hyperlipidemia     Hypertension     Iron deficiency anemia due to chronic blood loss     Long term current use of anticoagulant      warfarin -  follows with Dr. So Escamilla    Menorrhagia with regular cycle     Stroke (CMS/[facility]) 2019    ~ 6 weeks post partum, residual right sided weekness    Vision loss     glasses       Surgical History  Past Surgical History:   Procedure Laterality Date     SECTION, LOW TRANSVERSE      CT HEAD ANGIO W AND WO IV CONTRAST  10/08/2019    CT HEAD ANGIO W AND WO IV CONTRAST 10/8/2019 [facility]    LAPAROSCOPIC HYSTERECTOMY      RLTH, LSO , B salpingectomy    OOPHORECTOMY Left     w RTH    TONSILLECTOMY      UMBILICAL HERNIA REPAIR      WISDOM TOOTH EXTRACTION          Social History  She reports that she has never smoked. She has never used smokeless tobacco. She reports current alcohol use of about 2.0 standard drinks of alcohol per week. She reports that she does not use drugs.    Family History  Family History   Problem Relation Name Age of Onset    Diabetes Mother      Hypertension Mother      Hypertension Father      Hyperlipidemia Father      Hypertension Brother      Hyperlipidemia Brother      Breast cancer Other Aunt         Allergies  Penicillins    Medications Prior to Admission   Medication Sig Dispense Refill Last Dose    amLODIPine (Norvasc) 10 mg tablet Take 1 tablet (10 mg) by mouth once daily.       ascorbic acid (Vitamin C) 250 mg tablet Take 1 tablet (250 mg) by mouth once daily.       atorvastatin (Lipitor) 10 mg tablet Take 1 tablet (10 mg) by mouth once daily.       docusate sodium (Colace) 100 mg capsule Take 2 capsules (200 mg) by mouth 2 times a day. (Patient not taking: Reported on 2024) 30 capsule 0     ferrous sulfate 300 mg (60 mg iron)/5 mL syrup Take 5 mL (60 mg of iron) by mouth once daily.       hydroCHLOROthiazide (HYDRODiuril) 25 mg tablet Take 1 tablet (25 mg) by mouth once daily.       labetalol (Normodyne) 300 mg tablet Take 1 tablet (300 mg) by mouth 2 times a day.       multivitamin tablet Take 1 tablet by mouth once daily.       oxyCODONE-acetaminophen  "(Percocet) 5-325 mg tablet Take 1 tablet by mouth every 6 hours if needed for severe pain (7 - 10). 30 tablet 0     warfarin (Coumadin) 5 mg tablet Take 3 tablets (15 mg) by mouth once daily.           Review of Systems   Constitutional:  Positive for fatigue.   HENT: Negative.     Eyes: Negative.    Respiratory: Negative.     Cardiovascular: Negative.    Gastrointestinal:  Positive for abdominal pain.        Abdominal fullness, diffuse bilateral flank, RLQ and LLQ pain   Endocrine: Negative.    Genitourinary: Negative.    Musculoskeletal: Negative.    Allergic/Immunologic: Negative.    Neurological: Negative.    Psychiatric/Behavioral: Negative.          Physical Exam  Vitals reviewed.   Constitutional:       Appearance: Normal appearance. She is ill-appearing.   HENT:      Head: Normocephalic.      Nose: Nose normal.      Mouth/Throat:      Mouth: Mucous membranes are dry.   Eyes:      Pupils: Pupils are equal, round, and reactive to light.   Cardiovascular:      Rate and Rhythm: Regular rhythm. Tachycardia present.      Pulses: Normal pulses.   Pulmonary:      Effort: Pulmonary effort is normal.      Breath sounds: Normal breath sounds.      Comments: Room air  Abdominal:      General: Abdomen is protuberant. Bowel sounds are decreased.      Palpations: Abdomen is soft.      Tenderness: There is generalized abdominal tenderness and tenderness in the right lower quadrant and left lower quadrant.      Comments: Lap site RIRI, healing, no erythema or drainage     Musculoskeletal:      Cervical back: Normal range of motion.   Neurological:      Mental Status: She is alert.   Psychiatric:         Mood and Affect: Mood normal.         Behavior: Behavior normal.          Last Recorded Vitals  Blood pressure (!) 116/95, pulse 105, temperature 36.2 °C (97.2 °F), temperature source Temporal, resp. rate 19, height 1.727 m (5' 8\"), weight 142 kg (313 lb 6.4 oz), last menstrual period 01/07/2024, SpO2 95 %, not currently " "breastfeeding.    Heart Rate:  []   Temp:  [36 °C (96.8 °F)-37.1 °C (98.8 °F)]   Resp:  [9-122]   BP: (114-148)/(59-95)   Height:  [172.7 cm (5' 8\")-173 cm (5' 8.11\")]   Weight:  [142 kg (313 lb 0.9 oz)-143 kg (314 lb 2.5 oz)]   SpO2:  [95 %-100 %]      Relevant Results  Scheduled medications  insulin lispro, 0-5 Units, subcutaneous, q4h  [START ON 2/2/2024] pantoprazole, 40 mg, intravenous, Daily before breakfast      Continuous medications  lactated Ringer's, 100 mL/hr, Last Rate: 100 mL/hr (02/01/24 1604)      PRN medications  PRN medications: dextrose 10 % in water (D10W), dextrose, glucagon              Assessment/Plan     Patient presents from OSH to [facility] SICU for further management of hemorrhagic shock.    43-year-old female with a medical history remarkable for DVT x 2 (2018, 2020) , hemorrhagic CVA postpartum in 2019 while on Lovenox, iron deficiency anemia, and hypertension who presented to the [facility] emergency department status post syncopal episode while at home 1/31.  Patient is s/p robotic total hysterectomy, LSO, right salpingectomy on 1/24/24 with Dr. Naidu.  In the ED patient was found to be significantly anemic at 5.7 (baseline 11).  She was started on transfusion of 2 units PRBCs as well as 2L IV NS.  PT 26.7, INR 2.3.  Patient was given Kcentra and vitamin K.  Leukocytosis of 24.  She was given Cipro, Flagyl, and vancomycin for antibiotic coverage.  CT chest abdomen pelvis remarkable for a heterogenous lobulated collection extending from the pelvis to the right retroperitoneum measuring approximately 17 cm concerning for active extravasation.  There is also a small right intramuscular transverse abdominal hematoma measuring 1 cm.  Interventional radiology was consulted and patient was taken for urgent pelvic angiogram with bilateral uterine artery Gelfoam embolization .  She was admitted to the intensive care unit for further hemodynamic monitoring.  Since admission to the ICU patient has " been hemodynamically stable, no pressor requirements.  Repeat a.m. hemoglobin 6.6, patient was transfused an additional 2 unit of PRBCs.  Gynecology consulted who recommends [facility] [place] [facility] for possible surgical intervention.     Neuro: History of hemorrhagic CVA in 2019 without deficits. Syncopal episode on 1/31 at house, found by . Currently A&O x3. OSH management of acute pain with IV morphine and hydromorphone.  - ongoing neuro and pain assessments  - prn hydromorphone  - PT/OT   - home meds: oxycodone-acetaminophen 5-325mg q6h prn    CV: History of HTN and HLD. Baseline -130/70-80s per chart review. No TTE on file. High sensitivity troponin 74, 63 at OSH. Lactate normalized 1/31 after volume resuscitation. Arrived to SICU hemodynamically intact without vasoactive support. Tachycardia to 105, endorses pain.  - goal map range 65-85  - volume/products as indicated  - consider TTE if becomes clinically unstable  - home meds: Norvasc 5mg, Lipitor 10mg, Labetalol 300mg BID, hydrochlorothiazide 25mg     PULM: No history of pulmonary disease. No active distress. Arrived to SICU on room air  - obtain cxr   - goal SpO2 > 92%   - IS hourly while awake     GI: No history of GI disease. Acute abdominal pain since recent surgery. Elevated ALT, downtrended this am. Last BM 2 days ago  - NPO except for meds  - PPI ppx  - serial abdominal exams  - bowel regimen     GYN: History of Endometriosis and Leiomyoma. Recent ANTHONY-BSO 1/24/24. CT C/A/P showing heterogenous lobulated collection extending from pelvis to right retroperitoneum measuring 17cm likely related to recent hysterectomy. Probable small right intramuscular transverse abdominis hematoma measuring 1cm  -await OB/Gyn recs    : No history of renal disease. Baseline creatinine 0.93. Hyponatremia to 131 this am. Borderline hyperkalemia to 5.2. Rose cath inserted 2/1/24  - RFP now, daily and prn  - hourly I&Os  - continue rose cath  - replete  electrolytes judiciously      HEME: Baseline H/H 11/37. History of Iron Deficiency Anemia. History of right leg DVT x2 (on Coumadin). Coumadin has been stopped since 1/13/24, Lovenox injections until her procedure. Restarted Coumadin on 1/27. Hemoglobin 5.7 on admit to ED s/p 2 units pRBC with appropriate increment to 7.4. INR therapeutic on admission, decreased to 1.1 after Kcentra and Vitamin K. IR consulted, patient taken for pelvic angioembolization s/p bilateral uterine artery Gelfoam embolization overnight 1/31-2/1. Hgb drop to 6.6 this am, transfused additional 2 RBC prior to transfer to [facility].  - obtain cbc, coags, fibrinogen now, daily and prn  - obtain T&S, place 4rbc, 4ffp on hold  - Transfuse if hgb < 7   - scds, hold subcutaneous heparin   - home meds: MVI, ascorbic acid, ferrous sulfate, warfarin      ID: Leukocytosis on admission 24.0, uptrend to 29.7. Blood cultures x2 1/31 NGTD. Started on Cipro, Vanc, Flagyl 1/31. UA negative 2/1. MRSA screen negative 2/1.  - continue cipro and flagyl per gyn recs  - follow up cultures   - ongoing infection surveillance    Endocrine: No history of endocrinopathies  -Blood glucose goal <180  - SSI #1  - q4h BG checks    Lines, Tubes, and Drains:   Hernandez Catheter (2/1/24)    PIV x2 (20g)     Code Status: Full code    Family:  and sister in-law on the way     Disposition: ICU admit. Patient seen and discussed with Dr. Leiva.            I spent 45 minutes critical care time on this patient.      Mimi Watson, APRN-CNP

## 2024-02-01 NOTE — H&P
History Of Present Illness  Katarina Merino is a 43 y.o. female presenting with a syncopal episode that occurred earlier today while she was on the toilet. Did not hit her head, however,  found her unconscious on the toilet. She remembers going to the bathroom, but doesn't remember anything after that. She recently had a ANTHONY-BSO 1 week ago due to endometriosis, menorrhagia, and a symptomatic fibroid 9 x 9 x 8 cm. Regarding her menorrhagia, she mentioned her period was very heavy, usually lasted about 7-8 days, having to use multiple pads which started 4 years ago. Since the procedure, she mentions she has been feeling pretty good, but a couple days ago, she started having some abdominal pain, but had some Oxycodone which was relieving the pain. However, today after being found unconscious, her  decided to call EMS to bring her to the ED. Upon examination, she just returned from IR suite, resting comfortable in no acute distress. Denying any current pain, besides minimal diffuse abdominal pain. No other complaints at this time, denies chest pain, nausea, vomiting, headache, constipation, diarrhea, cough, any recent sick contacts, or any recent infection    ED course:  Vitals: Afebrile, /84, , RR 24, breathing 100% oxygen on room air  Labs: CMP essentially unremarkable, CBC with leukocytosis, 24, hemoglobin 5.7 (baseline 11), repeat 7.4 s/p 2 units PRBC.  PT 26.7, INR 2.3 (decreased to 1.1 after Kcentra and vitamin K).  Lactate 2.3, improved to 1.4  Imaging: CT C/A/P showing heterogeneous lobulated collection extending from the pelvis to the right retroperitoneum measuring 17 cm concerning for active extravasation, likely related to recent hysterectomy.  There is also small right intramuscular transverse abdominal hematoma, measuring 1 cm    In the ED, she was given ciprofloxacin, vancomycin, and Flagyl for antibiotic coverage.  She was given morphine and Dilaudid for pain, Zofran for nausea.   Patient's INR was 2.2 (therapeutic because patient is on Coumadin), however, after Kcentra and vitamin K, INR decreased to 1.1.  IR was consulted who then took patient to IR suite for pelvic angioembolization.  She was then admitted to the ICU     Past Medical History  HTN, HLD, iron deficiency anemia, CVA without residual deficits, DVT on Coumadin     Surgical History  Laparoscopic hysterectomy, herniorrhaphy,  section     Social History  She reports that she has never smoked. She has never used smokeless tobacco. She reports current alcohol use of about 2.0 standard drinks of alcohol per week. She reports that she does not use drugs.    Family History  Family History   Problem Relation Name Age of Onset    Diabetes Mother      Hypertension Mother      Hypertension Father      Hyperlipidemia Father      Hypertension Brother      Hyperlipidemia Brother      Breast cancer Other Aunt         Allergies  Penicillins    Review of Systems  ROS negative unless stated otherwise in HPI    Physical Exam  Constitutional:       Appearance: Normal appearance.   HENT:      Head: Normocephalic and atraumatic.   Eyes:      Conjunctiva/sclera: Conjunctivae normal.   Cardiovascular:      Rate and Rhythm: Normal rate and regular rhythm.      Pulses: Normal pulses.      Heart sounds: Normal heart sounds.   Pulmonary:      Effort: Pulmonary effort is normal.      Breath sounds: Normal breath sounds.   Abdominal:      General: Bowel sounds are normal.      Palpations: Abdomen is soft.      Comments: Surgical sites intact without erythema, purulence, or induration   Musculoskeletal:      Cervical back: Neck supple.   Skin:     General: Skin is warm and dry.   Neurological:      Mental Status: She is alert and oriented to person, place, and time.   Psychiatric:         Mood and Affect: Mood normal.     Last Recorded Vitals  Blood pressure (!) 130/92, pulse 98, temperature 36.2 °C (97.2 °F), temperature source Temporal, resp. rate  "(!) 122, height 1.73 m (5' 8.11\"), weight 142 kg (313 lb 0.9 oz), last menstrual period 01/07/2024, SpO2 100 %.    Scheduled medications  vancomycin, 2,000 mg, intravenous, Once      Continuous medications     PRN medications  Results for orders placed or performed during the hospital encounter of 01/31/24 (from the past 24 hour(s))   CBC and Auto Differential   Result Value Ref Range    WBC 24.0 (H) 4.4 - 11.3 x10*3/uL    nRBC 0.2 (H) 0.0 - 0.0 /100 WBCs    RBC 2.69 (L) 4.00 - 5.20 x10*6/uL    Hemoglobin 5.7 (LL) 12.0 - 16.0 g/dL    Hematocrit 19.4 (L) 36.0 - 46.0 %    MCV 72 (L) 80 - 100 fL    MCH 21.2 (L) 26.0 - 34.0 pg    MCHC 29.4 (L) 32.0 - 36.0 g/dL    RDW 17.1 (H) 11.5 - 14.5 %    Platelets 349 150 - 450 x10*3/uL    Neutrophils % 78.8 40.0 - 80.0 %    Immature Granulocytes %, Automated 3.3 (H) 0.0 - 0.9 %    Lymphocytes % 10.1 13.0 - 44.0 %    Monocytes % 7.4 2.0 - 10.0 %    Eosinophils % 0.2 0.0 - 6.0 %    Basophils % 0.2 0.0 - 2.0 %    Neutrophils Absolute 18.90 (H) 1.20 - 7.70 x10*3/uL    Immature Granulocytes Absolute, Automated 0.78 (H) 0.00 - 0.70 x10*3/uL    Lymphocytes Absolute 2.42 1.20 - 4.80 x10*3/uL    Monocytes Absolute 1.78 (H) 0.10 - 1.00 x10*3/uL    Eosinophils Absolute 0.05 0.00 - 0.70 x10*3/uL    Basophils Absolute 0.04 0.00 - 0.10 x10*3/uL   Comprehensive metabolic panel   Result Value Ref Range    Glucose 135 (H) 74 - 99 mg/dL    Sodium 132 (L) 136 - 145 mmol/L    Potassium 4.1 3.5 - 5.3 mmol/L    Chloride 99 98 - 107 mmol/L    Bicarbonate 25 21 - 32 mmol/L    Anion Gap 12 10 - 20 mmol/L    Urea Nitrogen 16 6 - 23 mg/dL    Creatinine 1.01 0.50 - 1.05 mg/dL    eGFR 72 >60 mL/min/1.73m*2    Calcium 8.7 8.6 - 10.3 mg/dL    Albumin 3.6 3.4 - 5.0 g/dL    Alkaline Phosphatase 58 33 - 110 U/L    Total Protein 6.1 (L) 6.4 - 8.2 g/dL    AST 29 9 - 39 U/L    Bilirubin, Total 0.3 0.0 - 1.2 mg/dL    ALT 82 (H) 7 - 45 U/L   Coagulation Screen   Result Value Ref Range    Protime 26.7 (H) 9.8 - 12.8 " seconds    INR 2.3 (H) 0.9 - 1.1    aPTT 52 (H) 27 - 38 seconds   Troponin I, High Sensitivity, Initial   Result Value Ref Range    Troponin I, High Sensitivity 74 (HH) 0 - 13 ng/L   B-type natriuretic peptide   Result Value Ref Range    BNP 15 0 - 99 pg/mL   Lactate   Result Value Ref Range    Lactate 2.3 (H) 0.4 - 2.0 mmol/L   Blood Culture    Specimen: Peripheral Venipuncture; Blood culture   Result Value Ref Range    Blood Culture Loaded on Instrument - Culture in progress    Blood Culture    Specimen: Peripheral Venipuncture; Blood culture   Result Value Ref Range    Blood Culture Loaded on Instrument - Culture in progress    Troponin, High Sensitivity, 1 Hour   Result Value Ref Range    Troponin I, High Sensitivity 63 (HH) 0 - 13 ng/L   Lavender Top   Result Value Ref Range    Extra Tube Hold for add-ons.    VERIFY ABO/Rh Group Test   Result Value Ref Range    ABO TYPE O     Rh TYPE POS    Lactate   Result Value Ref Range    Lactate 1.4 0.4 - 2.0 mmol/L   Type and screen   Result Value Ref Range    ABO TYPE O     Rh TYPE POS     ANTIBODY SCREEN NEG    Prepare RBC: 2 Units   Result Value Ref Range    PRODUCT CODE S2449Q57     Unit Number [id number]     Unit ABO O     Unit RH POS     XM INTEP COMP     Dispense Status TR     Blood Expiration Date February 19, 2024 23:59 EST     PRODUCT BLOOD TYPE 5100     UNIT VOLUME 350     PRODUCT CODE J9239G54     Unit Number [id number]     Unit ABO O     Unit RH POS     XM INTEP COMP     Dispense Status TR     Blood Expiration Date February 22, 2024 23:59 EST     PRODUCT BLOOD TYPE 5100     UNIT VOLUME 400    CBC and Auto Differential   Result Value Ref Range    WBC 28.6 (H) 4.4 - 11.3 x10*3/uL    nRBC 0.3 (H) 0.0 - 0.0 /100 WBCs    RBC 3.12 (L) 4.00 - 5.20 x10*6/uL    Hemoglobin 7.4 (L) 12.0 - 16.0 g/dL    Hematocrit 23.8 (L) 36.0 - 46.0 %    MCV 76 (L) 80 - 100 fL    MCH 23.7 (L) 26.0 - 34.0 pg    MCHC 31.1 (L) 32.0 - 36.0 g/dL    RDW 18.8 (H) 11.5 - 14.5 %     Platelets 309 150 - 450 x10*3/uL    Neutrophils % 78.4 40.0 - 80.0 %    Immature Granulocytes %, Automated 3.0 (H) 0.0 - 0.9 %    Lymphocytes % 10.8 13.0 - 44.0 %    Monocytes % 7.3 2.0 - 10.0 %    Eosinophils % 0.3 0.0 - 6.0 %    Basophils % 0.2 0.0 - 2.0 %    Neutrophils Absolute 22.40 (H) 1.20 - 7.70 x10*3/uL    Immature Granulocytes Absolute, Automated 0.86 (H) 0.00 - 0.70 x10*3/uL    Lymphocytes Absolute 3.10 1.20 - 4.80 x10*3/uL    Monocytes Absolute 2.09 (H) 0.10 - 1.00 x10*3/uL    Eosinophils Absolute 0.09 0.00 - 0.70 x10*3/uL    Basophils Absolute 0.05 0.00 - 0.10 x10*3/uL   Coagulation Screen   Result Value Ref Range    Protime 12.3 9.8 - 12.8 seconds    INR 1.1 0.9 - 1.1    aPTT 44 (H) 27 - 38 seconds     CT angio chest abdomen pelvis    Result Date: 1/31/2024  Interpreted By:  Trinity Saldana, STUDY: CT ANGIO CHEST ABDOMEN PELVIS;  1/31/2024 8:55 pm   INDICATION: Signs/Symptoms:post op acute blood loss anemia. 1 week status post laparoscopic abdominal hysterectomy. Blood thinners.   COMPARISON: None.   ACCESSION NUMBER(S): [id number]   ORDERING CLINICIAN: WAYLON SHERMAN   TECHNIQUE: Axial CT images of the chest, abdomen and pelvis  before and after intravenous administration of contrast using CT angiographic technique. Coronal and sagittal images are reconstructed.  3D reconstructions were obtained at a separate workstation.   FINDINGS: VASCULAR: AORTA: Initial noncontrast images demonstrate no aortic intramural hematoma. No aortic aneurysm or dissection. No significant atherosclerotic disease.   CHEST: Motion artifact partially limits evaluation of the chest. HEART: Normal size. No pericardial effusion. MEDIASTINUM AND ANASTASIIA: No pathologically enlarged thoracic lymph nodes. LUNG, PLEURA, LARGE AIRWAYS: Small pleural effusions with adjacent atelectasis no sizable pneumothorax no suspicious nodule. Central airways are patent. CHEST WALL AND LOWER NECK: Visualized thyroid is unremarkable. No acute osseous  abnormality identified. No significant axillary lymphadenopathy.   ABDOMEN:   BONES: No acute osseous abnormality. ABDOMINAL WALL: Diastasis of the abdominal wall with right abdominal flank stranding. Possible small right intramuscular hematoma with asymmetric enlargement in the region of the right transverse abdominus measuring up to 2.6 cm on the right and 1.5 cm on the left for comparison.   LIVER: Within normal limits. BILE DUCTS: Normal caliber. GALLBLADDER: No calcified gallstones. No wall thickening. PANCREAS: Within normal limits. SPLEEN: Within normal limits. ADRENALS:  Within normal limits. KIDNEYS: Symmetric renal enhancement. No hydronephrosis or perinephric fluid collection. URETERS: No hydroureter.   PELVIS: There is a heterogeneous hyperdense region within the pelvis between the rectum and urinary bladder measuring up to 8.2 cm (AP) with extension into the lower right abdominal retroperitoneum measuring up to 17.0 cm (cc) with probable fluid fluid level. There is an internal linear hyperdense regions noted (sagittal series 503, image 116/243) which slightly dissipate on portal phase imaging and are concerning for regions of internal active extravasation into a large hemorrhagic collection.   REPRODUCTIVE ORGANS: Postsurgical changes are consistent with a recent hysterectomy however evaluation of the pelvis is limited due to previously described collections. BLADDER: Contrast within the urinary bladder likely related to prior contrast enhanced CT imaging.   RETROPERITONEUM: Collections extending from the pelvis into the retroperitoneum as well as scattered ascites. BOWEL: Evaluation of bowel is partially limited due to adjacent fluid and collections. No dilated bowel identified. The appendix likely obscured by fluid and collections. Moderate stool noted in the colon. A partially calcified wall lesion in the left lower quadrant measuring up to 7.6 cm with internal fat attenuation may represent fat  necrosis.. PERITONEUM: Moderate abdominopelvic ascites predominantly perihepatic and perisplenic. Pelvic and lower abdominal collection as previously described. No free air.       No aortic aneurysm or dissection.   Heterogeneous lobulated collection extending from the pelvis to the right retroperitoneum measuring up to 17 cm (cc). Few serpiginous regions of enhancement are noted on arterial phase imaging within the collections concerning for active extravasation. Findings likely related to recent hysterectomy, given location are centered in the surgical bed.   Postsurgical changes along predominantly the right lateral abdominal wall a probable small right intramuscular transverse abdominis hematoma measuring up to 1 cm.   Moderate ascites, trace pleural effusions, atelectasis and additional findings as detailed.   MACRO: Trinity Saldana discussed the significance and urgency of this critical finding by telephone with  Dr. Topete on 1/31/2024 at 9:35 pm.  (**-RCF-**) Findings:  See findings.   Signed by: Trinity Saldana 1/31/2024 9:38 PM Dictation workstation:   [id number]    ECG 12 lead (Clinic Performed)    Result Date: 1/15/2024  Normal sinus rhythm Normal ECG No previous ECGs available Confirmed by Geronimo Hernandez ([id number]) on 1/15/2024 2:20:45 PM    BI mammo bilateral screening tomosynthesis    Result Date: 1/12/2024  Interpreted By:  Mery Brooke, STUDY: BI MAMMO BILATERAL SCREENING TOMOSYNTHESIS;  1/10/2024 7:04 am   ACCESSION NUMBER(S): [id number]   ORDERING CLINICIAN: MARITA VAN   INDICATION: Screening.     COMPARISON: 09/29/2022   FINDINGS: 2D and tomosynthesis images were reviewed at 1 mm slice thickness.   Density:  The breast tissue is heterogeneously dense, which may obscure small masses.   No suspicious masses or calcifications are identified.       No mammographic evidence of malignancy.   BI-RADS CATEGORY:   BI-RADS Category:  1 Negative. Recommendation:  Routine Screening Mammogram in 1 Year.  Recommended Date:  1 Year. Laterality:  Bilateral.   For any future breast imaging appointments, please call [phone]).     MACRO: None   Signed by: Mery Brooke 1/12/2024 8:45 AM Dictation workstation:   [id number]        Assessment/Plan   Principal Problem:    Symptomatic anemia    43 year old female with recent ANTHONY-BSO, iron deficiency anemia, CVA without deficits, DVT (on Coumadin) presents with a syncopal episode and symptomatic anemia with imaging notable for heterogeneous lobulated collection with concern for active extravasation    #Syncope  #Recent ANTHOYN-BSO  #Iron Deficiency Anemia  #Endometriosis  #CVA without deficits  #History of DVT (on Coumadin)  #HTN  #HLD  #Morbid obesity      Neuro:   Syncopal episode on 1/31 at house  History of CVA without deficits  A&O x3  Morpine 4mg, Dilaudid given for pain  Zofran 4mg given for nausea/vomiting    Cardiovascular:   History of HTN and HLD  Monitor hemodynamics, Keep MAP > 65   High sensitivity troponin 74, 63  Continue home Norvasc 5mg  Continue home Lipitor 10mg  Continue home Labetalol 300mg BID  No Echo on record, defer to day team whether to order    Respiratory:   No current respiratory distress  Not on home O2  Keep goal SpO2 > 92%    Gastrointestinal:   No acute issues  ALT elevated compared to normal (82), AST normal  Diet: NPO until after procedure    Renal/Genitourinary:   History of Endometriosis and Leiomyoma  Recent ANTHONY-BSO 1/24/24  CT C/A/P showing heterogenous lobulated collection extending from pelvis to right retroperitoneum measuring 17cm likely related to recent hysterectomy  Probable small right intramuscular transverse abdominis hematoma measuring 1cm  No evidence of acute kidney injury  Monitor Is and Os  OB/Gyn on consult, appreciate recs    FEN: Electrolytes: Replete per ICU protocol.     Hematologic:   History of Iron Deficiency Anemia  History of right leg DVT x2 (on Coumadin)  Hemoglobin 5.7 on admission (baseline 11) s/p 2  units pRBC with appropriate increment to 7.4  Coumadin has been stopped since 1/13/24, Lovenox injections until her procedure. Restarted Coumadin on 1/27  INR therapeutic on admission, decreased to 1.1 after Kcentra and Vitamin K  IR consulted, to take patient for pelvic angioembolization  Transfuse if hgb < 7    Endocrine:   No acute concerns  Blood glucose goal <180    Infectious Disease:   Leukocytosis on admission, 28.6  Blood cultures x2 ordered, results pending  Cipro, Vanc, Flagyl given in ED, will continue    Lines, Tubes, and Drains: Hernandez Catheter (2/1/24)      Code Status: Full    Disposition: 43 year old female with recent ANTHONY-BSO admitted for syncope and symptomatic anemia s/p pelvic angioembolization. Anticipate 1 midnight in the ICU      To be staffed with Dr. Damian Swartz MD  Internal Medicine, PGY-3    I saw and evaluated the patient. I personally obtained the key and critical portions of the history and physical exam or was physically present for key and critical portions performed by the resident/fellow. I personally reviewed the lab values and radiological images. I reviewed the resident/fellow's documentation and discussed the patient with the resident/fellow. I agree with the resident/fellow's medical decision making as documented in the note.     Critical Care time : 40 min    Radha Salgado MD

## 2024-02-01 NOTE — PROGRESS NOTES
"Vancomycin Dosing by Pharmacy- INITIAL    Katarina Merino is a 43 y.o. year old female who Pharmacy has been consulted for vancomycin dosing for other intra-abdominal infection . Based on the patient's indication and renal status this patient will be dosed based on a goal AUC of 400-600.     Renal function is currently stable.    Visit Vitals  /76 (BP Location: Right arm, Patient Position: Lying)   Pulse 107   Temp 36.1 °C (97 °F) (Temporal)   Resp 21        Lab Results   Component Value Date    CREATININE 0.93 02/01/2024    CREATININE 1.01 01/31/2024    CREATININE 0.76 10/24/2019    CREATININE 0.74 10/17/2019    CREATININE 0.67 10/15/2019    CREATININE 0.64 10/14/2019        Patient weight is No results found for: \"PTWEIGHT\"    No results found for: \"CULTURE\"     No intake/output data recorded.  [unfilled]    Lab Results   Component Value Date    PATIENTTEMP 37.0 09/30/2019    PATIENTTEMP 37.0 09/30/2019          Assessment/Plan     Patient has already been given a loading dose of 2000 mg.  Will initiate vancomycin maintenance,  1250 mg every 12 hours.    This dosing regimen is predicted by [employer] to result in the following pharmacokinetic parameters:    Loading dose: N/A  Regimen: 1250 mg IV every 12 hours.  Start time: 08:25 on 02/01/2024  Exposure target: AUC24 (range)400-600 mg/L.hr   AUC24,ss: 560 mg/L.hr  Probability of AUC24 > 400: 74 %  Ctrough,ss: 15.6 mg/L  Probability of Ctrough,ss > 20: 38 %  Probability of nephrotoxicity (Lodise MENDEZ 2009): 11 %    Follow-up level will be ordered on 2/3 at with AM labs unless clinically indicated sooner.  Will continue to monitor renal function daily while on vancomycin and order serum creatinine at least every 48 hours if not already ordered.  Follow for continued vancomycin needs, clinical response, and signs/symptoms of toxicity.       Ebony Judge, PharmD       "

## 2024-02-01 NOTE — PROGRESS NOTES
GYN progress note:    S: Pt reports pelvic pain 7/10.  Receiving IV morphine,  No nausea/vomiting/chest pain/dyspnea.    O:       2/1/2024     8:04 AM 2/1/2024     9:00 AM 2/1/2024     9:30 AM 2/1/2024    10:00 AM 2/1/2024    10:48 AM 2/1/2024    11:00 AM 2/1/2024    11:03 AM   Vitals   Systolic 133 126 126 114 117  124   Diastolic 83 90 78 93 87  88   Heart Rate 100 104 102 106 106 104 113   Temp 36 °C (96.8 °F)  36.4 °C (97.5 °F)  36 °C (96.8 °F)  36.2 °C (97.2 °F)   Resp 17 22 20 19 23 23 26     Abdomen: diffusely tender, +guarding, mildly distended soft      A/P   42 yo female POD#8 s/p robotic total hyst, LSO, right salpingectomy admitted with syncope, acute blood loss anemia, pelvic hematoma extending into the retroperitoneal space.    D/w pt and her family often managed with IR and supportive care, often avoiding surgical intervention.  However, would recommend transfer to [facility] just in case she becomes unstable and requires surgical intervention as there is GYN/ONC present to assist with the exploration of the retroperitoneal space.   Pt and family agree with plan, reviewed with ICU attending at [facility] and [facility], Gyn team aware also.      Neuro:   - IV morphine prn currently    CV/Heme:  - Hgb 5.7 --> 2 units PRBC -- > 7.4 --> IR UAE --> 6.6 this AM now getting second unit PRBC  - slightly tachycardic, second  unit infusing    Pulm:   - on RA    FEN/GI:   - NPO for now      :   - Hernandez placed  - UOP adequate; continue to monitor    ID:   - on vanco/cipro/flagyl given large pelvic hematoma and WBC 29    Endo:   - no issues    DVT Prophylaxis:  - SCDs in place          Nataliia Armas MD

## 2024-02-01 NOTE — POST-PROCEDURE NOTE
Interventional Radiology Brief Postprocedure Note    Attending: August Jacobs MD      Assistant: none    Diagnosis: Post operative pelvic hematoma    Description of procedure: Pelvic angiogram with bilateral uterine artery gelfoam embolization. Questionable extravasation bilaterally, consistent with the CTA findings.      Anesthesia:  Pain control Fentanyl 50 mcg IV    Complications: None    Estimated Blood Loss:  10 cc's    Medications  As of 02/01/24 0343      morphine injection 4 mg (mg) Total dose:  8 mg Dosing weight:  127      Date/Time Rate/Dose/Volume Action       01/31/24  1609 4 mg Given      1844 4 mg Given               ondansetron (Zofran) injection 4 mg (mg) Total dose:  8 mg Dosing weight:  127      Date/Time Rate/Dose/Volume Action       01/31/24  1610 4 mg Given      2216 4 mg Given               sodium chloride 0.9 % bolus 1,986 mL (mL/hr) Total volume:  Not documented* Dosing weight:  66.2   *Total volume has not been documented. View each administration to see the amount administered.     Date/Time Rate/Dose/Volume Action       01/31/24  1610 2,000 mL - 993 mL/hr (over 120 min) New Bag      1810  (over 120 min) Stopped               iohexol (OMNIPaque) 350 mg iodine/mL solution 90 mL (mL) Total volume:  90 mL Dosing weight:  127      Date/Time Rate/Dose/Volume Action       01/31/24  2030 90 mL Given               piperacillin-tazobactam-dextrose (Zosyn) IV 3.375 g (g) Total dose:  0 g* Dosing weight:  127   *Administration not included in total     Date/Time Rate/Dose/Volume Action       01/31/24 2025 *3.375 g - 100 mL/hr (over 30 min) Missed               ciprofloxacin (Cipro) IVPB 400 mg (mL/hr) Total volume:  Not documented* Dosing weight:  127   *Total volume has not been documented. View each administration to see the amount administered.     Date/Time Rate/Dose/Volume Action       02/01/24  0035 400 mg - 200 mL/hr (over 60 min) New Bag      0135  (over 60 min) Stopped                metroNIDAZOLE (Flagyl) 500 mg in NaCl (iso-os) 100 mL (mg) Total dose:  500 mg Dosing weight:  127      Date/Time Rate/Dose/Volume Action       01/31/24  2259 500 mg (over 60 min) New Bag     02/01/24  0034  (over 60 min) Stopped               phytonadione (Vitamin K) 10 mg in dextrose 5 % in water (D5W) 50 mL IV (mL/hr) Total volume:  Not documented* Dosing weight:  127   *Total volume has not been documented. View each administration to see the amount administered.     Date/Time Rate/Dose/Volume Action       01/31/24 2237 10 mg - 153 mL/hr (over 20 min) New Bag     02/01/24 0034  (over 20 min) Stopped               four factor human prothrombin complex concentrate (Kcentra) 2,500 Units in sterile water 100 mL infusion (mL/hr) Total volume:  Not documented* Dosing weight:  127   *Total volume has not been documented. View each administration to see the amount administered.     Date/Time Rate/Dose/Volume Action       01/31/24 2216 2,500 Units - 500 mL/hr (over 12 min) New Bag      2236  (over 12 min) Stopped               HYDROmorphone (Dilaudid) injection 0.5 mg (mg) Total dose:  0.5 mg Dosing weight:  127      Date/Time Rate/Dose/Volume Action       01/31/24 2217 0.5 mg Given               calcium gluconate in NS IV 2 g (mL/hr) Total volume:  Not documented* Dosing weight:  127   *Total volume has not been documented. View each administration to see the amount administered.     Date/Time Rate/Dose/Volume Action       01/31/24  2330 2 g - 100 mL/hr (over 60 min) New Bag     02/01/24  0057  (over 60 min) Stopped               fentaNYL PF (Sublimaze) injection (mcg) Total dose:  50 mcg      Date/Time Rate/Dose/Volume Action       02/01/24  0220 50 mcg Given                   No specimens collected      See detailed result report with images in PACS.    The patient tolerated the procedure well without incident or complication and is in stable condition.

## 2024-02-01 NOTE — CARE PLAN
Problem: Chronic Conditions and Co-morbidities  Goal: Patient's chronic conditions and co-morbidity symptoms are monitored and maintained or improved  Outcome: Adequate for Discharge     Problem: Safety - Adult  Goal: Free from fall injury  Outcome: Met     Problem: Discharge Planning  Goal: Discharge to home or other facility with appropriate resources  Outcome: Met     Problem: Pain  Goal: Takes deep breaths with improved pain control throughout the shift  Outcome: Met  Goal: Turns in bed with improved pain control throughout the shift  Outcome: Met  Goal: Performs ADL's with improved pain control throughout shift  Outcome: Met  Goal: Free from opioid side effects throughout the shift  Outcome: Met  Goal: Free from acute confusion related to pain meds throughout the shift  Outcome: Met     Problem: Fall/Injury  Goal: Not fall by end of shift  Outcome: Met  Goal: Be free from injury by end of the shift  Outcome: Met  Goal: Verbalize understanding of personal risk factors for fall in the hospital  Outcome: Met  Goal: Verbalize understanding of risk factor reduction measures to prevent injury from fall in the home  Outcome: Met     Problem: Indwelling Catheter Maintenance  Goal: I will have no complications from indwelling catheter  Outcome: Met     Problem: Skin  Goal: Participates in plan/prevention/treatment measures  Outcome: Met  Goal: Prevent/minimize sheer/friction injuries  Outcome: Met  Goal: Promote skin healing  Outcome: Met     Problem: Pain - Adult  Goal: Verbalizes/displays adequate comfort level or baseline comfort level  Outcome: Not met   The patient's goals for the shift include      The clinical goals for the shift include Patient will remain free from any s/s of active bleeding through the end of this shift    Over the shift, the patient did not make progress toward the following goals. Barriers to progression include acuteness of illness. Continue with the plan of care. Patient transferring to  [facility] for higher level of care

## 2024-02-01 NOTE — CARE PLAN
The patient's goals for the shift include to get some rest tonight.    The clinical goals for the shift include To remain HDS and for H+H to remain stable    Over the shift, the patient did not make progress toward the following goals. Barriers to progression include low H+H this morning. Recommendations to address these barriers include PRBC's.

## 2024-02-01 NOTE — CONSULTS
Obstetrical Consult    Reason for Consult: retroperitoneal hematoma s/p hysterectomy on 1/24    Assessment/Plan    Pt is 42 yo with known 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 s/p robotic total hysterectomy, LSO, right salpingectomy for AUB-L transferred to [facility] ICU in the s/o 17 cm retroperitoneal hematoma s/p bilateral UAE with concern for continued intraabdominal bleeding.    Retroperitoneal hematoma  - CT remarkable for a 17 cm pelvic hematoma extending into retroperitoneum  - s/p IR bilateral UAE on 2/1   - Hgb 5.7 -> 2 U RBC -> 7.4 -> IR -> 6.6 -> 2 U -> now 8.4 -> 8.1  - Abdominal exam benign, VSS, no acute operative intervention indicated  - WBC 30 and given higher risk for superimposed hematoma infection, would continue abx cipro/flagyl   - Hold AC at this time  - Would trend labs q 6 hours  - NPO at this time   - If Hgb persistently downtrending, s/s superimposed infection, vital sign changes, please contact us for re-evaluation for OR    Discussed and seen with Dr. Laurence Currie MD  Gyn Pager [phone]  Gyn Phone [phone] 5 P - 6:30 A; [phone] 6:30 A - 5 P    Subjective   She presented to the [facility] emergency department status post syncopal episode while at home witnessed by . Patient is postop day 8 status post robotic total hysterectomy, LSO, right salpingectomy. Known chronic DVT (2018, 2020) and CVA postpartum on coumadin, in the postop setting was on lovenox bridge and warfarin. In the emergency department patient was found to be significantly anemic at 5.7 (baseline 11).  She was started on transfusion of 2 units PRBCs. Labs notable for PT 26.7, INR 2.3. Patient was given Kcentra and vitamin K. Leukocytosis of 24. She was given Cipro, Flagyl, and vancomycin for antibiotic coverage. CT remarkable for a 17 cm pelvic hematoma extending into retroperitoneum measuring approximately with c/f for active extravasation. IR took her for urgent pelvic angiogram with bilateral uterine artery Gelfoam embolization.  In the  [facility] ICU, her hemoglobin continued to downtrend to 6.6, patient was transfused an additional 2 unit of PRBCs with increment to 8.1 Given c/f possible surgical intervention, patient transferred to [facility] ICU.    On arrival, VS with /95, , RR 19. Endorsing abdominal discomfort. No N/V, LH.     Obstetrical History   OB History    Para Term  AB Living   3 3       3   SAB IAB Ectopic Multiple Live Births           3      # Outcome Date GA Lbr Alcides/2nd Weight Sex Delivery Anes PTL Lv   3 Para      CS-LTranv      2 Para      Vag-Spont      1 Para      Vag-Spont          Past Medical History  Past Medical History:   Diagnosis Date    DVT of lower extremity (deep venous thrombosis) (CMS/[facility])     RLE -  and     Fibroid     Hyperlipidemia     Hypertension     Iron deficiency anemia due to chronic blood loss     Long term current use of anticoagulant     warfarin -  follows with Dr. So Escamilla    Menorrhagia with regular cycle     Stroke (CMS/[facility]) 2019    ~ 6 weeks post partum, residual right sided weekness    Vision loss     glasses        Past Surgical History   Past Surgical History:   Procedure Laterality Date     SECTION, LOW TRANSVERSE      CT HEAD ANGIO W AND WO IV CONTRAST  10/08/2019    CT HEAD ANGIO W AND WO IV CONTRAST 10/8/2019 [facility]    LAPAROSCOPIC HYSTERECTOMY      RLTH, LSO , B salpingectomy    OOPHORECTOMY Left     w RTH    TONSILLECTOMY      UMBILICAL HERNIA REPAIR      WISDOM TOOTH EXTRACTION         Social History  Social History     Tobacco Use    Smoking status: Never    Smokeless tobacco: Never   Substance Use Topics    Alcohol use: Yes     Alcohol/week: 2.0 standard drinks of alcohol     Types: 2 Standard drinks or equivalent per week     Substance and Sexual Activity   Drug Use Never       Allergies  Penicillins     Medications  Medications Prior to Admission   Medication Sig Dispense Refill Last Dose    amLODIPine (Norvasc) 10 mg tablet Take 1 tablet (10  mg) by mouth once daily.       ascorbic acid (Vitamin C) 250 mg tablet Take 1 tablet (250 mg) by mouth once daily.       atorvastatin (Lipitor) 10 mg tablet Take 1 tablet (10 mg) by mouth once daily.       docusate sodium (Colace) 100 mg capsule Take 2 capsules (200 mg) by mouth 2 times a day. (Patient not taking: Reported on 2/1/2024) 30 capsule 0     ferrous sulfate 300 mg (60 mg iron)/5 mL syrup Take 5 mL (60 mg of iron) by mouth once daily.       hydroCHLOROthiazide (HYDRODiuril) 25 mg tablet Take 1 tablet (25 mg) by mouth once daily.       labetalol (Normodyne) 300 mg tablet Take 1 tablet (300 mg) by mouth 2 times a day.       multivitamin tablet Take 1 tablet by mouth once daily.       oxyCODONE-acetaminophen (Percocet) 5-325 mg tablet Take 1 tablet by mouth every 6 hours if needed for severe pain (7 - 10). 30 tablet 0     warfarin (Coumadin) 5 mg tablet Take 3 tablets (15 mg) by mouth once daily.          Objective    Last Vitals  Temp Pulse Resp BP MAP O2 Sat   36.2 °C (97.2 °F) 105 19 (!) 116/95   95 %     Physical Examination  Physical Exam  Constitutional:       General: She is not in acute distress.     Appearance: Normal appearance.   HENT:      Head: Normocephalic and atraumatic.      Nose: Nose normal.   Eyes:      General: No scleral icterus.     Extraocular Movements: Extraocular movements intact.   Cardiovascular:      Rate and Rhythm: Normal rate.   Pulmonary:      Effort: Pulmonary effort is normal.   Abdominal:      Palpations: Abdomen is soft.      Comments: Incision clean/dry/intact with dermabond, tympanic, mild diffuse tenderness, no rebound, no guarding   Musculoskeletal:         General: Normal range of motion.   Skin:     General: Skin is warm and dry.   Neurological:      Mental Status: She is alert.      Cranial Nerves: No cranial nerve deficit.   Psychiatric:         Mood and Affect: Mood normal.       Lab Review  Lab Results   Component Value Date    WBC 30.0 (H) 02/01/2024    HGB 8.1  (L) 02/01/2024    HCT 23.6 (L) 02/01/2024     02/01/2024     Lab Results   Component Value Date    GLUCOSE 119 (H) 02/01/2024     (L) 02/01/2024    K 4.6 02/01/2024     02/01/2024    CO2 22 02/01/2024    ANIONGAP 15 02/01/2024    BUN 23 02/01/2024    CREATININE 1.05 02/01/2024    EGFR 69 02/01/2024    CALCIUM 8.5 (L) 02/01/2024    ALBUMIN 3.3 (L) 02/01/2024     Lab Results   Component Value Date    APTT 21 (L) 02/01/2024    PROTIME 12.2 02/01/2024    INR 1.1 02/01/2024     Lab Results   Component Value Date    FIBRINOGEN 440 (H) 02/01/2024

## 2024-02-01 NOTE — CONSULTS
Obstetrical Consult    Reason for Consult: Postoperative Bleeding    Assessment/Plan    Post-operative Bleeding - s/p Uterine Artery Embolization - 7 days PO Robotic Hysterectomy (Kayce)    - Vitals stable post IR  -If continues to require multiple transfusions will need to discuss take back via laparoscopy/laparotomy to remove blood - surgery is complicated by history of umbilical hernia, recent surgery, and obesity.   -Will need to maintain on clear liquids until HGB levels stable  -Will need to maintain off anticoagulation until HGB levels stable and decision to not have to operate is clearly defined    Subjective   Patient 7 days PO presented to ED after passing out in the bathroom. CT Scan showed intra-abdominal bleed. HGB was less than 6. Transfused two units and sent to IR for uterine artery embolization.    Obstetrical History   OB History    Para Term  AB Living   3 3       3   SAB IAB Ectopic Multiple Live Births           3      # Outcome Date GA Lbr Alcides/2nd Weight Sex Delivery Anes PTL Lv   3 Para      CS-LTranv      2 Para      Vag-Spont      1 Para      Vag-Spont          Past Medical History  Past Medical History:   Diagnosis Date    DVT of lower extremity (deep venous thrombosis) (CMS/[facility])     RLE -  and     Fibroid     Hyperlipidemia     Hypertension     Iron deficiency anemia due to chronic blood loss     Long term current use of anticoagulant     warfarin -  follows with Dr. So Escamilla    Menorrhagia with regular cycle     Stroke (CMS/[facility]) 2019    ~ 6 weeks post partum, residual right sided weekness    Vision loss     glasses        Past Surgical History   Past Surgical History:   Procedure Laterality Date     SECTION, LOW TRANSVERSE      CT HEAD ANGIO W AND WO IV CONTRAST  10/08/2019    CT HEAD ANGIO W AND WO IV CONTRAST 10/8/2019 [facility]    LAPAROSCOPIC HYSTERECTOMY      RLTH, LSO , B salpingectomy    OOPHORECTOMY Left     w RTH    TONSILLECTOMY       UMBILICAL HERNIA REPAIR      WISDOM TOOTH EXTRACTION         Social History  Social History     Tobacco Use    Smoking status: Never    Smokeless tobacco: Never   Substance Use Topics    Alcohol use: Yes     Alcohol/week: 2.0 standard drinks of alcohol     Types: 2 Standard drinks or equivalent per week     Substance and Sexual Activity   Drug Use Never       Allergies  Penicillins     Medications  Medications Prior to Admission   Medication Sig Dispense Refill Last Dose    amLODIPine (Norvasc) 5 mg tablet Take 1 tablet (5 mg) by mouth once daily.       ascorbic acid (Vitamin C) 250 mg tablet TAKE 1 TABLET BY MOUTH EVERY DAY WITH IRON SUPPLEMENT       atorvastatin (Lipitor) 10 mg tablet Take 1 tablet (10 mg) by mouth once daily.       docusate sodium (Colace) 100 mg capsule Take 2 capsules (200 mg) by mouth 2 times a day. 30 capsule 0     ferrous sulfate 325 (65 Fe) MG tablet TAKE 2 TABLETS BY MOUTH EVERY DAY WITH VITAMIN-CAPSULE TABLET       hydroCHLOROthiazide (HYDRODiuril) 25 mg tablet Take 1 tablet (25 mg) by mouth once daily.       labetalol (Normodyne) 300 mg tablet Take 1 tablet (300 mg) by mouth 2 times a day.       multivitamin tablet Multivitamins TABS  Refills: 0       oxyCODONE-acetaminophen (Percocet) 5-325 mg tablet Take 1 tablet by mouth every 6 hours if needed for severe pain (7 - 10). 30 tablet 0     warfarin (Coumadin) 5 mg tablet STOPPED 1/13/24 per [facility] Dr. Biswas [facility]          Objective    Last Vitals  Temp Pulse Resp BP MAP O2 Sat   36.4 °C (97.5 °F) 107 18 117/78   100 % (RA)     Physical Examination  GENERAL: Examination reveals a well developed, well nourished and obese, gravid female in no acute distress. She is alert and cooperative.  LUNGS: normal effort  HEART: Regular rate  ABDOMEN: obese, soft, no rebound or guarding  EXTREMITIES: no redness or tenderness in the calves or thighs, no edema  SKIN: normal coloration and turgor, no rashes  NEUROLOGICAL: alert, oriented, normal speech,  no focal findings or movement disorder noted  PSYCHOLOGICAL: awake and alert; oriented to person, place, and time    Lab Review  Lab Results   Component Value Date    WBC 29.7 (H) 02/01/2024    HGB 6.6 (L) 02/01/2024    HCT 20.9 (L) 02/01/2024     02/01/2024     Lab Results   Component Value Date    APTT 44 (H) 01/31/2024    PROTIME 13.2 (H) 02/01/2024    INR 1.2 (H) 02/01/2024

## 2024-02-01 NOTE — PRE-PROCEDURE NOTE
Interventional Radiology Preprocedure Note    Indication for procedure: The primary encounter diagnosis was Symptomatic anemia. Diagnoses of Acute blood loss anemia, Syncope, non cardiac, and Elevated troponin were also pertinent to this visit. Presenting for pelvic angioembolization.    Relevant review of systems: NA    Relevant Labs:   Lab Results   Component Value Date    CREATININE 1.01 01/31/2024    EGFR 72 01/31/2024    INR 1.1 01/31/2024    PROTIME 12.3 01/31/2024    PREGTESTUR Negative 01/24/2024       Planned Sedation/Anesthesia: Moderate    Airway assessment: normal    Directed physical examination:    Awake and alert, oriented  Mild distress  Mild tachycardia  Breathing non-labored      Mallampati: III (soft and hard palate and base of uvula visible)    ASA Score: ASA 2 - Patient with mild systemic disease with no functional limitations    Benefits, risks and alternatives of procedure and planned sedation have been discussed with the patient and/or their representative. All questions answered and they agree to proceed.

## 2024-02-01 NOTE — PROGRESS NOTES
Patient signed out to me at by Jorge Ferrer MD . Please see previous note, HPI, physical exam and course of stay.      Katarina Merino   presented to Emergency Department  for   Chief Complaint   Patient presents with    Syncope   In brief I received signout by the previous provider please see the previous provider's full note including history, physical and course of stay.  Patient was admitted for symptomatic anemia, acute blood loss anemia, syncope and elevated troponin.  The plan is for patient to go to IR and straight to the ICU.  I did not have to provide any interventions for this patient during the remainder of her stay in the emergency department.        Madeleine Bueno, APRN-CNP

## 2024-02-02 ENCOUNTER — APPOINTMENT (OUTPATIENT)
Dept: RADIOLOGY | Facility: HOSPITAL | Age: 44
DRG: 371 | End: 2024-02-02
Payer: COMMERCIAL

## 2024-02-02 LAB
ALBUMIN SERPL BCP-MCNC: 2.9 G/DL (ref 3.4–5)
ANION GAP SERPL CALC-SCNC: 10 MMOL/L (ref 10–20)
APTT PPP: 17 SECONDS (ref 27–38)
ATRIAL RATE: 99 BPM
BLOOD EXPIRATION DATE: NORMAL
BUN SERPL-MCNC: 23 MG/DL (ref 6–23)
CA-I BLD-SCNC: 1.13 MMOL/L (ref 1.1–1.33)
CALCIUM SERPL-MCNC: 8 MG/DL (ref 8.6–10.6)
CFT BLD TEG: 1.2 MIN (ref 0.8–2.1)
CHLORIDE SERPL-SCNC: 105 MMOL/L (ref 98–107)
CLOT ANGLE BLD TEG: 74 DEG (ref 63–78)
CLOT INIT BLD TEG: 2 MIN (ref 4.6–9.1)
CLOT INIT P HPASE BLD TEG: 2 MIN (ref 4.3–8.3)
CO2 SERPL-SCNC: 25 MMOL/L (ref 21–32)
CREAT SERPL-MCNC: 0.94 MG/DL (ref 0.5–1.05)
DISPENSE STATUS: NORMAL
EGFRCR SERPLBLD CKD-EPI 2021: 78 ML/MIN/1.73M*2
ERYTHROCYTE [DISTWIDTH] IN BLOOD BY AUTOMATED COUNT: 16 % (ref 11.5–14.5)
ERYTHROCYTE [DISTWIDTH] IN BLOOD BY AUTOMATED COUNT: 16.3 % (ref 11.5–14.5)
ERYTHROCYTE [DISTWIDTH] IN BLOOD BY AUTOMATED COUNT: 16.5 % (ref 11.5–14.5)
ERYTHROCYTE [DISTWIDTH] IN BLOOD BY AUTOMATED COUNT: 17.2 % (ref 11.5–14.5)
ERYTHROCYTE [DISTWIDTH] IN BLOOD BY AUTOMATED COUNT: 17.6 % (ref 11.5–14.5)
FIBRINOGEN BLD CALC-MCNC: 356 MG/DL (ref 278–581)
GLUCOSE BLD MANUAL STRIP-MCNC: 104 MG/DL (ref 74–99)
GLUCOSE BLD MANUAL STRIP-MCNC: 109 MG/DL (ref 74–99)
GLUCOSE BLD MANUAL STRIP-MCNC: 125 MG/DL (ref 74–99)
GLUCOSE BLD MANUAL STRIP-MCNC: 134 MG/DL (ref 74–99)
GLUCOSE BLD MANUAL STRIP-MCNC: 135 MG/DL (ref 74–99)
GLUCOSE BLD MANUAL STRIP-MCNC: 93 MG/DL (ref 74–99)
GLUCOSE BLD MANUAL STRIP-MCNC: 97 MG/DL (ref 74–99)
GLUCOSE SERPL-MCNC: 126 MG/DL (ref 74–99)
HCT VFR BLD AUTO: 19.3 % (ref 36–46)
HCT VFR BLD AUTO: 19.8 % (ref 36–46)
HCT VFR BLD AUTO: 21.3 % (ref 36–46)
HCT VFR BLD AUTO: 22.4 % (ref 36–46)
HCT VFR BLD AUTO: 22.4 % (ref 36–46)
HGB BLD-MCNC: 6.7 G/DL (ref 12–16)
HGB BLD-MCNC: 7 G/DL (ref 12–16)
HGB BLD-MCNC: 7.3 G/DL (ref 12–16)
HGB BLD-MCNC: 7.5 G/DL (ref 12–16)
HGB BLD-MCNC: 7.8 G/DL (ref 12–16)
INR PPP: 1.1 (ref 0.9–1.1)
MAGNESIUM SERPL-MCNC: 2.06 MG/DL (ref 1.6–2.4)
MCF BLD TEG: 20 MM (ref 15–32)
MCF BLD TEG: 63 MM (ref 52–70)
MCF BLD TEG: 64 MM (ref 52–69)
MCH RBC QN AUTO: 26.3 PG (ref 26–34)
MCH RBC QN AUTO: 26.6 PG (ref 26–34)
MCH RBC QN AUTO: 27 PG (ref 26–34)
MCH RBC QN AUTO: 27.5 PG (ref 26–34)
MCH RBC QN AUTO: 27.6 PG (ref 26–34)
MCHC RBC AUTO-ENTMCNC: 32.6 G/DL (ref 32–36)
MCHC RBC AUTO-ENTMCNC: 34.7 G/DL (ref 32–36)
MCHC RBC AUTO-ENTMCNC: 34.8 G/DL (ref 32–36)
MCHC RBC AUTO-ENTMCNC: 35.2 G/DL (ref 32–36)
MCHC RBC AUTO-ENTMCNC: 35.4 G/DL (ref 32–36)
MCV RBC AUTO: 77 FL (ref 80–100)
MCV RBC AUTO: 78 FL (ref 80–100)
MCV RBC AUTO: 81 FL (ref 80–100)
NRBC BLD-RTO: 0.9 /100 WBCS (ref 0–0)
NRBC BLD-RTO: 1 /100 WBCS (ref 0–0)
NRBC BLD-RTO: 1 /100 WBCS (ref 0–0)
P AXIS: 39 DEGREES
P OFFSET: 204 MS
P ONSET: 146 MS
PHOSPHATE SERPL-MCNC: 3.1 MG/DL (ref 2.5–4.9)
PLATELET # BLD AUTO: 149 X10*3/UL (ref 150–450)
PLATELET # BLD AUTO: 155 X10*3/UL (ref 150–450)
PLATELET # BLD AUTO: 212 X10*3/UL (ref 150–450)
PLATELET # BLD AUTO: 213 X10*3/UL (ref 150–450)
PLATELET # BLD AUTO: 220 X10*3/UL (ref 150–450)
POTASSIUM SERPL-SCNC: 4.7 MMOL/L (ref 3.5–5.3)
PR INTERVAL: 144 MS
PRODUCT BLOOD TYPE: 5100
PRODUCT CODE: NORMAL
PROTHROMBIN TIME: 11.9 SECONDS (ref 9.8–12.8)
Q ONSET: 218 MS
QRS COUNT: 16 BEATS
QRS DURATION: 82 MS
QT INTERVAL: 320 MS
QTC CALCULATION(BAZETT): 410 MS
QTC FREDERICIA: 378 MS
R AXIS: 36 DEGREES
RBC # BLD AUTO: 2.52 X10*6/UL (ref 4–5.2)
RBC # BLD AUTO: 2.55 X10*6/UL (ref 4–5.2)
RBC # BLD AUTO: 2.72 X10*6/UL (ref 4–5.2)
RBC # BLD AUTO: 2.78 X10*6/UL (ref 4–5.2)
RBC # BLD AUTO: 2.89 X10*6/UL (ref 4–5.2)
SODIUM SERPL-SCNC: 135 MMOL/L (ref 136–145)
T AXIS: 11 DEGREES
T OFFSET: 378 MS
UNIT ABO: NORMAL
UNIT NUMBER: NORMAL
UNIT RH: NORMAL
UNIT VOLUME: 350
VENTRICULAR RATE: 99 BPM
WBC # BLD AUTO: 16.5 X10*3/UL (ref 4.4–11.3)
WBC # BLD AUTO: 19.3 X10*3/UL (ref 4.4–11.3)
WBC # BLD AUTO: 20.5 X10*3/UL (ref 4.4–11.3)
WBC # BLD AUTO: 23.2 X10*3/UL (ref 4.4–11.3)
WBC # BLD AUTO: 25.8 X10*3/UL (ref 4.4–11.3)
XM INTEP: NORMAL

## 2024-02-02 PROCEDURE — 99233 SBSQ HOSP IP/OBS HIGH 50: CPT | Performed by: STUDENT IN AN ORGANIZED HEALTH CARE EDUCATION/TRAINING PROGRAM

## 2024-02-02 PROCEDURE — P9016 RBC LEUKOCYTES REDUCED: HCPCS

## 2024-02-02 PROCEDURE — 36415 COLL VENOUS BLD VENIPUNCTURE: CPT | Performed by: NURSE PRACTITIONER

## 2024-02-02 PROCEDURE — 85027 COMPLETE CBC AUTOMATED: CPT | Performed by: NURSE PRACTITIONER

## 2024-02-02 PROCEDURE — 71045 X-RAY EXAM CHEST 1 VIEW: CPT | Performed by: RADIOLOGY

## 2024-02-02 PROCEDURE — 2500000001 HC RX 250 WO HCPCS SELF ADMINISTERED DRUGS (ALT 637 FOR MEDICARE OP): Performed by: NURSE PRACTITIONER

## 2024-02-02 PROCEDURE — 97161 PT EVAL LOW COMPLEX 20 MIN: CPT | Mod: GP

## 2024-02-02 PROCEDURE — C9113 INJ PANTOPRAZOLE SODIUM, VIA: HCPCS | Performed by: NURSE PRACTITIONER

## 2024-02-02 PROCEDURE — 2020000001 HC ICU ROOM DAILY

## 2024-02-02 PROCEDURE — 82947 ASSAY GLUCOSE BLOOD QUANT: CPT

## 2024-02-02 PROCEDURE — 99291 CRITICAL CARE FIRST HOUR: CPT | Performed by: NURSE PRACTITIONER

## 2024-02-02 PROCEDURE — 85384 FIBRINOGEN ACTIVITY: CPT

## 2024-02-02 PROCEDURE — 83735 ASSAY OF MAGNESIUM: CPT | Performed by: NURSE PRACTITIONER

## 2024-02-02 PROCEDURE — 99291 CRITICAL CARE FIRST HOUR: CPT | Performed by: STUDENT IN AN ORGANIZED HEALTH CARE EDUCATION/TRAINING PROGRAM

## 2024-02-02 PROCEDURE — 82330 ASSAY OF CALCIUM: CPT | Performed by: NURSE PRACTITIONER

## 2024-02-02 PROCEDURE — 85610 PROTHROMBIN TIME: CPT | Performed by: NURSE PRACTITIONER

## 2024-02-02 PROCEDURE — 84100 ASSAY OF PHOSPHORUS: CPT | Performed by: NURSE PRACTITIONER

## 2024-02-02 PROCEDURE — 36430 TRANSFUSION BLD/BLD COMPNT: CPT

## 2024-02-02 PROCEDURE — 2500000004 HC RX 250 GENERAL PHARMACY W/ HCPCS (ALT 636 FOR OP/ED): Performed by: NURSE PRACTITIONER

## 2024-02-02 PROCEDURE — 36600 WITHDRAWAL OF ARTERIAL BLOOD: CPT

## 2024-02-02 PROCEDURE — 71045 X-RAY EXAM CHEST 1 VIEW: CPT

## 2024-02-02 RX ORDER — OXYCODONE HYDROCHLORIDE 5 MG/1
10 TABLET ORAL EVERY 4 HOURS PRN
Status: DISCONTINUED | OUTPATIENT
Start: 2024-02-02 | End: 2024-02-04

## 2024-02-02 RX ORDER — AMOXICILLIN 250 MG
2 CAPSULE ORAL 2 TIMES DAILY
Status: DISCONTINUED | OUTPATIENT
Start: 2024-02-02 | End: 2024-02-04

## 2024-02-02 RX ORDER — OXYCODONE HYDROCHLORIDE 5 MG/1
5 TABLET ORAL EVERY 4 HOURS PRN
Status: DISCONTINUED | OUTPATIENT
Start: 2024-02-02 | End: 2024-02-04

## 2024-02-02 RX ORDER — HYDROMORPHONE HYDROCHLORIDE 1 MG/ML
0.4 INJECTION, SOLUTION INTRAMUSCULAR; INTRAVENOUS; SUBCUTANEOUS EVERY 4 HOURS PRN
Status: DISCONTINUED | OUTPATIENT
Start: 2024-02-02 | End: 2024-02-04

## 2024-02-02 RX ORDER — ACETAMINOPHEN 325 MG/1
650 TABLET ORAL EVERY 6 HOURS
Status: DISCONTINUED | OUTPATIENT
Start: 2024-02-02 | End: 2024-02-04

## 2024-02-02 RX ADMIN — ACETAMINOPHEN 650 MG: 325 TABLET ORAL at 22:08

## 2024-02-02 RX ADMIN — SENNOSIDES AND DOCUSATE SODIUM 2 TABLET: 8.6; 5 TABLET ORAL at 20:30

## 2024-02-02 RX ADMIN — SENNOSIDES AND DOCUSATE SODIUM 2 TABLET: 8.6; 5 TABLET ORAL at 08:24

## 2024-02-02 RX ADMIN — HYDROMORPHONE HYDROCHLORIDE 0.2 MG: 1 INJECTION, SOLUTION INTRAMUSCULAR; INTRAVENOUS; SUBCUTANEOUS at 13:53

## 2024-02-02 RX ADMIN — OXYCODONE HYDROCHLORIDE 10 MG: 5 TABLET ORAL at 20:30

## 2024-02-02 RX ADMIN — ACETAMINOPHEN 650 MG: 325 TABLET ORAL at 16:28

## 2024-02-02 RX ADMIN — METRONIDAZOLE 500 MG: 500 INJECTION, SOLUTION INTRAVENOUS at 10:38

## 2024-02-02 RX ADMIN — PANTOPRAZOLE SODIUM 40 MG: 40 INJECTION, POWDER, FOR SOLUTION INTRAVENOUS at 06:16

## 2024-02-02 RX ADMIN — METRONIDAZOLE 500 MG: 500 INJECTION, SOLUTION INTRAVENOUS at 02:25

## 2024-02-02 RX ADMIN — HYDROMORPHONE HYDROCHLORIDE 0.4 MG: 1 INJECTION, SOLUTION INTRAMUSCULAR; INTRAVENOUS; SUBCUTANEOUS at 08:21

## 2024-02-02 RX ADMIN — OXYCODONE HYDROCHLORIDE 5 MG: 5 TABLET ORAL at 16:28

## 2024-02-02 RX ADMIN — HYDROMORPHONE HYDROCHLORIDE 0.2 MG: 1 INJECTION, SOLUTION INTRAMUSCULAR; INTRAVENOUS; SUBCUTANEOUS at 02:33

## 2024-02-02 RX ADMIN — CIPROFLOXACIN 400 MG: 400 INJECTION, SOLUTION INTRAVENOUS at 09:12

## 2024-02-02 ASSESSMENT — PAIN - FUNCTIONAL ASSESSMENT
PAIN_FUNCTIONAL_ASSESSMENT: 0-10

## 2024-02-02 ASSESSMENT — PAIN SCALES - GENERAL
PAINLEVEL_OUTOF10: 5 - MODERATE PAIN
PAINLEVEL_OUTOF10: 6
PAINLEVEL_OUTOF10: 0 - NO PAIN
PAINLEVEL_OUTOF10: 7
PAINLEVEL_OUTOF10: 0 - NO PAIN
PAINLEVEL_OUTOF10: 6
PAINLEVEL_OUTOF10: 0 - NO PAIN

## 2024-02-02 ASSESSMENT — ENCOUNTER SYMPTOMS
PSYCHIATRIC NEGATIVE: 1
ENDOCRINE NEGATIVE: 1
ABDOMINAL PAIN: 1
RESPIRATORY NEGATIVE: 1
EYES NEGATIVE: 1
MUSCULOSKELETAL NEGATIVE: 1
FATIGUE: 1
NEUROLOGICAL NEGATIVE: 1
ALLERGIC/IMMUNOLOGIC NEGATIVE: 1
CARDIOVASCULAR NEGATIVE: 1

## 2024-02-02 ASSESSMENT — COGNITIVE AND FUNCTIONAL STATUS - GENERAL
DRESSING REGULAR LOWER BODY CLOTHING: A LITTLE
EATING MEALS: A LITTLE
TOILETING: A LITTLE
TURNING FROM BACK TO SIDE WHILE IN FLAT BAD: A LOT
WALKING IN HOSPITAL ROOM: A LITTLE
STANDING UP FROM CHAIR USING ARMS: A LITTLE
DRESSING REGULAR UPPER BODY CLOTHING: A LITTLE
MOVING FROM LYING ON BACK TO SITTING ON SIDE OF FLAT BED WITH BEDRAILS: A LITTLE
MOVING TO AND FROM BED TO CHAIR: A LITTLE
MOBILITY SCORE: 16
TURNING FROM BACK TO SIDE WHILE IN FLAT BAD: A LITTLE
CLIMB 3 TO 5 STEPS WITH RAILING: A LITTLE
MOBILITY SCORE: 18
STANDING UP FROM CHAIR USING ARMS: A LITTLE
MOVING TO AND FROM BED TO CHAIR: A LITTLE
CLIMB 3 TO 5 STEPS WITH RAILING: A LOT
MOVING FROM LYING ON BACK TO SITTING ON SIDE OF FLAT BED WITH BEDRAILS: A LITTLE
WALKING IN HOSPITAL ROOM: A LITTLE
PERSONAL GROOMING: A LITTLE
HELP NEEDED FOR BATHING: A LITTLE
DAILY ACTIVITIY SCORE: 18

## 2024-02-02 ASSESSMENT — PAIN DESCRIPTION - LOCATION: LOCATION: ABDOMEN

## 2024-02-02 NOTE — PROGRESS NOTES
Physical Therapy    Physical Therapy Evaluation    Patient Name: Katarina Merino  MRN: [id number]  Today's Date: 2/2/2024   Time Calculation  Start Time: 1103  Stop Time: 1125  Time Calculation (min): 22 min    Assessment/Plan   PT Assessment  PT Assessment Results: Decreased strength, Decreased endurance, Decreased mobility, Pain  Rehab Prognosis: Excellent  Evaluation/Treatment Tolerance: Patient limited by pain, Patient limited by fatigue  Medical Staff Made Aware: Yes  Strengths: Attitude of self, Housing layout, Premorbid level of function, Support of Caregivers  End of Session Communication: Bedside nurse  Assessment Comment: Pt primarily limited by abdominal pain and generalized weakness/fatigue during PT evaluation. Pt will benefit from continued skilled PT to address above deficits and facilitate return to PLOF.  End of Session Patient Position: Bed, 3 rail up  IP OR SWING BED PT PLAN  Inpatient or Swing Bed: Inpatient  PT Plan  Treatment/Interventions: Bed mobility, Gait training, Transfer training, Stair training, Balance training, Endurance training, Strengthening, Therapeutic exercise, Therapeutic activity  PT Plan: Skilled PT  PT Frequency: 4 times per week  PT Discharge Recommendations: Low intensity level of continued care  PT Recommended Transfer Status: Assist x1  PT - OK to Discharge: Yes      Subjective   General Visit Information:  Reason for Referral: transferred to [facility] ICU in the s/o 17 cm retroperitoneal hematoma s/p bilateral UAE with concern for continued intraabdominal bleeding  Past Medical History Relevant to Rehab: 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 s/p robotic total hysterectomy, LSO, right salpingectomy for AUB-L  Prior to Session Communication: Bedside nurse, Physician (cleared for EOB mobility on this date)  Patient Position Received: Bed, 3 rail up  Family/Caregiver Present: No  General Comment: Pt pleasant and agreeable, motivated for PT evaluation   Home Living:  Home Living  Type of Home:  House  Lives With: Dependent children, Significant other (14, 11, and 4 year old)  Home Layout: Two level, Bed/bath upstairs, Full bath main level (15 steps to 2nd floor)  Home Access: Stairs to enter with rails  Entrance Stairs-Number of Steps: 2  Bathroom Shower/Tub: Tub/shower unit  Prior Level of Function:  Prior Function Per Pt/Caregiver Report  Level of [place]: Independent with ADLs and functional transfers, Independent with homemaking with ambulation  Vocational: Full time employment ([employer] )  Precautions:  Precautions  Medical Precautions: Abdominal precautions  Vital Signs:  Vital Signs  Heart Rate: 95 (POST; 103)  Heart Rate Source: Monitor  Resp: 18 (POST 22)  SpO2: 95 %  BP: 122/74 (POST: 141/100)  MAP (mmHg): 112  BP Location: Right arm  BP Method: Automatic    Objective   Lines/Tubes/Drains:  Urethral Catheter Double-lumen 16 Fr. (Active)   Number of days: 1     Continuous Medications/Drips:  lactated Ringer's, 100 mL/hr, Last Rate: 100 mL/hr (02/01/24 1604)      Oxygen:    Pain:  Pain Assessment  Pain Assessment: 0-10  Pain Score: 5 - Moderate pain  Pain Type: Acute pain  Pain Location: Abdomen  Pain Interventions: Medication (See MAR)  Cognition:  Cognition  Overall Cognitive Status: Within Functional Limits      Extremity/Trunk Assessments:  Strength:     RUE   RUE : Exceptions to WFL (RUE arm weakness and fatigue - pt reports arm feeling heavy)  LUE   LUE: Within Functional Limits  RLE   RLE : Within Functional Limits  LLE   LLE : Within Functional Limits    General Assessments:         Activity Tolerance  Endurance: Tolerates 10 - 20 min exercise with multiple rests  Sensation  Light Touch: No apparent deficits  Coordination  Movements are Fluid and Coordinated: Yes  Static Sitting Balance  Static Sitting-Balance Support: Feet supported  Static Sitting-Level of Assistance: Contact guard  Static Sitting-Comment/Number of Minutes: 10       Functional Assessments:  Bed  Mobility  Bed Mobility: Yes  Bed Mobility 1  Bed Mobility 1: Supine to sitting, Sitting to supine  Level of Assistance 1: Moderate assistance  Bed Mobility Comments 1: log roll sequencing, HOB elevated  Bed Mobility 2  Bed Mobility  2: Scooting  Level of Assistance 2: Contact guard  Bed Mobility Comments 2: towards HOB while sitting EOB  Transfers  Transfer: No (per NP EOB only allowed on this date (2/2))             Outcome Measures:  [facility] Basic Mobility  Turning from your back to your side while in a flat bed without using bedrails: A little  Moving from lying on your back to sitting on the side of a flat bed without using bedrails: A lot  Moving to and from bed to chair (including a wheelchair): A little  Standing up from a chair using your arms (e.g. wheelchair or bedside chair): A little  To walk in hospital room: A little  Climbing 3-5 steps with railing: A lot  Basic Mobility - Total Score: 16           FSS-ICU  Ambulation: Unable to attempt due to weakness  Rolling: Minimal assistance (performs 75% or more of task)  Sitting: Supervision or set-up only  Transfer Sit-to-Stand: Moderate assistance (performs 50 - 74% of task)  Transfer Supine-to-Sit: Moderate assistance (performs 50 - 74% of task)  Total Score: 15                   Encounter Problems       Encounter Problems (Active)       Balance       Pt will tolerate dynamic standing balance activities independently        Start:  02/02/24    Expected End:  02/16/24               Mobility       STG - Patient will ambulate >100ft using LRAD Abhi       Start:  02/02/24    Expected End:  02/16/24            STG - Patient will ascend and descend 2 steps per home entry set-up with SBA        Start:  02/02/24    Expected End:  02/16/24               Pain          Safety       LTG - Patient will adhere to hip precautions during ADL's and transfers       Start:  02/01/24            LTG - Patient will demonstrate safety requirements appropriate to  situation/environment       Start:  02/01/24            LTG - Patient will utilize safety techniques       Start:  02/01/24            STG - Patient locks brakes on wheelchair       Start:  02/01/24            STG - Patient uses call light consistently to request assistance with transfers       Start:  02/01/24            STG - Patient uses gait belt during all transfers       Start:  02/01/24            Goal 1       Start:  02/01/24            Goal 2       Start:  02/01/24            Goal 3       Start:  02/01/24               Transfers       Pt will complete all functional transfers independently        Start:  02/02/24    Expected End:  02/16/24                   Education Documentation  No documentation found.  Education Comments  No comments found.            02/02/24 at 12:45 PM   [place] PT   Rehab Office: [phone]

## 2024-02-02 NOTE — PROGRESS NOTES
"Katarina Merino is a 43 y.o. female on day 1 of admission presenting with Hemorrhagic shock (CMS/HCC).    Subjective   Additional 2 rbc this am  Hemodynamics remain intact       Objective     Physical Exam  Vitals and nursing note reviewed.   Constitutional:       Appearance: She is obese. She is ill-appearing.   HENT:      Head: Normocephalic.      Nose: Nose normal.      Mouth/Throat:      Mouth: Mucous membranes are dry.   Eyes:      Pupils: Pupils are equal, round, and reactive to light.   Cardiovascular:      Rate and Rhythm: Normal rate and regular rhythm.      Pulses: Normal pulses.   Pulmonary:      Effort: Pulmonary effort is normal.      Breath sounds: Examination of the right-lower field reveals decreased breath sounds. Examination of the left-lower field reveals decreased breath sounds. Decreased breath sounds present.      Comments: Room air  Abdominal:      General: Abdomen is protuberant.      Palpations: Abdomen is soft.      Tenderness: There is abdominal tenderness in the right lower quadrant and left lower quadrant. There is guarding.      Comments: Still endorses feelings of fullness   Genitourinary:     Comments: Hernandez with clear yellow urine  Musculoskeletal:      Cervical back: Normal range of motion.   Skin:     General: Skin is warm and dry.      Capillary Refill: Capillary refill takes less than 2 seconds.   Neurological:      General: No focal deficit present.      Mental Status: She is alert and oriented to person, place, and time.   Psychiatric:         Mood and Affect: Mood normal.         Behavior: Behavior normal.         Last Recorded Vitals  Blood pressure 113/63, pulse 85, temperature 36.2 °C (97.2 °F), temperature source Temporal, resp. rate 23, height 1.727 m (5' 8\"), weight 146 kg (322 lb 11.2 oz), last menstrual period 01/07/2024, SpO2 100 %, not currently breastfeeding.    Heart Rate:  []   Temp:  [35.5 °C (95.9 °F)-36.8 °C (98.2 °F)]   Resp:  [15-25]   BP: " ()/(61-88)   Weight:  [146 kg (322 lb 11.2 oz)]   SpO2:  [92 %-100 %]    Intake/Output last 3 Shifts:  I/O last 3 completed shifts:  In: 1700 (11.6 mL/kg) [I.V.:1600 (10.9 mL/kg); IV Piggyback:100]  Out: 825 (5.6 mL/kg) [Urine:825 (0.2 mL/kg/hr)]  Weight: 146.4 kg       Intake/Output Summary (Last 24 hours) at 2/2/2024 1819  Last data filed at 2/2/2024 1800  Gross per 24 hour   Intake 4093.34 ml   Output 1485 ml   Net 2608.34 ml          Relevant Results  Scheduled medications  acetaminophen, 650 mg, oral, q6h  insulin lispro, 0-5 Units, subcutaneous, q4h  sennosides-docusate sodium, 2 tablet, oral, BID      Continuous medications  lactated Ringer's, 50 mL/hr, Last Rate: 50 mL/hr (02/02/24 1634)      PRN medications  PRN medications: calcium gluconate, calcium gluconate, dextrose 10 % in water (D10W), dextrose, glucagon, HYDROmorphone, magnesium sulfate, magnesium sulfate, oxyCODONE, oxyCODONE, potassium chloride     Results for orders placed or performed during the hospital encounter of 02/01/24 (from the past 24 hour(s))   POCT GLUCOSE   Result Value Ref Range    POCT Glucose 144 (H) 74 - 99 mg/dL   CBC   Result Value Ref Range    WBC 28.4 (H) 4.4 - 11.3 x10*3/uL    nRBC 1.0 (H) 0.0 - 0.0 /100 WBCs    RBC 2.70 (L) 4.00 - 5.20 x10*6/uL    Hemoglobin 7.0 (L) 12.0 - 16.0 g/dL    Hematocrit 21.0 (L) 36.0 - 46.0 %    MCV 78 (L) 80 - 100 fL    MCH 25.9 (L) 26.0 - 34.0 pg    MCHC 33.3 32.0 - 36.0 g/dL    RDW 17.6 (H) 11.5 - 14.5 %    Platelets 115 (L) 150 - 450 x10*3/uL   POCT GLUCOSE   Result Value Ref Range    POCT Glucose 134 (H) 74 - 99 mg/dL   CBC   Result Value Ref Range    WBC 25.8 (H) 4.4 - 11.3 x10*3/uL    nRBC 1.0 (H) 0.0 - 0.0 /100 WBCs    RBC 2.52 (L) 4.00 - 5.20 x10*6/uL    Hemoglobin 6.7 (L) 12.0 - 16.0 g/dL    Hematocrit 19.3 (L) 36.0 - 46.0 %    MCV 77 (L) 80 - 100 fL    MCH 26.6 26.0 - 34.0 pg    MCHC 34.7 32.0 - 36.0 g/dL    RDW 17.6 (H) 11.5 - 14.5 %    Platelets 149 (L) 150 - 450 x10*3/uL    Prepare RBC: 1 Units   Result Value Ref Range    PRODUCT CODE [id number]     Unit Number [id number]     Unit ABO O     Unit RH POS     XM INTEP COMP     Dispense Status IS     Blood Expiration Date February 16, 2024 23:59 EST     PRODUCT BLOOD TYPE 5100     UNIT VOLUME 350    POCT GLUCOSE   Result Value Ref Range    POCT Glucose 134 (H) 74 - 99 mg/dL   Calcium, Ionized   Result Value Ref Range    POCT Calcium, Ionized 1.13 1.1 - 1.33 mmol/L   CBC   Result Value Ref Range    WBC 23.2 (H) 4.4 - 11.3 x10*3/uL    nRBC 0.9 (H) 0.0 - 0.0 /100 WBCs    RBC 2.78 (L) 4.00 - 5.20 x10*6/uL    Hemoglobin 7.3 (L) 12.0 - 16.0 g/dL    Hematocrit 22.4 (L) 36.0 - 46.0 %    MCV 81 80 - 100 fL    MCH 26.3 26.0 - 34.0 pg    MCHC 32.6 32.0 - 36.0 g/dL    RDW 17.2 (H) 11.5 - 14.5 %    Platelets 212 150 - 450 x10*3/uL   Coagulation Screen   Result Value Ref Range    Protime 11.9 9.8 - 12.8 seconds    INR 1.1 0.9 - 1.1    aPTT 17 (L) 27 - 38 seconds   Magnesium   Result Value Ref Range    Magnesium 2.06 1.60 - 2.40 mg/dL   Renal Function Panel   Result Value Ref Range    Glucose 126 (H) 74 - 99 mg/dL    Sodium 135 (L) 136 - 145 mmol/L    Potassium 4.7 3.5 - 5.3 mmol/L    Chloride 105 98 - 107 mmol/L    Bicarbonate 25 21 - 32 mmol/L    Anion Gap 10 10 - 20 mmol/L    Urea Nitrogen 23 6 - 23 mg/dL    Creatinine 0.94 0.50 - 1.05 mg/dL    eGFR 78 >60 mL/min/1.73m*2    Calcium 8.0 (L) 8.6 - 10.6 mg/dL    Phosphorus 3.1 2.5 - 4.9 mg/dL    Albumin 2.9 (L) 3.4 - 5.0 g/dL   Prepare RBC: 1 Units   Result Value Ref Range    PRODUCT CODE [id number]     Unit Number [id number]     Unit ABO O     Unit RH POS     XM INTEP COMP     Dispense Status TR     Blood Expiration Date February 22, 2024 23:59 EST     PRODUCT BLOOD TYPE 5100     UNIT VOLUME 350    POCT GLUCOSE   Result Value Ref Range    POCT Glucose 125 (H) 74 - 99 mg/dL   TEG Clot Global Profile   Result Value Ref Range    R (Reaction Time) K 2.0 (L) 4.6 - 9.1 min    K (Clot Kinetics) 1.2 0.8  - 2.1 min    ANGLE 74.0 63.0 - 78.0 deg    MA (Max Amplitude) K 64.0 52.0 - 69.0 mm    R (Reaction Time) KH 2.0 (L) 4.3 - 8.3 min    MA (Max Amplitude) RT 63.0 52.0 - 70.0 mm    MA ( Terrence Amplitude) FF 20.0 15.0 - 32.0 mm    FLEV 356 278 - 581 mg/dL   CBC   Result Value Ref Range    WBC 20.5 (H) 4.4 - 11.3 x10*3/uL    nRBC 0.9 (H) 0.0 - 0.0 /100 WBCs    RBC 2.89 (L) 4.00 - 5.20 x10*6/uL    Hemoglobin 7.8 (L) 12.0 - 16.0 g/dL    Hematocrit 22.4 (L) 36.0 - 46.0 %    MCV 78 (L) 80 - 100 fL    MCH 27.0 26.0 - 34.0 pg    MCHC 34.8 32.0 - 36.0 g/dL    RDW 16.0 (H) 11.5 - 14.5 %    Platelets 220 150 - 450 x10*3/uL   POCT GLUCOSE   Result Value Ref Range    POCT Glucose 109 (H) 74 - 99 mg/dL   POCT GLUCOSE   Result Value Ref Range    POCT Glucose 93 74 - 99 mg/dL   CBC   Result Value Ref Range    WBC 19.3 (H) 4.4 - 11.3 x10*3/uL    nRBC 1.0 (H) 0.0 - 0.0 /100 WBCs    RBC 2.72 (L) 4.00 - 5.20 x10*6/uL    Hemoglobin 7.5 (L) 12.0 - 16.0 g/dL    Hematocrit 21.3 (L) 36.0 - 46.0 %    MCV 78 (L) 80 - 100 fL    MCH 27.6 26.0 - 34.0 pg    MCHC 35.2 32.0 - 36.0 g/dL    RDW 16.3 (H) 11.5 - 14.5 %    Platelets 213 150 - 450 x10*3/uL                               Assessment/Plan   Patient presents from OSH to [facility] SICU for further management of hemorrhagic shock.     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  Patient is s/p robotic total hysterectomy, LSO, right salpingectomy on 1/24/24 with Dr. Naidu.  In the ED patient was found to be significantly anemic at 5.7 (baseline 11).  She was started on transfusion of 2 units PRBCs as well as 2L IV NS.  PT 26.7, INR 2.3.  Patient was given Kcentra and vitamin K.  Leukocytosis of 24.  She was given Cipro, Flagyl, and vancomycin for antibiotic coverage.  CT chest  abdomen pelvis remarkable for a heterogenous lobulated collection extending from the pelvis to the right retroperitoneum measuring approximately 17 cm concerning for active extravasation.  There is also a small right intramuscular transverse abdominal hematoma measuring 1 cm.  Interventional radiology was consulted and patient was taken for urgent pelvic angiogram with bilateral uterine artery Gelfoam embolization .  She was admitted to the intensive care unit for further hemodynamic monitoring.  Since admission to the ICU patient has been hemodynamically stable, no pressor requirements.  Repeat a.m. hemoglobin 6.6, patient was transfused an additional 2 unit of PRBCs.  Gynecology consulted who recommends transfer to [facility] for possible surgical intervention.      Neuro: History of hemorrhagic CVA in 2019 without deficits. Syncopal episode on 1/31 at house, found by . Currently A&O x3. Acute pain.  - ongoing neuro and pain assessments  - prn hydromorphone -> convert to oral meds  - PT/OT   - home meds: oxycodone-acetaminophen 5-325mg q6h prn     CV: History of HTN and HLD. Baseline -130/70-80s per chart review. No TTE on file. High sensitivity troponin 74, 63 at OSH. Lactate WNL. Arrived to SICU hemodynamically intact without vasoactive support. Tachycardia improved after IVF bolus and RBC transfusions.  - goal map range 65-85  - volume/products as indicated   - consider TTE if becomes clinically unstable  - hold antihypertensives  - home meds: Norvasc 5mg, Lipitor 10mg, Labetalol 300mg BID, hydrochlorothiazide 25mg     PULM: No history of pulmonary disease. No active distress. Arrived to SICU on room air  - cxr prn daily  - goal SpO2 > 92%   - IS hourly while awake     GI: No history of GI disease. Acute abdominal pain since recent surgery. Elevated ALT, downtrended this am. Last BM 3 days ago  - NPO except for meds  - PPI ppx  - serial abdominal exams  - bowel regimen     GYN: History of  Endometriosis and Leiomyoma. Recent ANTHONY-BSO 1/24/24. CT C/A/P showing heterogenous lobulated collection extending from pelvis to right retroperitoneum measuring 17cm likely related to recent hysterectomy. Probable small right intramuscular transverse abdominis hematoma measuring 1cm  -await OB/Gyn recs for today -> no OR planned, continue to watch clinically and trend cbc q6h     : No history of renal disease. Baseline creatinine 0.93. Hyponatremia resolved. Borderline hyperkalemia resolved. Rose cath inserted 2/1/24  - RFP daily and prn  - hourly I&Os  - continue rose cath  - replete electrolytes judiciously      HEME: Baseline H/H 11/37. History of Iron Deficiency Anemia. History of right leg DVT x2 (on Coumadin). Coumadin has been stopped since 1/13/24, Lovenox injections until her procedure. Restarted Coumadin on 1/27. Hemoglobin 5.7 on admit to ED s/p 2 units pRBC with appropriate increment to 7.4. INR therapeutic on admission, decreased to 1.1 after Kcentra and Vitamin K. IR consulted, patient taken for pelvic angioembolization s/p bilateral uterine artery Gelfoam embolization overnight 1/31-2/1. Hgb drop to 6.6 this am, transfused additional 2 RBC prior to transfer to [facility]. Received additional 2 RBC overnight for Hgb drop to 6.7.  - obtain cbc q6h  - TEG results without intervention indicated  - coags and fibrinogen daily  - keep T&S active (2/1)  - Transfuse if hgb < 7   - scds, hold subcutaneous heparin   - consult vasc med after H/H stabilizes for future AC plan  - home meds: MVI, ascorbic acid, ferrous sulfate, warfarin      ID: Afebrile. Leukocytosis improving. Blood cultures x2 1/31 NGTD. Started on Cipro, Vanc, Flagyl 1/31. UA negative 2/1. MRSA screen negative 2/1.  - discontinue cipro and flagyl   - follow up cultures   - ongoing infection surveillance     Endocrine: No history of endocrinopathies. Adequate glycemic control  -Blood glucose goal <180  - SSI #1  - q4h BG checks     Lines, Tubes,  and Drains:   Hernandez Catheter (2/1/24)    PIV x2 (20g)     Code Status: Full code     Family:  and sister in-law updated at bedside today     Disposition: Continue ICU care for now. Anticipate RNF within the next 24 - 48 hr pending stable hgb.          Critical Care Time: 45 min       Casa Leiva MD

## 2024-02-02 NOTE — PROGRESS NOTES
"Katarina Merino is a 43 y.o. female on day 1 of admission presenting with Hemorrhagic shock (CMS/HCC).    Subjective   Additional 2 rbc this am  Hemodynamics remain intact       Objective     Physical Exam  Vitals and nursing note reviewed.   Constitutional:       Appearance: She is obese. She is ill-appearing.   HENT:      Head: Normocephalic.      Nose: Nose normal.      Mouth/Throat:      Mouth: Mucous membranes are dry.   Eyes:      Pupils: Pupils are equal, round, and reactive to light.   Cardiovascular:      Rate and Rhythm: Normal rate and regular rhythm.      Pulses: Normal pulses.   Pulmonary:      Effort: Pulmonary effort is normal.      Breath sounds: Examination of the right-lower field reveals decreased breath sounds. Examination of the left-lower field reveals decreased breath sounds. Decreased breath sounds present.      Comments: Room air  Abdominal:      General: Abdomen is protuberant.      Palpations: Abdomen is soft.      Tenderness: There is abdominal tenderness in the right lower quadrant and left lower quadrant. There is guarding.      Comments: Still endorses feelings of fullness   Genitourinary:     Comments: Hernandez with clear yellow urine  Musculoskeletal:      Cervical back: Normal range of motion.   Skin:     General: Skin is warm and dry.      Capillary Refill: Capillary refill takes less than 2 seconds.   Neurological:      General: No focal deficit present.      Mental Status: She is alert and oriented to person, place, and time.   Psychiatric:         Mood and Affect: Mood normal.         Behavior: Behavior normal.         Last Recorded Vitals  Blood pressure 100/66, pulse 92, temperature 36.4 °C (97.5 °F), temperature source Temporal, resp. rate 19, height 1.727 m (5' 8\"), weight 146 kg (322 lb 11.2 oz), last menstrual period 01/07/2024, SpO2 92 %, not currently breastfeeding.    Heart Rate:  []   Temp:  [35.5 °C (95.9 °F)-36.8 °C (98.2 °F)]   Resp:  [15-26]   BP: " "(100-133)/(61-98)   Height:  [172.7 cm (5' 8\")]   Weight:  [142 kg (313 lb 6.4 oz)-146 kg (322 lb 11.2 oz)]   SpO2:  [92 %-100 %]    Intake/Output last 3 Shifts:  I/O last 3 completed shifts:  In: 1700 (11.6 mL/kg) [I.V.:1600 (10.9 mL/kg); IV Piggyback:100]  Out: 825 (5.6 mL/kg) [Urine:825 (0.2 mL/kg/hr)]  Weight: 146.4 kg       Intake/Output Summary (Last 24 hours) at 2/2/2024 0748  Last data filed at 2/2/2024 0600  Gross per 24 hour   Intake 1700 ml   Output 825 ml   Net 875 ml        Relevant Results  Scheduled medications  ciprofloxacin, 400 mg, intravenous, q12h  insulin lispro, 0-5 Units, subcutaneous, q4h  metroNIDAZOLE, 500 mg, intravenous, q8h  pantoprazole, 40 mg, intravenous, Daily before breakfast      Continuous medications  lactated Ringer's, 100 mL/hr, Last Rate: 100 mL/hr (02/01/24 1604)      PRN medications  PRN medications: calcium gluconate, calcium gluconate, dextrose 10 % in water (D10W), dextrose, glucagon, HYDROmorphone, HYDROmorphone, magnesium sulfate, magnesium sulfate, potassium chloride     Results for orders placed or performed during the hospital encounter of 02/01/24 (from the past 24 hour(s))   Type and screen   Result Value Ref Range    ABO TYPE O     Rh TYPE POS     ANTIBODY SCREEN NEG    CBC   Result Value Ref Range    WBC 30.0 (H) 4.4 - 11.3 x10*3/uL    nRBC 0.8 (H) 0.0 - 0.0 /100 WBCs    RBC 3.12 (L) 4.00 - 5.20 x10*6/uL    Hemoglobin 8.1 (L) 12.0 - 16.0 g/dL    Hematocrit 23.6 (L) 36.0 - 46.0 %    MCV 76 (L) 80 - 100 fL    MCH 26.0 26.0 - 34.0 pg    MCHC 34.3 32.0 - 36.0 g/dL    RDW 17.5 (H) 11.5 - 14.5 %    Platelets 249 150 - 450 x10*3/uL   Coagulation Screen   Result Value Ref Range    Protime 12.2 9.8 - 12.8 seconds    INR 1.1 0.9 - 1.1    aPTT 21 (L) 27 - 38 seconds   Fibrinogen   Result Value Ref Range    Fibrinogen 440 (H) 200 - 400 mg/dL   Magnesium   Result Value Ref Range    Magnesium 2.07 1.60 - 2.40 mg/dL   Renal Function Panel   Result Value Ref Range    Glucose 119 " (H) 74 - 99 mg/dL    Sodium 135 (L) 136 - 145 mmol/L    Potassium 4.6 3.5 - 5.3 mmol/L    Chloride 103 98 - 107 mmol/L    Bicarbonate 22 21 - 32 mmol/L    Anion Gap 15 10 - 20 mmol/L    Urea Nitrogen 23 6 - 23 mg/dL    Creatinine 1.05 0.50 - 1.05 mg/dL    eGFR 69 >60 mL/min/1.73m*2    Calcium 8.5 (L) 8.6 - 10.6 mg/dL    Phosphorus 3.9 2.5 - 4.9 mg/dL    Albumin 3.3 (L) 3.4 - 5.0 g/dL   POCT GLUCOSE   Result Value Ref Range    POCT Glucose 144 (H) 74 - 99 mg/dL   CBC   Result Value Ref Range    WBC 28.4 (H) 4.4 - 11.3 x10*3/uL    nRBC 1.0 (H) 0.0 - 0.0 /100 WBCs    RBC 2.70 (L) 4.00 - 5.20 x10*6/uL    Hemoglobin 7.0 (L) 12.0 - 16.0 g/dL    Hematocrit 21.0 (L) 36.0 - 46.0 %    MCV 78 (L) 80 - 100 fL    MCH 25.9 (L) 26.0 - 34.0 pg    MCHC 33.3 32.0 - 36.0 g/dL    RDW 17.6 (H) 11.5 - 14.5 %    Platelets 115 (L) 150 - 450 x10*3/uL   POCT GLUCOSE   Result Value Ref Range    POCT Glucose 134 (H) 74 - 99 mg/dL   CBC   Result Value Ref Range    WBC 25.8 (H) 4.4 - 11.3 x10*3/uL    nRBC 1.0 (H) 0.0 - 0.0 /100 WBCs    RBC 2.52 (L) 4.00 - 5.20 x10*6/uL    Hemoglobin 6.7 (L) 12.0 - 16.0 g/dL    Hematocrit 19.3 (L) 36.0 - 46.0 %    MCV 77 (L) 80 - 100 fL    MCH 26.6 26.0 - 34.0 pg    MCHC 34.7 32.0 - 36.0 g/dL    RDW 17.6 (H) 11.5 - 14.5 %    Platelets 149 (L) 150 - 450 x10*3/uL   Prepare RBC: 1 Units   Result Value Ref Range    PRODUCT CODE Q8149X36     Unit Number [id number]     Unit ABO O     Unit RH POS     XM INTEP COMP     Dispense Status IS     Blood Expiration Date February 16, 2024 23:59 EST     PRODUCT BLOOD TYPE 5100     UNIT VOLUME 350    POCT GLUCOSE   Result Value Ref Range    POCT Glucose 134 (H) 74 - 99 mg/dL   Calcium, Ionized   Result Value Ref Range    POCT Calcium, Ionized 1.13 1.1 - 1.33 mmol/L   CBC   Result Value Ref Range    WBC 23.2 (H) 4.4 - 11.3 x10*3/uL    nRBC 0.9 (H) 0.0 - 0.0 /100 WBCs    RBC 2.78 (L) 4.00 - 5.20 x10*6/uL    Hemoglobin 7.3 (L) 12.0 - 16.0 g/dL    Hematocrit 22.4 (L) 36.0 - 46.0  %    MCV 81 80 - 100 fL    MCH 26.3 26.0 - 34.0 pg    MCHC 32.6 32.0 - 36.0 g/dL    RDW 17.2 (H) 11.5 - 14.5 %    Platelets 212 150 - 450 x10*3/uL   Coagulation Screen   Result Value Ref Range    Protime 11.9 9.8 - 12.8 seconds    INR 1.1 0.9 - 1.1    aPTT 17 (L) 27 - 38 seconds   Magnesium   Result Value Ref Range    Magnesium 2.06 1.60 - 2.40 mg/dL   Renal Function Panel   Result Value Ref Range    Glucose 126 (H) 74 - 99 mg/dL    Sodium 135 (L) 136 - 145 mmol/L    Potassium 4.7 3.5 - 5.3 mmol/L    Chloride 105 98 - 107 mmol/L    Bicarbonate 25 21 - 32 mmol/L    Anion Gap 10 10 - 20 mmol/L    Urea Nitrogen 23 6 - 23 mg/dL    Creatinine 0.94 0.50 - 1.05 mg/dL    eGFR 78 >60 mL/min/1.73m*2    Calcium 8.0 (L) 8.6 - 10.6 mg/dL    Phosphorus 3.1 2.5 - 4.9 mg/dL    Albumin 2.9 (L) 3.4 - 5.0 g/dL   Prepare RBC: 1 Units   Result Value Ref Range    PRODUCT CODE [id number]     Unit Number [id number]     Unit ABO O     Unit RH POS     XM INTEP COMP     Dispense Status IS     Blood Expiration Date February 22, 2024 23:59 EST     PRODUCT BLOOD TYPE 5100     UNIT VOLUME 350                                Assessment/Plan   Patient presents from OSH to [facility] SICU for further management of hemorrhagic shock.     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 emergency department status post syncopal episode while at home 1/31.  Patient is s/p robotic total hysterectomy, LSO, right salpingectomy on 1/24/24 with Dr. Naidu.  In the ED patient was found to be significantly anemic at 5.7 (baseline 11).  She was started on transfusion of 2 units PRBCs as well as 2L IV NS.  PT 26.7, INR 2.3.  Patient was given Kcentra and vitamin K.  Leukocytosis of 24.  She was given Cipro, Flagyl, and vancomycin for antibiotic coverage.  CT chest abdomen pelvis remarkable for a heterogenous  lobulated collection extending from the pelvis to the right retroperitoneum measuring approximately 17 cm concerning for active extravasation.  There is also a small right intramuscular transverse abdominal hematoma measuring 1 cm.  Interventional radiology was consulted and patient was taken for urgent pelvic angiogram with bilateral uterine artery Gelfoam embolization .  She was admitted to the intensive care unit for further hemodynamic monitoring.  Since admission to the ICU patient has been hemodynamically stable, no pressor requirements.  Repeat a.m. hemoglobin 6.6, patient was transfused an additional 2 unit of PRBCs.  Gynecology consulted who recommends transfer to [facility] for possible surgical intervention.      Neuro: History of hemorrhagic CVA in 2019 without deficits. Syncopal episode on 1/31 at house, found by . Currently A&O x3. Acute pain.  - ongoing neuro and pain assessments  - prn hydromorphone -> convert to oral meds  - PT/OT   - home meds: oxycodone-acetaminophen 5-325mg q6h prn     CV: History of HTN and HLD. Baseline -130/70-80s per chart review. No TTE on file. High sensitivity troponin 74, 63 at OSH. Lactate WNL. Arrived to SICU hemodynamically intact without vasoactive support. Tachycardia improved after IVF bolus and RBC transfusions.  - goal map range 65-85  - volume/products as indicated   - consider TTE if becomes clinically unstable  - hold antihypertensives  - home meds: Norvasc 5mg, Lipitor 10mg, Labetalol 300mg BID, hydrochlorothiazide 25mg     PULM: No history of pulmonary disease. No active distress. Arrived to SICU on room air  - cxr prn daily  - goal SpO2 > 92%   - IS hourly while awake     GI: No history of GI disease. Acute abdominal pain since recent surgery. Elevated ALT, downtrended this am. Last BM 3 days ago  - NPO except for meds  - PPI ppx  - serial abdominal exams  - bowel regimen     GYN: History of Endometriosis and Leiomyoma. Recent ANTHONY-BSO  1/24/24. CT C/A/P showing heterogenous lobulated collection extending from pelvis to right retroperitoneum measuring 17cm likely related to recent hysterectomy. Probable small right intramuscular transverse abdominis hematoma measuring 1cm  -await OB/Gyn recs for today -> no OR planned, continue to watch clinically and trend cbc q6h     : No history of renal disease. Baseline creatinine 0.93. Hyponatremia resolved. Borderline hyperkalemia resolved. Rose cath inserted 2/1/24  - RFP daily and prn  - hourly I&Os  - continue rose cath  - replete electrolytes judiciously      HEME: Baseline H/H 11/37. History of Iron Deficiency Anemia. History of right leg DVT x2 (on Coumadin). Coumadin has been stopped since 1/13/24, Lovenox injections until her procedure. Restarted Coumadin on 1/27. Hemoglobin 5.7 on admit to ED s/p 2 units pRBC with appropriate increment to 7.4. INR therapeutic on admission, decreased to 1.1 after Kcentra and Vitamin K. IR consulted, patient taken for pelvic angioembolization s/p bilateral uterine artery Gelfoam embolization overnight 1/31-2/1. Hgb drop to 6.6 this am, transfused additional 2 RBC prior to transfer to [facility]. Received additional 2 RBC overnight for Hgb drop to 6.7.  - obtain cbc q6h  - TEG results without intervention indicated  - coags and fibrinogen daily  - keep T&S active (2/1)  - Transfuse if hgb < 7   - scds, hold subcutaneous heparin   - consult vasc med after H/H stabilizes for future AC plan  - home meds: MVI, ascorbic acid, ferrous sulfate, warfarin      ID: Afebrile. Leukocytosis improving. Blood cultures x2 1/31 NGTD. Started on Cipro, Vanc, Flagyl 1/31. UA negative 2/1. MRSA screen negative 2/1.  - discontinue cipro and flagyl   - follow up cultures   - ongoing infection surveillance     Endocrine: No history of endocrinopathies. Adequate glycemic control  -Blood glucose goal <180  - SSI #1  - q4h BG checks     Lines, Tubes, and Drains:   Rose Catheter (2/1/24)    PIV  x2 (20g)     Code Status: Full code     Family:  and sister in-law updated at bedside today     Disposition: Continue ICU care for now. Patient seen and discussed with Dr. Leiva.       I spent 60 minutes critcal care time on this patient      Mimi Watson, APRN-CNP

## 2024-02-02 NOTE — NURSING NOTE
RN asked SICU MD Anupama Dillon if patient was allowed ice chips even though NPO. Per MD Anupama Dillon, patient may have ice chips.

## 2024-02-02 NOTE — PROGRESS NOTES
PLAN: Pt is 42 yo with known 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 s/p robotic total hysterectomy, LSO, right salpingectomy for AUB-L [facility] in the s/o 17 cm retroperitoneal hematoma s/p bilateral UAE with concern for continued intraabdominal bleeding:  Continue serial exams with symptom management, trend labs, PT/OT, infection surveillance- follow-up on cultures, skin, line, ICU tlc.     PAYOR: Rafaela     DISPO: Home     SUPPORT/CONTACT: Eric (max) [phone]  Met with patient to complete assessment. Confirmed demographics on file, patient, spouse, and minor children live in home. IPTA, active with PCP, verbalized prescription coverage with no barrier to outpatient follow-up. Available to assist with any developing needs.

## 2024-02-02 NOTE — CARE PLAN
Problem: Fall/Injury  Goal: Not fall by end of shift  Outcome: Progressing  Goal: Be free from injury by end of the shift  Outcome: Progressing  Goal: Verbalize understanding of personal risk factors for fall in the hospital  Outcome: Progressing  Goal: Verbalize understanding of risk factor reduction measures to prevent injury from fall in the home  Outcome: Progressing  Goal: Use assistive devices by end of the shift  Outcome: Progressing  Goal: Pace activities to prevent fatigue by end of the shift  Outcome: Progressing     Problem: Pain  Goal: My pain/discomfort is manageable  Outcome: Progressing     Problem: Safety  Goal: Patient will be injury free during hospitalization  Outcome: Progressing  Goal: I will remain free of falls  Outcome: Progressing     Problem: Daily Care  Goal: Daily care needs are met  Outcome: Progressing     Problem: Psychosocial Needs  Goal: Demonstrates ability to cope with hospitalization/illness  Outcome: Progressing  Goal: Collaborate with me, my family, and caregiver to identify my specific goals  Outcome: Progressing     Problem: Discharge Barriers  Goal: My discharge needs are met  Outcome: Progressing     Problem: Skin  Goal: Decreased wound size/increased tissue granulation at next dressing change  Outcome: Progressing  Goal: Participates in plan/prevention/treatment measures  Outcome: Progressing  Goal: Prevent/manage excess moisture  Outcome: Progressing  Goal: Prevent/minimize sheer/friction injuries  Outcome: Progressing  Goal: Promote/optimize nutrition  Outcome: Progressing  Goal: Promote skin healing  Outcome: Progressing     Problem: Pain  Goal: STG - Patients pain is managed to allow active participation in daily activities  Outcome: Progressing   The patient's goals for the shift include  remain hemodynamically stable    The clinical goals for the shift include Remain comfortable

## 2024-02-02 NOTE — H&P
History Of Present Illness  Katarina Merino is a 43 y.o. female presenting with hemorrhagic shock.    Patient presents from OSH to [facility] SICU for further management of hemorrhagic shock.    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  Patient is s/p robotic total hysterectomy, LSO, right salpingectomy on 1/24/24 with Dr. Naidu at [facility]. In the ED patient was found to be significantly anemic at 5.7 (baseline 11).  She was started on transfusion of 2 units PRBCs as well as 2L IV NS.  PT 26.7, INR 2.3.  Patient was given Kcentra and vitamin K.  Leukocytosis of 24.  She was given Cipro, Flagyl, and vancomycin for antibiotic coverage.  CT chest abdomen pelvis remarkable for a heterogenous lobulated collection extending from the pelvis to the right retroperitoneum measuring approximately 17 cm concerning for active extravasation.  There is also a small right intramuscular transverse abdominal hematoma measuring 1 cm.  Interventional radiology was consulted and patient was taken for urgent pelvic angiogram with bilateral uterine artery Gelfoam embolization .  She was admitted to the intensive care unit for further hemodynamic monitoring.  Since admission to the ICU patient has been hemodynamically stable, no pressor requirements.  Repeat a.m. hemoglobin 6.6, patient was transfused an additional 2 unit of PRBCs.  Gynecology consulted who recommends transfer to [facility] [place] for possible surgical intervention. .     Past Medical History  Past Medical History:   Diagnosis Date    DVT of lower extremity (deep venous thrombosis) (CMS/[facility])     RLE - 2018 and 2020    Fibroid     Hyperlipidemia     Hypertension     Iron deficiency anemia due to chronic blood loss     Long term current use of anticoagulant      warfarin -  follows with Dr. So Escamilla    Menorrhagia with regular cycle     Stroke (CMS/HCC) 2019    ~ 6 weeks post partum, residual right sided weekness    Vision loss     glasses       Surgical History  Past Surgical History:   Procedure Laterality Date     SECTION, LOW TRANSVERSE      CT HEAD ANGIO W AND WO IV CONTRAST  10/08/2019    CT HEAD ANGIO W AND WO IV CONTRAST 10/8/2019 [facility]    LAPAROSCOPIC HYSTERECTOMY      RLTH, LSO , B salpingectomy    OOPHORECTOMY Left     w RTH    TONSILLECTOMY      UMBILICAL HERNIA REPAIR      WISDOM TOOTH EXTRACTION          Social History  She reports that she has never smoked. She has never used smokeless tobacco. She reports current alcohol use of about 2.0 standard drinks of alcohol per week. She reports that she does not use drugs.    Family History  Family History   Problem Relation Name Age of Onset    Diabetes Mother      Hypertension Mother      Hypertension Father      Hyperlipidemia Father      Hypertension Brother      Hyperlipidemia Brother      Breast cancer Other Aunt         Allergies  Penicillins    Medications Prior to Admission   Medication Sig Dispense Refill Last Dose    enoxaparin (Lovenox) 150 mg/mL injection Inject 1 mL (150 mg) under the skin every 12 hours.   2024    amLODIPine (Norvasc) 10 mg tablet Take 1 tablet (10 mg) by mouth once daily.       ascorbic acid (Vitamin C) 250 mg tablet Take 1 tablet (250 mg) by mouth once daily.       atorvastatin (Lipitor) 10 mg tablet Take 1 tablet (10 mg) by mouth once daily.       docusate sodium (Colace) 100 mg capsule Take 2 capsules (200 mg) by mouth 2 times a day. 30 capsule 0     ferrous sulfate 300 mg (60 mg iron)/5 mL syrup Take 5 mL (60 mg of iron) by mouth once daily.       hydroCHLOROthiazide (HYDRODiuril) 25 mg tablet Take 1 tablet (25 mg) by mouth once daily.       labetalol (Normodyne) 300 mg tablet Take 1 tablet (300 mg) by mouth 2 times a day.       multivitamin tablet  "Take 1 tablet by mouth once daily.       oxyCODONE-acetaminophen (Percocet) 5-325 mg tablet Take 1 tablet by mouth every 6 hours if needed for severe pain (7 - 10). 30 tablet 0     warfarin (Coumadin) 5 mg tablet Take 3 tablets (15 mg) by mouth once daily.           Review of Systems   Constitutional:  Positive for fatigue.   HENT: Negative.     Eyes: Negative.    Respiratory: Negative.     Cardiovascular: Negative.    Gastrointestinal:  Positive for abdominal pain.        Abdominal fullness, diffuse bilateral flank, RLQ and LLQ pain   Endocrine: Negative.    Genitourinary: Negative.    Musculoskeletal: Negative.    Allergic/Immunologic: Negative.    Neurological: Negative.    Psychiatric/Behavioral: Negative.          Physical Exam  Vitals reviewed.   Constitutional:       Appearance: Normal appearance. She is ill-appearing.   HENT:      Head: Normocephalic.      Nose: Nose normal.      Mouth/Throat:      Mouth: Mucous membranes are dry.   Eyes:      Pupils: Pupils are equal, round, and reactive to light.   Cardiovascular:      Rate and Rhythm: Regular rhythm. Tachycardia present.      Pulses: Normal pulses.   Pulmonary:      Effort: Pulmonary effort is normal.      Breath sounds: Normal breath sounds.      Comments: Room air  Abdominal:      General: Abdomen is protuberant. Bowel sounds are decreased.      Palpations: Abdomen is soft.      Tenderness: There is generalized abdominal tenderness and tenderness in the right lower quadrant and left lower quadrant.      Comments: Lap site RIRI, healing, no erythema or drainage     Musculoskeletal:      Cervical back: Normal range of motion.   Neurological:      Mental Status: She is alert.   Psychiatric:         Mood and Affect: Mood normal.         Behavior: Behavior normal.          Last Recorded Vitals  Blood pressure 113/63, pulse 85, temperature 36.2 °C (97.2 °F), temperature source Temporal, resp. rate 23, height 1.727 m (5' 8\"), weight 146 kg (322 lb 11.2 oz), " last menstrual period 01/07/2024, SpO2 100 %, not currently breastfeeding.    Heart Rate:  []   Temp:  [35.5 °C (95.9 °F)-36.8 °C (98.2 °F)]   Resp:  [15-25]   BP: ()/(61-88)   Weight:  [146 kg (322 lb 11.2 oz)]   SpO2:  [92 %-100 %]      Relevant Results  Scheduled medications  acetaminophen, 650 mg, oral, q6h  insulin lispro, 0-5 Units, subcutaneous, q4h  sennosides-docusate sodium, 2 tablet, oral, BID      Continuous medications  lactated Ringer's, 50 mL/hr, Last Rate: 50 mL/hr (02/02/24 1634)      PRN medications  PRN medications: calcium gluconate, calcium gluconate, dextrose 10 % in water (D10W), dextrose, glucagon, HYDROmorphone, magnesium sulfate, magnesium sulfate, oxyCODONE, oxyCODONE, potassium chloride              Assessment/Plan     Patient presents from OSH to [facility] SICU for further management of hemorrhagic shock.    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  Patient is s/p robotic total hysterectomy, LSO, right salpingectomy on 1/24/24 with Dr. Naidu.  In the ED patient was found to be significantly anemic at 5.7 (baseline 11).  She was started on transfusion of 2 units PRBCs as well as 2L IV NS.  PT 26.7, INR 2.3.  Patient was given Kcentra and vitamin K.  Leukocytosis of 24.  She was given Cipro, Flagyl, and vancomycin for antibiotic coverage.  CT chest abdomen pelvis remarkable for a heterogenous lobulated collection extending from the pelvis to the right retroperitoneum measuring approximately 17 cm concerning for active extravasation.  There is also a small right intramuscular transverse abdominal hematoma measuring 1 cm.  Interventional radiology was consulted and patient was taken for urgent pelvic angiogram with bilateral uterine artery Gelfoam  embolization .  She was admitted to the intensive care unit for further hemodynamic monitoring.  Since admission to the ICU patient has been hemodynamically stable, no pressor requirements.  Repeat a.m. hemoglobin 6.6, patient was transfused an additional 2 unit of PRBCs.  Gynecology consulted who recommends transfer to [facility] [place] for possible surgical intervention.     Neuro: History of hemorrhagic CVA in 2019 without deficits. Syncopal episode on 1/31 at house, found by . Currently A&O x3. OSH management of acute pain with IV morphine and hydromorphone.  - ongoing neuro and pain assessments  - prn hydromorphone  - PT/OT   - home meds: oxycodone-acetaminophen 5-325mg q6h prn    CV: History of HTN and HLD. Baseline -130/70-80s per chart review. No TTE on file. High sensitivity troponin 74, 63 at OSH. Lactate normalized 1/31 after volume resuscitation. Arrived to SICU hemodynamically intact without vasoactive support. Tachycardia to 105, endorses pain.  - goal map range 65-85  - volume/products as indicated  - consider TTE if becomes clinically unstable  - home meds: Norvasc 5mg, Lipitor 10mg, Labetalol 300mg BID, hydrochlorothiazide 25mg     PULM: No history of pulmonary disease. No active distress. Arrived to SICU on room air  - obtain cxr   - goal SpO2 > 92%   - IS hourly while awake     GI: No history of GI disease. Acute abdominal pain since recent surgery. Elevated ALT, downtrended this am. Last BM 2 days ago  - NPO except for meds  - PPI ppx  - serial abdominal exams  - bowel regimen     GYN: History of Endometriosis and Leiomyoma. Recent ANTHONY-BSO 1/24/24. CT C/A/P showing heterogenous lobulated collection extending from pelvis to right retroperitoneum measuring 17cm likely related to recent hysterectomy. Probable small right intramuscular transverse abdominis hematoma measuring 1cm  -await OB/Gyn recs    : No history of renal disease. Baseline creatinine 0.93. Hyponatremia to 131 this am.  Borderline hyperkalemia to 5.2. Rose cath inserted 2/1/24  - RFP now, daily and prn  - hourly I&Os  - continue rose cath  - replete electrolytes judiciously      HEME: Baseline H/H 11/37. History of Iron Deficiency Anemia. History of right leg DVT x2 (on Coumadin). Coumadin has been stopped since 1/13/24, Lovenox injections until her procedure. Restarted Coumadin on 1/27. Hemoglobin 5.7 on admit to ED s/p 2 units pRBC with appropriate increment to 7.4. INR therapeutic on admission, decreased to 1.1 after Kcentra and Vitamin K. IR consulted, patient taken for pelvic angioembolization s/p bilateral uterine artery Gelfoam embolization overnight 1/31-2/1. Hgb drop to 6.6 this am, transfused additional 2 RBC prior to transfer to [facility].  - obtain cbc, coags, fibrinogen now, daily and prn  - obtain T&S, place 4rbc, 4ffp on hold  - Transfuse if hgb < 7   - scds, hold subcutaneous heparin   - home meds: MVI, ascorbic acid, ferrous sulfate, warfarin      ID: Leukocytosis on admission 24.0, uptrend to 29.7. Blood cultures x2 1/31 NGTD. Started on Cipro, Vanc, Flagyl 1/31. UA negative 2/1. MRSA screen negative 2/1.  - continue cipro and flagyl per gyn recs  - follow up cultures   - ongoing infection surveillance    Endocrine: No history of endocrinopathies  -Blood glucose goal <180  - SSI #1  - q4h BG checks    Lines, Tubes, and Drains:   Rose Catheter (2/1/24)    PIV x2 (20g)     Code Status: Full code    Family:  and sister in-law on the way     Disposition: Continue ICU care.             Critical Care Time: 45 min       Casa Leiva MD

## 2024-02-02 NOTE — PROGRESS NOTES
Assessment/Plan   Pt is 42 yo with known 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 s/p robotic total hysterectomy, LSO, right salpingectomy for AUB-L transferred to [facility] ICU in the s/o 17 cm retroperitoneal hematoma s/p bilateral UAE with concern for continued intraabdominal bleeding.     Retroperitoneal hematoma  - CT (1/31): 17 cm pelvic hematoma extending into retroperitoneum  - s/p IR bilateral UAE (2/1)  - Currently hemodynamically normal, afebrile  - Abdominal exam benign  - Hgb downtrended overnight (2/2) with plan for 2 u pRBCs. Follow-up post-transfusion CBC. Continue to trend hemoglobin with goal Hgb > 7.  - No indication for surgical intervention at this time given stability of vital signs, and good urine output despite downtrend in hemoglobin. High threshold to operate given location of hematoma and reassuring clinical status indicates hematoma may have stabilized s/p UAE.  - Continue cipro/flagyl for infection ppx  - Hold AC. For vascular medicine consult to restart AC when stable from bleeding standpoint.  - Ok for clear liquids     Seen and discussed with Dr. Pj Medel MD  Gyn Pager [phone]  Gyn Phone [phone] 5P - 6:30 A; [phone] 6:30 A - 5 P     Subjective   Reports pain under better control. Denies lightheadedness/dizziness.    Objective  Last Vitals  Temp Pulse Resp BP MAP O2 Sat   36.2 °C (97.2 °F) 105 19 (!) 116/95  95 %      Physical Examination  Physical Exam  Constitutional:       General: She is not in acute distress.     Appearance: Normal appearance.   HENT:      Head: Normocephalic and atraumatic.      Nose: Nose normal.   Eyes:      General: No scleral icterus.     Extraocular Movements: Extraocular movements intact.   Cardiovascular:      Rate and Rhythm: Normal rate.   Pulmonary:      Effort: Pulmonary effort is normal.   Abdominal:      Palpations: Abdomen is soft.      Comments: Incision clean/dry/intact with dermabond, tympanic, mild diffuse tenderness, no rebound, no guarding    Musculoskeletal:         General: Normal range of motion.   Skin:     General: Skin is warm and dry.   Neurological:      Mental Status: She is alert.      Cranial Nerves: No cranial nerve deficit.   Psychiatric:         Mood and Affect: Mood normal.     Lab Review  Results for orders placed or performed during the hospital encounter of 02/01/24 (from the past 24 hour(s))   POCT GLUCOSE   Result Value Ref Range    POCT Glucose 144 (H) 74 - 99 mg/dL   CBC   Result Value Ref Range    WBC 28.4 (H) 4.4 - 11.3 x10*3/uL    nRBC 1.0 (H) 0.0 - 0.0 /100 WBCs    RBC 2.70 (L) 4.00 - 5.20 x10*6/uL    Hemoglobin 7.0 (L) 12.0 - 16.0 g/dL    Hematocrit 21.0 (L) 36.0 - 46.0 %    MCV 78 (L) 80 - 100 fL    MCH 25.9 (L) 26.0 - 34.0 pg    MCHC 33.3 32.0 - 36.0 g/dL    RDW 17.6 (H) 11.5 - 14.5 %    Platelets 115 (L) 150 - 450 x10*3/uL   POCT GLUCOSE   Result Value Ref Range    POCT Glucose 134 (H) 74 - 99 mg/dL   CBC   Result Value Ref Range    WBC 25.8 (H) 4.4 - 11.3 x10*3/uL    nRBC 1.0 (H) 0.0 - 0.0 /100 WBCs    RBC 2.52 (L) 4.00 - 5.20 x10*6/uL    Hemoglobin 6.7 (L) 12.0 - 16.0 g/dL    Hematocrit 19.3 (L) 36.0 - 46.0 %    MCV 77 (L) 80 - 100 fL    MCH 26.6 26.0 - 34.0 pg    MCHC 34.7 32.0 - 36.0 g/dL    RDW 17.6 (H) 11.5 - 14.5 %    Platelets 149 (L) 150 - 450 x10*3/uL   Prepare RBC: 1 Units   Result Value Ref Range    PRODUCT CODE U9434Y93     Unit Number [id number]     Unit ABO O     Unit RH POS     XM INTEP COMP     Dispense Status IS     Blood Expiration Date February 16, 2024 23:59 EST     PRODUCT BLOOD TYPE 5100     UNIT VOLUME 350    POCT GLUCOSE   Result Value Ref Range    POCT Glucose 134 (H) 74 - 99 mg/dL   Calcium, Ionized   Result Value Ref Range    POCT Calcium, Ionized 1.13 1.1 - 1.33 mmol/L   CBC   Result Value Ref Range    WBC 23.2 (H) 4.4 - 11.3 x10*3/uL    nRBC 0.9 (H) 0.0 - 0.0 /100 WBCs    RBC 2.78 (L) 4.00 - 5.20 x10*6/uL    Hemoglobin 7.3 (L) 12.0 - 16.0 g/dL    Hematocrit 22.4 (L) 36.0 - 46.0 %    MCV  81 80 - 100 fL    MCH 26.3 26.0 - 34.0 pg    MCHC 32.6 32.0 - 36.0 g/dL    RDW 17.2 (H) 11.5 - 14.5 %    Platelets 212 150 - 450 x10*3/uL   Coagulation Screen   Result Value Ref Range    Protime 11.9 9.8 - 12.8 seconds    INR 1.1 0.9 - 1.1    aPTT 17 (L) 27 - 38 seconds   Magnesium   Result Value Ref Range    Magnesium 2.06 1.60 - 2.40 mg/dL   Renal Function Panel   Result Value Ref Range    Glucose 126 (H) 74 - 99 mg/dL    Sodium 135 (L) 136 - 145 mmol/L    Potassium 4.7 3.5 - 5.3 mmol/L    Chloride 105 98 - 107 mmol/L    Bicarbonate 25 21 - 32 mmol/L    Anion Gap 10 10 - 20 mmol/L    Urea Nitrogen 23 6 - 23 mg/dL    Creatinine 0.94 0.50 - 1.05 mg/dL    eGFR 78 >60 mL/min/1.73m*2    Calcium 8.0 (L) 8.6 - 10.6 mg/dL    Phosphorus 3.1 2.5 - 4.9 mg/dL    Albumin 2.9 (L) 3.4 - 5.0 g/dL   Prepare RBC: 1 Units   Result Value Ref Range    PRODUCT CODE G0544N21     Unit Number [id number]     Unit ABO O     Unit RH POS     XM INTEP COMP     Dispense Status TR     Blood Expiration Date February 22, 2024 23:59 EST     PRODUCT BLOOD TYPE 5100     UNIT VOLUME 350    POCT GLUCOSE   Result Value Ref Range    POCT Glucose 125 (H) 74 - 99 mg/dL   TEG Clot Global Profile   Result Value Ref Range    R (Reaction Time) K 2.0 (L) 4.6 - 9.1 min    K (Clot Kinetics) 1.2 0.8 - 2.1 min    ANGLE 74.0 63.0 - 78.0 deg    MA (Max Amplitude) K 64.0 52.0 - 69.0 mm    R (Reaction Time) KH 2.0 (L) 4.3 - 8.3 min    MA (Max Amplitude) RT 63.0 52.0 - 70.0 mm    MA ( Terrence Amplitude) FF 20.0 15.0 - 32.0 mm    FLEV 356 278 - 581 mg/dL   CBC   Result Value Ref Range    WBC 20.5 (H) 4.4 - 11.3 x10*3/uL    nRBC 0.9 (H) 0.0 - 0.0 /100 WBCs    RBC 2.89 (L) 4.00 - 5.20 x10*6/uL    Hemoglobin 7.8 (L) 12.0 - 16.0 g/dL    Hematocrit 22.4 (L) 36.0 - 46.0 %    MCV 78 (L) 80 - 100 fL    MCH 27.0 26.0 - 34.0 pg    MCHC 34.8 32.0 - 36.0 g/dL    RDW 16.0 (H) 11.5 - 14.5 %    Platelets 220 150 - 450 x10*3/uL   POCT GLUCOSE   Result Value Ref Range    POCT Glucose  109 (H) 74 - 99 mg/dL   POCT GLUCOSE   Result Value Ref Range    POCT Glucose 93 74 - 99 mg/dL   CBC   Result Value Ref Range    WBC 19.3 (H) 4.4 - 11.3 x10*3/uL    nRBC 1.0 (H) 0.0 - 0.0 /100 WBCs    RBC 2.72 (L) 4.00 - 5.20 x10*6/uL    Hemoglobin 7.5 (L) 12.0 - 16.0 g/dL    Hematocrit 21.3 (L) 36.0 - 46.0 %    MCV 78 (L) 80 - 100 fL    MCH 27.6 26.0 - 34.0 pg    MCHC 35.2 32.0 - 36.0 g/dL    RDW 16.3 (H) 11.5 - 14.5 %    Platelets 213 150 - 450 x10*3/uL

## 2024-02-03 LAB
ALBUMIN SERPL BCP-MCNC: 3 G/DL (ref 3.4–5)
ANION GAP SERPL CALC-SCNC: 12 MMOL/L (ref 10–20)
APTT PPP: 28 SECONDS (ref 27–38)
BLOOD EXPIRATION DATE: NORMAL
BLOOD EXPIRATION DATE: NORMAL
BUN SERPL-MCNC: 15 MG/DL (ref 6–23)
CA-I BLD-SCNC: 0.82 MMOL/L (ref 1.1–1.33)
CALCIUM SERPL-MCNC: 8.1 MG/DL (ref 8.6–10.6)
CHLORIDE SERPL-SCNC: 104 MMOL/L (ref 98–107)
CO2 SERPL-SCNC: 25 MMOL/L (ref 21–32)
CREAT SERPL-MCNC: 0.8 MG/DL (ref 0.5–1.05)
DISPENSE STATUS: NORMAL
DISPENSE STATUS: NORMAL
EGFRCR SERPLBLD CKD-EPI 2021: >90 ML/MIN/1.73M*2
ERYTHROCYTE [DISTWIDTH] IN BLOOD BY AUTOMATED COUNT: 16.4 % (ref 11.5–14.5)
ERYTHROCYTE [DISTWIDTH] IN BLOOD BY AUTOMATED COUNT: 16.6 % (ref 11.5–14.5)
ERYTHROCYTE [DISTWIDTH] IN BLOOD BY AUTOMATED COUNT: 16.7 % (ref 11.5–14.5)
FIBRINOGEN PPP-MCNC: 478 MG/DL (ref 200–400)
GLUCOSE BLD MANUAL STRIP-MCNC: 100 MG/DL (ref 74–99)
GLUCOSE BLD MANUAL STRIP-MCNC: 102 MG/DL (ref 74–99)
GLUCOSE BLD MANUAL STRIP-MCNC: 104 MG/DL (ref 74–99)
GLUCOSE BLD MANUAL STRIP-MCNC: 114 MG/DL (ref 74–99)
GLUCOSE BLD MANUAL STRIP-MCNC: 115 MG/DL (ref 74–99)
GLUCOSE BLD MANUAL STRIP-MCNC: 125 MG/DL (ref 74–99)
GLUCOSE SERPL-MCNC: 107 MG/DL (ref 74–99)
HCT VFR BLD AUTO: 20.4 % (ref 36–46)
HCT VFR BLD AUTO: 20.9 % (ref 36–46)
HCT VFR BLD AUTO: 22.8 % (ref 36–46)
HGB BLD-MCNC: 6.9 G/DL (ref 12–16)
HGB BLD-MCNC: 7.1 G/DL (ref 12–16)
HGB BLD-MCNC: 7.9 G/DL (ref 12–16)
INR PPP: 1 (ref 0.9–1.1)
MAGNESIUM SERPL-MCNC: 2.25 MG/DL (ref 1.6–2.4)
MCH RBC QN AUTO: 26.6 PG (ref 26–34)
MCH RBC QN AUTO: 26.7 PG (ref 26–34)
MCH RBC QN AUTO: 27.2 PG (ref 26–34)
MCHC RBC AUTO-ENTMCNC: 33.8 G/DL (ref 32–36)
MCHC RBC AUTO-ENTMCNC: 34 G/DL (ref 32–36)
MCHC RBC AUTO-ENTMCNC: 34.6 G/DL (ref 32–36)
MCV RBC AUTO: 78 FL (ref 80–100)
MCV RBC AUTO: 79 FL (ref 80–100)
MCV RBC AUTO: 79 FL (ref 80–100)
NRBC BLD-RTO: 0.9 /100 WBCS (ref 0–0)
NRBC BLD-RTO: 0.9 /100 WBCS (ref 0–0)
NRBC BLD-RTO: 1.3 /100 WBCS (ref 0–0)
PHOSPHATE SERPL-MCNC: 3.1 MG/DL (ref 2.5–4.9)
PLATELET # BLD AUTO: 223 X10*3/UL (ref 150–450)
PLATELET # BLD AUTO: 226 X10*3/UL (ref 150–450)
PLATELET # BLD AUTO: 230 X10*3/UL (ref 150–450)
POTASSIUM SERPL-SCNC: 3.8 MMOL/L (ref 3.5–5.3)
PRODUCT BLOOD TYPE: 5100
PRODUCT BLOOD TYPE: 5100
PRODUCT CODE: NORMAL
PRODUCT CODE: NORMAL
PROTHROMBIN TIME: 11.8 SECONDS (ref 9.8–12.8)
RBC # BLD AUTO: 2.58 X10*6/UL (ref 4–5.2)
RBC # BLD AUTO: 2.67 X10*6/UL (ref 4–5.2)
RBC # BLD AUTO: 2.9 X10*6/UL (ref 4–5.2)
SODIUM SERPL-SCNC: 137 MMOL/L (ref 136–145)
UNIT ABO: NORMAL
UNIT ABO: NORMAL
UNIT NUMBER: NORMAL
UNIT NUMBER: NORMAL
UNIT RH: NORMAL
UNIT RH: NORMAL
UNIT VOLUME: 350
UNIT VOLUME: 350
WBC # BLD AUTO: 16.1 X10*3/UL (ref 4.4–11.3)
WBC # BLD AUTO: 16.6 X10*3/UL (ref 4.4–11.3)
WBC # BLD AUTO: 17 X10*3/UL (ref 4.4–11.3)
XM INTEP: NORMAL
XM INTEP: NORMAL

## 2024-02-03 PROCEDURE — 99233 SBSQ HOSP IP/OBS HIGH 50: CPT | Performed by: STUDENT IN AN ORGANIZED HEALTH CARE EDUCATION/TRAINING PROGRAM

## 2024-02-03 PROCEDURE — 85384 FIBRINOGEN ACTIVITY: CPT | Performed by: NURSE PRACTITIONER

## 2024-02-03 PROCEDURE — 85610 PROTHROMBIN TIME: CPT | Performed by: NURSE PRACTITIONER

## 2024-02-03 PROCEDURE — 80069 RENAL FUNCTION PANEL: CPT | Performed by: NURSE PRACTITIONER

## 2024-02-03 PROCEDURE — 2500000001 HC RX 250 WO HCPCS SELF ADMINISTERED DRUGS (ALT 637 FOR MEDICARE OP)

## 2024-02-03 PROCEDURE — 2020000001 HC ICU ROOM DAILY

## 2024-02-03 PROCEDURE — 83735 ASSAY OF MAGNESIUM: CPT | Performed by: NURSE PRACTITIONER

## 2024-02-03 PROCEDURE — 36415 COLL VENOUS BLD VENIPUNCTURE: CPT | Performed by: NURSE PRACTITIONER

## 2024-02-03 PROCEDURE — P9016 RBC LEUKOCYTES REDUCED: HCPCS

## 2024-02-03 PROCEDURE — 99233 SBSQ HOSP IP/OBS HIGH 50: CPT

## 2024-02-03 PROCEDURE — 2500000004 HC RX 250 GENERAL PHARMACY W/ HCPCS (ALT 636 FOR OP/ED): Performed by: NURSE PRACTITIONER

## 2024-02-03 PROCEDURE — 85027 COMPLETE CBC AUTOMATED: CPT | Performed by: NURSE PRACTITIONER

## 2024-02-03 PROCEDURE — 2500000001 HC RX 250 WO HCPCS SELF ADMINISTERED DRUGS (ALT 637 FOR MEDICARE OP): Performed by: NURSE PRACTITIONER

## 2024-02-03 PROCEDURE — 82947 ASSAY GLUCOSE BLOOD QUANT: CPT

## 2024-02-03 PROCEDURE — 82330 ASSAY OF CALCIUM: CPT | Performed by: NURSE PRACTITIONER

## 2024-02-03 PROCEDURE — 36430 TRANSFUSION BLD/BLD COMPNT: CPT

## 2024-02-03 RX ORDER — POTASSIUM CHLORIDE 1.5 G/1.58G
20 POWDER, FOR SOLUTION ORAL EVERY 6 HOURS PRN
Status: DISCONTINUED | OUTPATIENT
Start: 2024-02-03 | End: 2024-02-04

## 2024-02-03 RX ORDER — POTASSIUM CHLORIDE 1.5 G/1.58G
40 POWDER, FOR SOLUTION ORAL EVERY 6 HOURS PRN
Status: DISCONTINUED | OUTPATIENT
Start: 2024-02-03 | End: 2024-02-04

## 2024-02-03 RX ORDER — POTASSIUM CHLORIDE 20 MEQ/1
20 TABLET, EXTENDED RELEASE ORAL EVERY 6 HOURS PRN
Status: DISCONTINUED | OUTPATIENT
Start: 2024-02-03 | End: 2024-02-04

## 2024-02-03 RX ORDER — POTASSIUM CHLORIDE 20 MEQ/1
40 TABLET, EXTENDED RELEASE ORAL EVERY 6 HOURS PRN
Status: DISCONTINUED | OUTPATIENT
Start: 2024-02-03 | End: 2024-02-04

## 2024-02-03 RX ADMIN — POTASSIUM CHLORIDE 20 MEQ: 1.5 POWDER, FOR SOLUTION ORAL at 17:13

## 2024-02-03 RX ADMIN — CALCIUM GLUCONATE 2 G: 20 INJECTION, SOLUTION INTRAVENOUS at 04:59

## 2024-02-03 RX ADMIN — HYDROMORPHONE HYDROCHLORIDE 0.4 MG: 1 INJECTION, SOLUTION INTRAMUSCULAR; INTRAVENOUS; SUBCUTANEOUS at 06:25

## 2024-02-03 RX ADMIN — OXYCODONE HYDROCHLORIDE 10 MG: 5 TABLET ORAL at 22:34

## 2024-02-03 RX ADMIN — SENNOSIDES AND DOCUSATE SODIUM 2 TABLET: 8.6; 5 TABLET ORAL at 22:34

## 2024-02-03 RX ADMIN — OXYCODONE HYDROCHLORIDE 10 MG: 5 TABLET ORAL at 18:26

## 2024-02-03 RX ADMIN — ACETAMINOPHEN 650 MG: 325 TABLET ORAL at 09:44

## 2024-02-03 RX ADMIN — SENNOSIDES AND DOCUSATE SODIUM 2 TABLET: 8.6; 5 TABLET ORAL at 09:44

## 2024-02-03 RX ADMIN — ACETAMINOPHEN 650 MG: 325 TABLET ORAL at 22:34

## 2024-02-03 RX ADMIN — ACETAMINOPHEN 650 MG: 325 TABLET ORAL at 15:34

## 2024-02-03 RX ADMIN — OXYCODONE HYDROCHLORIDE 10 MG: 5 TABLET ORAL at 09:44

## 2024-02-03 RX ADMIN — ACETAMINOPHEN 650 MG: 325 TABLET ORAL at 03:19

## 2024-02-03 RX ADMIN — OXYCODONE HYDROCHLORIDE 5 MG: 5 TABLET ORAL at 03:19

## 2024-02-03 RX ADMIN — SODIUM CHLORIDE, POTASSIUM CHLORIDE, SODIUM LACTATE AND CALCIUM CHLORIDE 50 ML/HR: 600; 310; 30; 20 INJECTION, SOLUTION INTRAVENOUS at 00:12

## 2024-02-03 RX ADMIN — OXYCODONE HYDROCHLORIDE 10 MG: 5 TABLET ORAL at 13:56

## 2024-02-03 ASSESSMENT — COGNITIVE AND FUNCTIONAL STATUS - GENERAL
PERSONAL GROOMING: A LITTLE
HELP NEEDED FOR BATHING: A LITTLE
CLIMB 3 TO 5 STEPS WITH RAILING: A LITTLE
DRESSING REGULAR LOWER BODY CLOTHING: A LITTLE
EATING MEALS: A LITTLE
MOVING FROM LYING ON BACK TO SITTING ON SIDE OF FLAT BED WITH BEDRAILS: A LITTLE
STANDING UP FROM CHAIR USING ARMS: A LITTLE
DRESSING REGULAR UPPER BODY CLOTHING: A LITTLE
TURNING FROM BACK TO SIDE WHILE IN FLAT BAD: A LOT
MOBILITY SCORE: 17
MOVING TO AND FROM BED TO CHAIR: A LITTLE
TOILETING: A LITTLE
DAILY ACTIVITIY SCORE: 18
WALKING IN HOSPITAL ROOM: A LITTLE

## 2024-02-03 ASSESSMENT — PAIN SCALES - GENERAL
PAINLEVEL_OUTOF10: 0 - NO PAIN
PAINLEVEL_OUTOF10: 8
PAINLEVEL_OUTOF10: 4
PAINLEVEL_OUTOF10: 0 - NO PAIN
PAINLEVEL_OUTOF10: 0 - NO PAIN
PAINLEVEL_OUTOF10: 8
PAINLEVEL_OUTOF10: 7
PAINLEVEL_OUTOF10: 4
PAINLEVEL_OUTOF10: 0 - NO PAIN
PAINLEVEL_OUTOF10: 0 - NO PAIN
PAINLEVEL_OUTOF10: 6
PAINLEVEL_OUTOF10: 8

## 2024-02-03 ASSESSMENT — PAIN DESCRIPTION - LOCATION
LOCATION: ABDOMEN
LOCATION: ABDOMEN

## 2024-02-03 ASSESSMENT — PAIN - FUNCTIONAL ASSESSMENT
PAIN_FUNCTIONAL_ASSESSMENT: 0-10

## 2024-02-03 NOTE — PROGRESS NOTES
Assessment/Plan   Pt is 42 yo with known 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 s/p robotic total hysterectomy, LSO, right salpingectomy for AUB-L [place] [facility] [place] in the s/o 17 cm retroperitoneal hematoma s/p bilateral UAE 2/1 with concern for continued intraabdominal bleeding.     Retroperitoneal hematoma  - CT (1/31): 17 cm pelvic hematoma extending into retroperitoneum  - s/p IR bilateral UAE (2/1)  - Currently hemodynamically normal, afebrile  - Abdominal exam benign  - Hgb downtrended (2/2) to 6.7 with 2 u pRBCs transfused. Stable in 7s since.  Continue to trend hemoglobin with goal Hgb > 7.  - No indication for surgical intervention at this time given stability of vital signs, and good urine output despite downtrend in hemoglobin. High threshold to operate given location of hematoma and reassuring clinical status indicates hematoma may have stabilized s/p UAE.  - Continue cipro/flagyl for infection ppx  - Hold AC. For vascular medicine consult to restart AC when stable from bleeding standpoint.  - Ok for clear liquids     discussed with Dr. Pj Zuniga MD  Gyn Pager [phone]  Gyn Phone [phone] 5P - 6:30 A; [id number] 6:30 A - 5 P     Subjective   Reports pain under better control. Denies lightheadedness/dizziness. Worked with PT yesterday     Objective  Last Vitals  Temp Pulse Resp BP MAP O2 Sat   36.2 °C (97.2 °F) 105 19 (!) 116/95  95 %      Physical Examination  Physical Exam  Constitutional:       General: She is not in acute distress.     Appearance: Normal appearance.   HENT:      Head: Normocephalic and atraumatic.      Nose: Nose normal.   Eyes:      General: No scleral icterus.     Extraocular Movements: Extraocular movements intact.   Cardiovascular:      Rate and Rhythm: Normal rate.   Pulmonary:      Effort: Pulmonary effort is normal.   Abdominal:      Palpations: Abdomen is soft.      Comments: Incision clean/dry/intact with dermabond, tympanic, mild diffuse tenderness, no rebound, no guarding    Musculoskeletal:         General: Normal range of motion.   Skin:     General: Skin is warm and dry.   Neurological:      Mental Status: She is alert.      Cranial Nerves: No cranial nerve deficit.   Psychiatric:         Mood and Affect: Mood normal.   : rose in place draining yellow-orange urine     Lab Review  Results for orders placed or performed during the hospital encounter of 02/01/24 (from the past 24 hour(s))   POCT GLUCOSE   Result Value Ref Range    POCT Glucose 125 (H) 74 - 99 mg/dL   TEG Clot Global Profile   Result Value Ref Range    R (Reaction Time) K 2.0 (L) 4.6 - 9.1 min    K (Clot Kinetics) 1.2 0.8 - 2.1 min    ANGLE 74.0 63.0 - 78.0 deg    MA (Max Amplitude) K 64.0 52.0 - 69.0 mm    R (Reaction Time) KH 2.0 (L) 4.3 - 8.3 min    MA (Max Amplitude) RT 63.0 52.0 - 70.0 mm    MA ( Terrence Amplitude) FF 20.0 15.0 - 32.0 mm    FLEV 356 278 - 581 mg/dL   CBC   Result Value Ref Range    WBC 20.5 (H) 4.4 - 11.3 x10*3/uL    nRBC 0.9 (H) 0.0 - 0.0 /100 WBCs    RBC 2.89 (L) 4.00 - 5.20 x10*6/uL    Hemoglobin 7.8 (L) 12.0 - 16.0 g/dL    Hematocrit 22.4 (L) 36.0 - 46.0 %    MCV 78 (L) 80 - 100 fL    MCH 27.0 26.0 - 34.0 pg    MCHC 34.8 32.0 - 36.0 g/dL    RDW 16.0 (H) 11.5 - 14.5 %    Platelets 220 150 - 450 x10*3/uL   POCT GLUCOSE   Result Value Ref Range    POCT Glucose 109 (H) 74 - 99 mg/dL   POCT GLUCOSE   Result Value Ref Range    POCT Glucose 93 74 - 99 mg/dL   CBC   Result Value Ref Range    WBC 19.3 (H) 4.4 - 11.3 x10*3/uL    nRBC 1.0 (H) 0.0 - 0.0 /100 WBCs    RBC 2.72 (L) 4.00 - 5.20 x10*6/uL    Hemoglobin 7.5 (L) 12.0 - 16.0 g/dL    Hematocrit 21.3 (L) 36.0 - 46.0 %    MCV 78 (L) 80 - 100 fL    MCH 27.6 26.0 - 34.0 pg    MCHC 35.2 32.0 - 36.0 g/dL    RDW 16.3 (H) 11.5 - 14.5 %    Platelets 213 150 - 450 x10*3/uL   POCT GLUCOSE   Result Value Ref Range    POCT Glucose 104 (H) 74 - 99 mg/dL   CBC   Result Value Ref Range    WBC 16.5 (H) 4.4 - 11.3 x10*3/uL    nRBC 0.9 (H) 0.0 - 0.0 /100 WBCs    RBC  2.55 (L) 4.00 - 5.20 x10*6/uL    Hemoglobin 7.0 (L) 12.0 - 16.0 g/dL    Hematocrit 19.8 (L) 36.0 - 46.0 %    MCV 78 (L) 80 - 100 fL    MCH 27.5 26.0 - 34.0 pg    MCHC 35.4 32.0 - 36.0 g/dL    RDW 16.5 (H) 11.5 - 14.5 %    Platelets 155 150 - 450 x10*3/uL   POCT GLUCOSE   Result Value Ref Range    POCT Glucose 97 74 - 99 mg/dL   Calcium, Ionized   Result Value Ref Range    POCT Calcium, Ionized 0.82 (L) 1.1 - 1.33 mmol/L   POCT GLUCOSE   Result Value Ref Range    POCT Glucose 114 (H) 74 - 99 mg/dL   CBC   Result Value Ref Range    WBC 16.6 (H) 4.4 - 11.3 x10*3/uL    nRBC 0.9 (H) 0.0 - 0.0 /100 WBCs    RBC 2.90 (L) 4.00 - 5.20 x10*6/uL    Hemoglobin 7.9 (L) 12.0 - 16.0 g/dL    Hematocrit 22.8 (L) 36.0 - 46.0 %    MCV 79 (L) 80 - 100 fL    MCH 27.2 26.0 - 34.0 pg    MCHC 34.6 32.0 - 36.0 g/dL    RDW 16.6 (H) 11.5 - 14.5 %    Platelets 230 150 - 450 x10*3/uL   Coagulation Screen   Result Value Ref Range    Protime 11.8 9.8 - 12.8 seconds    INR 1.0 0.9 - 1.1    aPTT 28 27 - 38 seconds   Magnesium   Result Value Ref Range    Magnesium 2.25 1.60 - 2.40 mg/dL   Renal Function Panel   Result Value Ref Range    Glucose 107 (H) 74 - 99 mg/dL    Sodium 137 136 - 145 mmol/L    Potassium 3.8 3.5 - 5.3 mmol/L    Chloride 104 98 - 107 mmol/L    Bicarbonate 25 21 - 32 mmol/L    Anion Gap 12 10 - 20 mmol/L    Urea Nitrogen 15 6 - 23 mg/dL    Creatinine 0.80 0.50 - 1.05 mg/dL    eGFR >90 >60 mL/min/1.73m*2    Calcium 8.1 (L) 8.6 - 10.6 mg/dL    Phosphorus 3.1 2.5 - 4.9 mg/dL    Albumin 3.0 (L) 3.4 - 5.0 g/dL

## 2024-02-03 NOTE — PROGRESS NOTES
"Katarina Merino is a 43 y.o. female on day 2 of admission presenting with Hemorrhagic shock (CMS/HCC).    Subjective   Hemodynamics intact, minimal pain today, no complaints       Objective     Physical Exam  Vitals and nursing note reviewed.   Constitutional:       General: She is not in acute distress.     Appearance: She is obese. She is not ill-appearing.   HENT:      Head: Normocephalic and atraumatic.      Nose: Nose normal.      Mouth/Throat:      Mouth: Mucous membranes are moist.   Eyes:      Pupils: Pupils are equal, round, and reactive to light.   Cardiovascular:      Rate and Rhythm: Normal rate and regular rhythm.      Pulses: Normal pulses.   Pulmonary:      Effort: Pulmonary effort is normal. No respiratory distress.      Breath sounds: No stridor. Examination of the right-lower field reveals decreased breath sounds. Examination of the left-lower field reveals decreased breath sounds. Decreased breath sounds present. No wheezing, rhonchi or rales.      Comments: 2L NC  Abdominal:      General: Abdomen is protuberant. Bowel sounds are normal.      Palpations: Abdomen is soft.      Tenderness: There is abdominal tenderness in the right lower quadrant and left lower quadrant. There is no guarding.      Comments: Still endorses feelings of fullness   Genitourinary:     Comments: Hernandez with clear yellow urine  Musculoskeletal:      Cervical back: Normal range of motion.   Skin:     General: Skin is warm and dry.      Capillary Refill: Capillary refill takes less than 2 seconds.   Neurological:      General: No focal deficit present.      Mental Status: She is alert and oriented to person, place, and time.   Psychiatric:         Mood and Affect: Mood normal.         Behavior: Behavior normal.       Last Recorded Vitals  Blood pressure 115/67, pulse 95, temperature 36.7 °C (98.1 °F), temperature source Temporal, resp. rate 19, height 1.727 m (5' 8\"), weight 146 kg (322 lb 11.2 oz), last menstrual period " 01/07/2024, SpO2 90 %, not currently breastfeeding.    Heart Rate:  []   Temp:  [35.9 °C (96.6 °F)-36.7 °C (98.1 °F)]   Resp:  [14-32]   BP: ()/(57-80)   SpO2:  [88 %-100 %]    Intake/Output last 3 Shifts:  I/O last 3 completed shifts:  In: 4713.3 (32.2 mL/kg) [P.O.:735; I.V.:3028.3 (20.7 mL/kg); Blood:350; IV Piggyback:600]  Out: 2787 (19 mL/kg) [Urine:2787 (0.5 mL/kg/hr)]  Weight: 146.4 kg       Intake/Output Summary (Last 24 hours) at 2/3/2024 1151  Last data filed at 2/3/2024 1003  Gross per 24 hour   Intake 2165.84 ml   Output 2277 ml   Net -111.16 ml          Relevant Results  Scheduled medications  acetaminophen, 650 mg, oral, q6h  insulin lispro, 0-5 Units, subcutaneous, q4h  sennosides-docusate sodium, 2 tablet, oral, BID      Continuous medications       PRN medications  PRN medications: calcium gluconate, calcium gluconate, dextrose 10 % in water (D10W), dextrose, glucagon, HYDROmorphone, magnesium sulfate, magnesium sulfate, oxyCODONE, oxyCODONE, potassium chloride     Results for orders placed or performed during the hospital encounter of 02/01/24 (from the past 24 hour(s))   POCT GLUCOSE   Result Value Ref Range    POCT Glucose 93 74 - 99 mg/dL   CBC   Result Value Ref Range    WBC 19.3 (H) 4.4 - 11.3 x10*3/uL    nRBC 1.0 (H) 0.0 - 0.0 /100 WBCs    RBC 2.72 (L) 4.00 - 5.20 x10*6/uL    Hemoglobin 7.5 (L) 12.0 - 16.0 g/dL    Hematocrit 21.3 (L) 36.0 - 46.0 %    MCV 78 (L) 80 - 100 fL    MCH 27.6 26.0 - 34.0 pg    MCHC 35.2 32.0 - 36.0 g/dL    RDW 16.3 (H) 11.5 - 14.5 %    Platelets 213 150 - 450 x10*3/uL   POCT GLUCOSE   Result Value Ref Range    POCT Glucose 104 (H) 74 - 99 mg/dL   CBC   Result Value Ref Range    WBC 16.5 (H) 4.4 - 11.3 x10*3/uL    nRBC 0.9 (H) 0.0 - 0.0 /100 WBCs    RBC 2.55 (L) 4.00 - 5.20 x10*6/uL    Hemoglobin 7.0 (L) 12.0 - 16.0 g/dL    Hematocrit 19.8 (L) 36.0 - 46.0 %    MCV 78 (L) 80 - 100 fL    MCH 27.5 26.0 - 34.0 pg    MCHC 35.4 32.0 - 36.0 g/dL    RDW 16.5 (H)  11.5 - 14.5 %    Platelets 155 150 - 450 x10*3/uL   POCT GLUCOSE   Result Value Ref Range    POCT Glucose 97 74 - 99 mg/dL   Calcium, Ionized   Result Value Ref Range    POCT Calcium, Ionized 0.82 (L) 1.1 - 1.33 mmol/L   POCT GLUCOSE   Result Value Ref Range    POCT Glucose 114 (H) 74 - 99 mg/dL   CBC   Result Value Ref Range    WBC 16.6 (H) 4.4 - 11.3 x10*3/uL    nRBC 0.9 (H) 0.0 - 0.0 /100 WBCs    RBC 2.90 (L) 4.00 - 5.20 x10*6/uL    Hemoglobin 7.9 (L) 12.0 - 16.0 g/dL    Hematocrit 22.8 (L) 36.0 - 46.0 %    MCV 79 (L) 80 - 100 fL    MCH 27.2 26.0 - 34.0 pg    MCHC 34.6 32.0 - 36.0 g/dL    RDW 16.6 (H) 11.5 - 14.5 %    Platelets 230 150 - 450 x10*3/uL   Coagulation Screen   Result Value Ref Range    Protime 11.8 9.8 - 12.8 seconds    INR 1.0 0.9 - 1.1    aPTT 28 27 - 38 seconds   Magnesium   Result Value Ref Range    Magnesium 2.25 1.60 - 2.40 mg/dL   Renal Function Panel   Result Value Ref Range    Glucose 107 (H) 74 - 99 mg/dL    Sodium 137 136 - 145 mmol/L    Potassium 3.8 3.5 - 5.3 mmol/L    Chloride 104 98 - 107 mmol/L    Bicarbonate 25 21 - 32 mmol/L    Anion Gap 12 10 - 20 mmol/L    Urea Nitrogen 15 6 - 23 mg/dL    Creatinine 0.80 0.50 - 1.05 mg/dL    eGFR >90 >60 mL/min/1.73m*2    Calcium 8.1 (L) 8.6 - 10.6 mg/dL    Phosphorus 3.1 2.5 - 4.9 mg/dL    Albumin 3.0 (L) 3.4 - 5.0 g/dL   POCT GLUCOSE   Result Value Ref Range    POCT Glucose 100 (H) 74 - 99 mg/dL   CBC   Result Value Ref Range    WBC 17.0 (H) 4.4 - 11.3 x10*3/uL    nRBC 0.9 (H) 0.0 - 0.0 /100 WBCs    RBC 2.67 (L) 4.00 - 5.20 x10*6/uL    Hemoglobin 7.1 (L) 12.0 - 16.0 g/dL    Hematocrit 20.9 (L) 36.0 - 46.0 %    MCV 78 (L) 80 - 100 fL    MCH 26.6 26.0 - 34.0 pg    MCHC 34.0 32.0 - 36.0 g/dL    RDW 16.7 (H) 11.5 - 14.5 %    Platelets 226 150 - 450 x10*3/uL   POCT GLUCOSE   Result Value Ref Range    POCT Glucose 115 (H) 74 - 99 mg/dL       Assessment/Plan   Patient presents from OSH to [facility] SICU for further management of hemorrhagic shock.      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  Patient is s/p robotic total hysterectomy, LSO, right salpingectomy on 1/24/24 with Dr. Naidu.  In the ED patient was found to be significantly anemic at 5.7 (baseline 11).  She was started on transfusion of 2 units PRBCs as well as 2L IV NS.  PT 26.7, INR 2.3.  Patient was given Kcentra and vitamin K.  Leukocytosis of 24.  She was given Cipro, Flagyl, and vancomycin for antibiotic coverage.  CT chest abdomen pelvis remarkable for a heterogenous lobulated collection extending from the pelvis to the right retroperitoneum measuring approximately 17 cm concerning for active extravasation.  There is also a small right intramuscular transverse abdominal hematoma measuring 1 cm.  Interventional radiology was consulted and patient was taken for urgent pelvic angiogram with bilateral uterine artery Gelfoam embolization .  She was admitted to the intensive care unit for further hemodynamic monitoring.  Since admission to the ICU patient has been hemodynamically stable, no pressor requirements.  Repeat a.m. hemoglobin 6.6, patient was transfused an additional 2 unit of PRBCs.  Gynecology consulted who recommends transfer to [facility] [place] for possible surgical intervention.      Neuro: History of hemorrhagic CVA in 2019 without deficits. Syncopal episode on 1/31 at house, found by . Currently A&O x3. Acute pain.  - ongoing neuro and pain assessments  - prn hydromorphone -> convert to oral meds  - PT/OT   - home meds: oxycodone-acetaminophen 5-325mg q6h prn     CV: History of HTN and HLD. Baseline -130/70-80s per chart review. No TTE on file. High sensitivity troponin 74, 63 at OSH. Lactate WNL. Arrived to SICU hemodynamically intact without  vasoactive support. Tachycardia improved after IVF bolus and RBC transfusions.  - goal map range 65-85  - volume/products as indicated   - consider TTE if becomes clinically unstable  - hold antihypertensives  - home meds: Norvasc 5mg, Lipitor 10mg, Labetalol 300mg BID, hydrochlorothiazide 25mg     PULM: No history of pulmonary disease. No active distress. Arrived to SICU on room air, currently on 2L NC.  - cxr prn daily  - goal SpO2 > 92%   - IS hourly while awake     GI: No history of GI disease. Acute abdominal pain since recent surgery. Last BM 3 days ago  - NPO except for meds  - PPI ppx  - serial abdominal exams  - bowel regimen     GYN: History of Endometriosis and Leiomyoma. Recent ANTHONY-BSO 1/24/24. CT C/A/P showing heterogenous lobulated collection extending from pelvis to right retroperitoneum measuring 17cm likely related to recent hysterectomy. Probable small right intramuscular transverse abdominis hematoma measuring 1cm  -await OB/Gyn recs for today -> no OR planned, continue to watch clinically and trend cbc q6h     : No history of renal disease. Baseline creatinine 0.93. Hyponatremia resolved. Borderline hyperkalemia resolved. Rose cath inserted 2/1/24  - RFP daily and prn  - hourly I&Os  - continue rose cath  - replete electrolytes judiciously      HEME: Baseline H/H 11/37. History of Iron Deficiency Anemia. History of right leg DVT x2 (on Coumadin). Coumadin has been stopped since 1/13/24, Lovenox injections until her procedure. Restarted Coumadin on 1/27. Hemoglobin 5.7 on admit to ED s/p 2 units pRBC with appropriate increment to 7.4. INR therapeutic on admission, decreased to 1.1 after Kcentra and Vitamin K. IR consulted, patient taken for pelvic angioembolization s/p bilateral uterine artery Gelfoam embolization overnight 1/31-2/1.   - continue cbc q6h  - TEG results without intervention indicated  - coags and fibrinogen daily  - keep T&S active (2/1)  - Transfuse if hgb < 7   - scds, hold  subcutaneous heparin   - consult vasc med after H/H stabilizes for future AC plan  - home meds: MVI, ascorbic acid, ferrous sulfate, warfarin      ID: Afebrile. Leukocytosis improving. Blood cultures x2 1/31 NGTD. Started on Cipro, Vanc, Flagyl 1/31. UA negative 2/1. MRSA screen negative 2/1.  - discontinue cipro and flagyl   - follow up cultures   - ongoing infection surveillance     Endocrine: No history of endocrinopathies. Adequate glycemic control  -Blood glucose goal <180  - SSI #1  - q4h BG checks     Lines, Tubes, and Drains:  Hernandez Catheter (2/1/24)    PIV x2 (20g)     Code Status: Full code     Family:  and sister in-law updated at bedside today     Disposition: Continue ICU care for now. Anticipate RNF within the next 24 - 48 hr pending stable hgb.      Edilberto Hale MD  Anesthesiology, PGY-1  SICU Phone [phone]

## 2024-02-04 LAB
ALBUMIN SERPL BCP-MCNC: 3 G/DL (ref 3.4–5)
ALBUMIN SERPL BCP-MCNC: 3 G/DL (ref 3.4–5)
ANION GAP SERPL CALC-SCNC: 11 MMOL/L (ref 10–20)
ANION GAP SERPL CALC-SCNC: 15 MMOL/L (ref 10–20)
APTT PPP: 28 SECONDS (ref 27–38)
APTT PPP: 29 SECONDS (ref 27–38)
BACTERIA BLD CULT: NORMAL
BACTERIA BLD CULT: NORMAL
BUN SERPL-MCNC: 13 MG/DL (ref 6–23)
BUN SERPL-MCNC: 14 MG/DL (ref 6–23)
CA-I BLD-SCNC: 1.17 MMOL/L (ref 1.1–1.33)
CA-I BLD-SCNC: 1.17 MMOL/L (ref 1.1–1.33)
CALCIUM SERPL-MCNC: 8.3 MG/DL (ref 8.6–10.6)
CALCIUM SERPL-MCNC: 8.4 MG/DL (ref 8.6–10.6)
CHLORIDE SERPL-SCNC: 103 MMOL/L (ref 98–107)
CHLORIDE SERPL-SCNC: 105 MMOL/L (ref 98–107)
CO2 SERPL-SCNC: 25 MMOL/L (ref 21–32)
CO2 SERPL-SCNC: 26 MMOL/L (ref 21–32)
CREAT SERPL-MCNC: 0.6 MG/DL (ref 0.5–1.05)
CREAT SERPL-MCNC: 0.7 MG/DL (ref 0.5–1.05)
EGFRCR SERPLBLD CKD-EPI 2021: >90 ML/MIN/1.73M*2
EGFRCR SERPLBLD CKD-EPI 2021: >90 ML/MIN/1.73M*2
ERYTHROCYTE [DISTWIDTH] IN BLOOD BY AUTOMATED COUNT: 16.4 % (ref 11.5–14.5)
ERYTHROCYTE [DISTWIDTH] IN BLOOD BY AUTOMATED COUNT: 17.2 % (ref 11.5–14.5)
ERYTHROCYTE [DISTWIDTH] IN BLOOD BY AUTOMATED COUNT: 17.2 % (ref 11.5–14.5)
FIBRINOGEN PPP-MCNC: 425 MG/DL (ref 200–400)
FIBRINOGEN PPP-MCNC: 500 MG/DL (ref 200–400)
GLUCOSE BLD MANUAL STRIP-MCNC: 103 MG/DL (ref 74–99)
GLUCOSE BLD MANUAL STRIP-MCNC: 128 MG/DL (ref 74–99)
GLUCOSE BLD MANUAL STRIP-MCNC: 96 MG/DL (ref 74–99)
GLUCOSE SERPL-MCNC: 103 MG/DL (ref 74–99)
GLUCOSE SERPL-MCNC: 62 MG/DL (ref 74–99)
HCT VFR BLD AUTO: 23.9 % (ref 36–46)
HCT VFR BLD AUTO: 25.5 % (ref 36–46)
HCT VFR BLD AUTO: 26.5 % (ref 36–46)
HGB BLD-MCNC: 8.2 G/DL (ref 12–16)
HGB BLD-MCNC: 8.5 G/DL (ref 12–16)
HGB BLD-MCNC: 8.5 G/DL (ref 12–16)
INR PPP: 1 (ref 0.9–1.1)
INR PPP: 1 (ref 0.9–1.1)
MAGNESIUM SERPL-MCNC: 2.12 MG/DL (ref 1.6–2.4)
MAGNESIUM SERPL-MCNC: 2.24 MG/DL (ref 1.6–2.4)
MCH RBC QN AUTO: 26.9 PG (ref 26–34)
MCH RBC QN AUTO: 27.2 PG (ref 26–34)
MCH RBC QN AUTO: 28.3 PG (ref 26–34)
MCHC RBC AUTO-ENTMCNC: 32.1 G/DL (ref 32–36)
MCHC RBC AUTO-ENTMCNC: 33.3 G/DL (ref 32–36)
MCHC RBC AUTO-ENTMCNC: 34.3 G/DL (ref 32–36)
MCV RBC AUTO: 79 FL (ref 80–100)
MCV RBC AUTO: 84 FL (ref 80–100)
MCV RBC AUTO: 85 FL (ref 80–100)
NRBC BLD-RTO: 0.7 /100 WBCS (ref 0–0)
NRBC BLD-RTO: 1 /100 WBCS (ref 0–0)
NRBC BLD-RTO: 1.1 /100 WBCS (ref 0–0)
PHOSPHATE SERPL-MCNC: 3.6 MG/DL (ref 2.5–4.9)
PHOSPHATE SERPL-MCNC: 3.7 MG/DL (ref 2.5–4.9)
PLATELET # BLD AUTO: 230 X10*3/UL (ref 150–450)
PLATELET # BLD AUTO: 236 X10*3/UL (ref 150–450)
PLATELET # BLD AUTO: 255 X10*3/UL (ref 150–450)
POTASSIUM SERPL-SCNC: 3.8 MMOL/L (ref 3.5–5.3)
POTASSIUM SERPL-SCNC: 3.9 MMOL/L (ref 3.5–5.3)
PROTHROMBIN TIME: 10.9 SECONDS (ref 9.8–12.8)
PROTHROMBIN TIME: 11.4 SECONDS (ref 9.8–12.8)
RBC # BLD AUTO: 3 X10*6/UL (ref 4–5.2)
RBC # BLD AUTO: 3.01 X10*6/UL (ref 4–5.2)
RBC # BLD AUTO: 3.16 X10*6/UL (ref 4–5.2)
SODIUM SERPL-SCNC: 138 MMOL/L (ref 136–145)
SODIUM SERPL-SCNC: 139 MMOL/L (ref 136–145)
WBC # BLD AUTO: 14.9 X10*3/UL (ref 4.4–11.3)
WBC # BLD AUTO: 15.3 X10*3/UL (ref 4.4–11.3)
WBC # BLD AUTO: 16 X10*3/UL (ref 4.4–11.3)

## 2024-02-04 PROCEDURE — 36415 COLL VENOUS BLD VENIPUNCTURE: CPT | Performed by: NURSE PRACTITIONER

## 2024-02-04 PROCEDURE — 2500000004 HC RX 250 GENERAL PHARMACY W/ HCPCS (ALT 636 FOR OP/ED)

## 2024-02-04 PROCEDURE — 99232 SBSQ HOSP IP/OBS MODERATE 35: CPT

## 2024-02-04 PROCEDURE — 2500000001 HC RX 250 WO HCPCS SELF ADMINISTERED DRUGS (ALT 637 FOR MEDICARE OP): Performed by: NURSE PRACTITIONER

## 2024-02-04 PROCEDURE — 85610 PROTHROMBIN TIME: CPT | Performed by: NURSE PRACTITIONER

## 2024-02-04 PROCEDURE — 85027 COMPLETE CBC AUTOMATED: CPT | Performed by: NURSE PRACTITIONER

## 2024-02-04 PROCEDURE — 99232 SBSQ HOSP IP/OBS MODERATE 35: CPT | Performed by: OBSTETRICS & GYNECOLOGY

## 2024-02-04 PROCEDURE — 80069 RENAL FUNCTION PANEL: CPT | Performed by: NURSE PRACTITIONER

## 2024-02-04 PROCEDURE — 2500000001 HC RX 250 WO HCPCS SELF ADMINISTERED DRUGS (ALT 637 FOR MEDICARE OP)

## 2024-02-04 PROCEDURE — 83735 ASSAY OF MAGNESIUM: CPT | Performed by: NURSE PRACTITIONER

## 2024-02-04 PROCEDURE — 1210000001 HC SEMI-PRIVATE ROOM DAILY

## 2024-02-04 PROCEDURE — 85384 FIBRINOGEN ACTIVITY: CPT | Performed by: NURSE PRACTITIONER

## 2024-02-04 PROCEDURE — 82947 ASSAY GLUCOSE BLOOD QUANT: CPT

## 2024-02-04 PROCEDURE — 82330 ASSAY OF CALCIUM: CPT | Performed by: NURSE PRACTITIONER

## 2024-02-04 RX ORDER — CIPROFLOXACIN 2 MG/ML
400 INJECTION, SOLUTION INTRAVENOUS EVERY 12 HOURS
Status: DISCONTINUED | OUTPATIENT
Start: 2024-02-04 | End: 2024-02-05

## 2024-02-04 RX ORDER — ONDANSETRON 4 MG/1
4 TABLET, FILM COATED ORAL EVERY 6 HOURS PRN
Status: DISCONTINUED | OUTPATIENT
Start: 2024-02-04 | End: 2024-02-07 | Stop reason: HOSPADM

## 2024-02-04 RX ORDER — METRONIDAZOLE 500 MG/100ML
500 INJECTION, SOLUTION INTRAVENOUS EVERY 8 HOURS
Status: DISCONTINUED | OUTPATIENT
Start: 2024-02-04 | End: 2024-02-05

## 2024-02-04 RX ORDER — OXYCODONE HYDROCHLORIDE 5 MG/1
5 TABLET ORAL EVERY 4 HOURS PRN
Status: DISCONTINUED | OUTPATIENT
Start: 2024-02-04 | End: 2024-02-07 | Stop reason: HOSPADM

## 2024-02-04 RX ORDER — AMOXICILLIN 250 MG
2 CAPSULE ORAL NIGHTLY PRN
Status: DISCONTINUED | OUTPATIENT
Start: 2024-02-04 | End: 2024-02-07 | Stop reason: HOSPADM

## 2024-02-04 RX ORDER — OXYCODONE HYDROCHLORIDE 5 MG/1
10 TABLET ORAL EVERY 4 HOURS PRN
Status: DISCONTINUED | OUTPATIENT
Start: 2024-02-04 | End: 2024-02-07 | Stop reason: HOSPADM

## 2024-02-04 RX ORDER — ACETAMINOPHEN 325 MG/1
650 TABLET ORAL EVERY 4 HOURS PRN
Status: DISCONTINUED | OUTPATIENT
Start: 2024-02-04 | End: 2024-02-07 | Stop reason: HOSPADM

## 2024-02-04 RX ADMIN — METRONIDAZOLE 500 MG: 500 INJECTION, SOLUTION INTRAVENOUS at 16:06

## 2024-02-04 RX ADMIN — OXYCODONE HYDROCHLORIDE 10 MG: 5 TABLET ORAL at 08:19

## 2024-02-04 RX ADMIN — METRONIDAZOLE 500 MG: 500 INJECTION, SOLUTION INTRAVENOUS at 23:49

## 2024-02-04 RX ADMIN — ACETAMINOPHEN 650 MG: 325 TABLET ORAL at 09:35

## 2024-02-04 RX ADMIN — OXYCODONE HYDROCHLORIDE 10 MG: 5 TABLET ORAL at 20:50

## 2024-02-04 RX ADMIN — POTASSIUM CHLORIDE 20 MEQ: 1.5 POWDER, FOR SOLUTION ORAL at 04:48

## 2024-02-04 RX ADMIN — SENNOSIDES AND DOCUSATE SODIUM 2 TABLET: 8.6; 5 TABLET ORAL at 08:19

## 2024-02-04 RX ADMIN — CIPROFLOXACIN 400 MG: 400 INJECTION, SOLUTION INTRAVENOUS at 20:50

## 2024-02-04 RX ADMIN — ACETAMINOPHEN 650 MG: 325 TABLET ORAL at 04:48

## 2024-02-04 RX ADMIN — OXYCODONE HYDROCHLORIDE 10 MG: 5 TABLET ORAL at 16:04

## 2024-02-04 RX ADMIN — OXYCODONE HYDROCHLORIDE 5 MG: 5 TABLET ORAL at 12:35

## 2024-02-04 RX ADMIN — OXYCODONE HYDROCHLORIDE 10 MG: 5 TABLET ORAL at 02:41

## 2024-02-04 ASSESSMENT — PAIN SCALES - GENERAL
PAINLEVEL_OUTOF10: 8
PAINLEVEL_OUTOF10: 0 - NO PAIN
PAINLEVEL_OUTOF10: 10 - WORST POSSIBLE PAIN
PAINLEVEL_OUTOF10: 7
PAINLEVEL_OUTOF10: 5 - MODERATE PAIN
PAINLEVEL_OUTOF10: 10 - WORST POSSIBLE PAIN
PAINLEVEL_OUTOF10: 7
PAINLEVEL_OUTOF10: 6
PAINLEVEL_OUTOF10: 0 - NO PAIN
PAINLEVEL_OUTOF10: 4
PAINLEVEL_OUTOF10: 8
PAINLEVEL_OUTOF10: 6

## 2024-02-04 ASSESSMENT — PAIN - FUNCTIONAL ASSESSMENT
PAIN_FUNCTIONAL_ASSESSMENT: 0-10

## 2024-02-04 ASSESSMENT — PAIN DESCRIPTION - ORIENTATION: ORIENTATION: LOWER

## 2024-02-04 ASSESSMENT — PAIN DESCRIPTION - LOCATION
LOCATION: ABDOMEN
LOCATION: ABDOMEN

## 2024-02-04 NOTE — PROGRESS NOTES
"Katarina Merino is a 43 y.o. female on day 3 of admission presenting with Hemorrhagic shock (CMS/HCC).    Subjective   Hemodynamics intact, minimal pain today, no complaints. Encouraged IS use at bedside during AM interview, will have patient OOB in chair today.       Objective     Physical Exam  Vitals and nursing note reviewed.   Constitutional:       General: She is not in acute distress.     Appearance: She is obese. She is not ill-appearing.   HENT:      Head: Normocephalic and atraumatic.      Nose: Nose normal.      Mouth/Throat:      Mouth: Mucous membranes are moist.   Eyes:      Pupils: Pupils are equal, round, and reactive to light.   Cardiovascular:      Rate and Rhythm: Normal rate and regular rhythm.      Pulses: Normal pulses.   Pulmonary:      Effort: Pulmonary effort is normal. No respiratory distress.      Breath sounds: Normal breath sounds. No stridor. No wheezing, rhonchi or rales.      Comments: 2L NC  Abdominal:      General: Abdomen is protuberant. Bowel sounds are normal.      Palpations: Abdomen is soft.      Tenderness: There is abdominal tenderness in the right lower quadrant and left lower quadrant. There is no guarding.   Genitourinary:     Comments: Hernandez with clear yellow urine  Musculoskeletal:      Cervical back: Normal range of motion.   Skin:     General: Skin is warm and dry.      Capillary Refill: Capillary refill takes less than 2 seconds.   Neurological:      General: No focal deficit present.      Mental Status: She is alert and oriented to person, place, and time.   Psychiatric:         Mood and Affect: Mood normal.         Behavior: Behavior normal.         Last Recorded Vitals  Blood pressure 109/58, pulse 84, temperature 35.7 °C (96.3 °F), temperature source Temporal, resp. rate 14, height 1.727 m (5' 8\"), weight 147 kg (324 lb), last menstrual period 01/07/2024, SpO2 94 %, not currently breastfeeding.    Heart Rate:  []   Temp:  [35.7 °C (96.3 °F)-36.7 °C (98.1 " °F)]   Resp:  [14-32]   BP: ()/(58-88)   Weight:  [147 kg (324 lb)]   SpO2:  [88 %-100 %]    Intake/Output last 3 Shifts:  I/O last 3 completed shifts:  In: 1672.5 (11.4 mL/kg) [P.O.:520; I.V.:802.5 (5.5 mL/kg); Blood:350]  Out: 3822 (26 mL/kg) [Urine:3822 (0.7 mL/kg/hr)]  Weight: 147 kg       Intake/Output Summary (Last 24 hours) at 2/4/2024 0704  Last data filed at 2/4/2024 0400  Gross per 24 hour   Intake 952.5 ml   Output 2520 ml   Net -1567.5 ml          Relevant Results  Scheduled medications  acetaminophen, 650 mg, oral, q6h  insulin lispro, 0-5 Units, subcutaneous, q4h  sennosides-docusate sodium, 2 tablet, oral, BID      Continuous medications       PRN medications  PRN medications: calcium gluconate, calcium gluconate, dextrose 10 % in water (D10W), dextrose, glucagon, HYDROmorphone, magnesium sulfate, magnesium sulfate, oxyCODONE, oxyCODONE, potassium chloride CR **OR** potassium chloride, potassium chloride CR **OR** potassium chloride, potassium chloride     Results for orders placed or performed during the hospital encounter of 02/01/24 (from the past 24 hour(s))   POCT GLUCOSE   Result Value Ref Range    POCT Glucose 100 (H) 74 - 99 mg/dL   CBC   Result Value Ref Range    WBC 17.0 (H) 4.4 - 11.3 x10*3/uL    nRBC 0.9 (H) 0.0 - 0.0 /100 WBCs    RBC 2.67 (L) 4.00 - 5.20 x10*6/uL    Hemoglobin 7.1 (L) 12.0 - 16.0 g/dL    Hematocrit 20.9 (L) 36.0 - 46.0 %    MCV 78 (L) 80 - 100 fL    MCH 26.6 26.0 - 34.0 pg    MCHC 34.0 32.0 - 36.0 g/dL    RDW 16.7 (H) 11.5 - 14.5 %    Platelets 226 150 - 450 x10*3/uL   POCT GLUCOSE   Result Value Ref Range    POCT Glucose 115 (H) 74 - 99 mg/dL   Fibrinogen   Result Value Ref Range    Fibrinogen 478 (H) 200 - 400 mg/dL   POCT GLUCOSE   Result Value Ref Range    POCT Glucose 104 (H) 74 - 99 mg/dL   CBC   Result Value Ref Range    WBC 16.1 (H) 4.4 - 11.3 x10*3/uL    nRBC 1.3 (H) 0.0 - 0.0 /100 WBCs    RBC 2.58 (L) 4.00 - 5.20 x10*6/uL    Hemoglobin 6.9 (L) 12.0 - 16.0  g/dL    Hematocrit 20.4 (L) 36.0 - 46.0 %    MCV 79 (L) 80 - 100 fL    MCH 26.7 26.0 - 34.0 pg    MCHC 33.8 32.0 - 36.0 g/dL    RDW 16.4 (H) 11.5 - 14.5 %    Platelets 223 150 - 450 x10*3/uL   Prepare RBC: 1 Units   Result Value Ref Range    PRODUCT CODE O2409P56     Unit Number [id number]     Unit ABO O     Unit RH POS     XM INTEP COMP     Dispense Status TR     Blood Expiration Date February 13, 2024 23:59 EST     PRODUCT BLOOD TYPE 5100     UNIT VOLUME 350    POCT GLUCOSE   Result Value Ref Range    POCT Glucose 125 (H) 74 - 99 mg/dL   POCT GLUCOSE   Result Value Ref Range    POCT Glucose 102 (H) 74 - 99 mg/dL   POCT GLUCOSE   Result Value Ref Range    POCT Glucose 103 (H) 74 - 99 mg/dL   Magnesium   Result Value Ref Range    Magnesium 2.12 1.60 - 2.40 mg/dL   Calcium, Ionized   Result Value Ref Range    POCT Calcium, Ionized 1.17 1.1 - 1.33 mmol/L   Renal Function Panel   Result Value Ref Range    Glucose 103 (H) 74 - 99 mg/dL    Sodium 138 136 - 145 mmol/L    Potassium 3.9 3.5 - 5.3 mmol/L    Chloride 105 98 - 107 mmol/L    Bicarbonate 26 21 - 32 mmol/L    Anion Gap 11 10 - 20 mmol/L    Urea Nitrogen 13 6 - 23 mg/dL    Creatinine 0.60 0.50 - 1.05 mg/dL    eGFR >90 >60 mL/min/1.73m*2    Calcium 8.3 (L) 8.6 - 10.6 mg/dL    Phosphorus 3.6 2.5 - 4.9 mg/dL    Albumin 3.0 (L) 3.4 - 5.0 g/dL   Coagulation Screen   Result Value Ref Range    Protime 11.4 9.8 - 12.8 seconds    INR 1.0 0.9 - 1.1    aPTT 29 27 - 38 seconds   CBC   Result Value Ref Range    WBC 14.9 (H) 4.4 - 11.3 x10*3/uL    nRBC 1.0 (H) 0.0 - 0.0 /100 WBCs    RBC 3.01 (L) 4.00 - 5.20 x10*6/uL    Hemoglobin 8.2 (L) 12.0 - 16.0 g/dL    Hematocrit 23.9 (L) 36.0 - 46.0 %    MCV 79 (L) 80 - 100 fL    MCH 27.2 26.0 - 34.0 pg    MCHC 34.3 32.0 - 36.0 g/dL    RDW 16.4 (H) 11.5 - 14.5 %    Platelets 230 150 - 450 x10*3/uL   Fibrinogen   Result Value Ref Range    Fibrinogen 500 (H) 200 - 400 mg/dL       Assessment/Plan   Patient presents from OSH to [facility]  SICU for further management of hemorrhagic shock.     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  Patient is s/p robotic total hysterectomy, LSO, right salpingectomy on 1/24/24 with Dr. Naidu.  In the ED patient was found to be significantly anemic at 5.7 (baseline 11).  She was started on transfusion of 2 units PRBCs as well as 2L IV NS.  PT 26.7, INR 2.3.  Patient was given Kcentra and vitamin K.  Leukocytosis of 24.  She was given Cipro, Flagyl, and vancomycin for antibiotic coverage.  CT chest abdomen pelvis remarkable for a heterogenous lobulated collection extending from the pelvis to the right retroperitoneum measuring approximately 17 cm concerning for active extravasation.  There is also a small right intramuscular transverse abdominal hematoma measuring 1 cm.  Interventional radiology was consulted and patient was taken for urgent pelvic angiogram with bilateral uterine artery Gelfoam embolization .  She was admitted to the intensive care unit for further hemodynamic monitoring.  Since admission to the ICU patient has been hemodynamically stable, no pressor requirements.  Repeat a.m. hemoglobin 6.6, patient was transfused an additional 2 unit of PRBCs.  Gynecology consulted who recommends transfer to [facility] for possible surgical intervention.      Neuro: History of hemorrhagic CVA in 2019 without deficits. Syncopal episode on 1/31 at house, found by . Currently A&O x3. Acute pain.  - ongoing neuro and pain assessments  - prn hydromorphone -> convert to oral meds  - PT/OT   - home meds: oxycodone-acetaminophen 5-325mg q6h prn     CV: History of HTN and HLD. Baseline -130/70-80s per chart review. No TTE on file. High sensitivity troponin 74, 63 at OSH. Lactate  WNL. Arrived to SICU hemodynamically intact without vasoactive support. Tachycardia improved after IVF bolus and RBC transfusions.  - goal map range 65-85  - volume/products as indicated  - consider TTE if becomes clinically unstable  - hold antihypertensives  - home meds: Norvasc 5mg, Lipitor 10mg, Labetalol 300mg BID, hydrochlorothiazide 25mg     PULM: No history of pulmonary disease. No active distress. Arrived to SICU on room air, currently on 2L NC.  - cxr prn daily  - goal SpO2 > 92%   - IS hourly while awake     GI: No history of GI disease. Acute abdominal pain since recent surgery. Last BM 3 days ago  - NPO except for meds  - PPI ppx  - serial abdominal exams  - bowel regimen     GYN: History of Endometriosis and Leiomyoma. Recent ANTHONY-BSO 1/24/24. CT C/A/P showing heterogenous lobulated collection extending from pelvis to right retroperitoneum measuring 17cm likely related to recent hysterectomy. Probable small right intramuscular transverse abdominis hematoma measuring 1cm  -await OB/Gyn recs for today -> no OR planned, continue to watch clinically and trend cbc q6h     : No history of renal disease. Baseline creatinine 0.93. Hyponatremia resolved. Borderline hyperkalemia resolved. Rose cath inserted 2/1/24  - RFP daily and prn  - hourly I&Os  - remove rose  - replete electrolytes judiciously      HEME: Baseline H/H 11/37. History of Iron Deficiency Anemia. History of right leg DVT x2 (on Coumadin). Coumadin has been stopped since 1/13/24, Lovenox injections until her procedure. Restarted Coumadin on 1/27. Hemoglobin 5.7 on admit to ED s/p 2 units pRBC with appropriate increment to 7.4. INR therapeutic on admission, decreased to 1.1 after Kcentra and Vitamin K. IR consulted, patient taken for pelvic angioembolization s/p bilateral uterine artery Gelfoam embolization overnight 1/31-2/1.   - switch CBC to Q12H  - TEG results without intervention indicated  - coags and fibrinogen daily  - keep T&S active  (2/1)  - Received 1u pRBCs overnight, hgb 6.9 -> 8.2. Likely slight decrement to 6.9 caused by iatrogenic anemia from frequent lab draws.  - Transfuse if hgb < 7   - scds, hold subcutaneous heparin   - consult vasc med after H/H stabilizes for future AC plan  - home meds: MVI, ascorbic acid, ferrous sulfate, warfarin      ID: Afebrile. Leukocytosis improving. Blood cultures x2 1/31 NGTD. Started on Cipro, Vanc, Flagyl 1/31. UA negative 2/1. MRSA screen negative 2/1.  - discontinue cipro and flagyl   - follow up cultures   - ongoing infection surveillance     Endocrine: No history of endocrinopathies. Adequate glycemic control  -Blood glucose goal <180  - SSI #1  - q4h BG checks     Lines, Tubes, and Drains:  Hernandez Catheter (2/1/24)    PIV x2 (20g)     Code Status: Full code     Family:  and sister in-law at bedside today     Disposition: Floor, discussed with Dr. Leiva.     Edilberto Hale MD  Anesthesiology, PGY-1  SICU Phone [phone]

## 2024-02-04 NOTE — CARE PLAN
Problem: Fall/Injury  Goal: Not fall by end of shift  Outcome: Progressing  Goal: Be free from injury by end of the shift  Outcome: Progressing  Goal: Verbalize understanding of personal risk factors for fall in the hospital  Outcome: Progressing  Goal: Verbalize understanding of risk factor reduction measures to prevent injury from fall in the home  Outcome: Progressing  Goal: Use assistive devices by end of the shift  Outcome: Progressing  Goal: Pace activities to prevent fatigue by end of the shift  Outcome: Progressing     Problem: Pain  Goal: My pain/discomfort is manageable  Outcome: Progressing     Problem: Safety  Goal: Patient will be injury free during hospitalization  Outcome: Progressing  Goal: I will remain free of falls  Outcome: Progressing     Problem: Daily Care  Goal: Daily care needs are met  Outcome: Progressing     Problem: Psychosocial Needs  Goal: Demonstrates ability to cope with hospitalization/illness  Outcome: Progressing  Goal: Collaborate with me, my family, and caregiver to identify my specific goals  Outcome: Progressing     Problem: Discharge Barriers  Goal: My discharge needs are met  Outcome: Progressing     Problem: Skin  Goal: Decreased wound size/increased tissue granulation at next dressing change  Outcome: Progressing  Goal: Participates in plan/prevention/treatment measures  Outcome: Progressing  Goal: Prevent/manage excess moisture  Outcome: Progressing  Goal: Prevent/minimize sheer/friction injuries  Outcome: Progressing  Goal: Promote/optimize nutrition  Outcome: Progressing  Goal: Promote skin healing  Outcome: Progressing     Problem: Pain  Goal: STG - Patients pain is managed to allow active participation in daily activities  Outcome: Progressing   The patient's goals for the shift include      The clinical goals for the shift include Adequate pain control    Pt's pain was adequately controlled with use of ordered medications and heat packs.

## 2024-02-04 NOTE — CARE PLAN
The patient's goals for the shift include      The clinical goals for the shift include Pt. will state pain below 2/10 throughout shift.      Problem: Pain  Goal: My pain/discomfort is manageable  Flowsheets (Taken 2/4/2024 1017)  Resident's pain/discomfort is manageable:   Include resident/family/caregiver in decisions related to pain management   Administer pain medication prior to activities that may trigger pain   Offer non-pharmacological pain management interventions   Identify and avoid pain triggers     Problem: Safety  Goal: I will remain free of falls  Flowsheets (Taken 2/4/2024 1017)  Resident will remain free of falls:   Assist with toileting as orderd   Maintain bed at position as ordered (chair height, low bed)   Assess and monitor medications that may increase fall risk   Visual checks per facility policy   Use gait belt for all transfers     Problem: Daily Care  Goal: Daily care needs are met  Flowsheets (Taken 2/4/2024 1017)  Daily care needs are met:   Provide mouth care   Assess skin integrity/risk for skin breakdown   Include patient/family/caregiver in decisions related to daily care     Problem: Psychosocial Needs  Goal: Demonstrates ability to cope with hospitalization/illness  Flowsheets (Taken 2/4/2024 1017)  Demonstrates ability to cope with hospitalization/illness:   Assist resident to identify and practice own strengths and abilities   Encourage verbalization of feelings/concerns/expectations   Provide low-stimulation environment as needed   Encourage resident to set and complete small goals for self     Problem: Discharge Barriers  Goal: My discharge needs are met  Flowsheets (Taken 2/4/2024 1017)  Resident's discharge needs are met:   Identify potential discharge barriers on admission and throughout stay   Involve resident/family/caregiver in discharge planning process     Problem: Skin  Goal: Decreased wound size/increased tissue granulation at next dressing change  Flowsheets (Taken  2/4/2024 1017)  Decreased wound size/increased tissue granulation at next dressing change:   Promote sleep for wound healing   Protective dressings over bony prominences   Utilize specialty bed per algorithm  Goal: Participates in plan/prevention/treatment measures  Flowsheets (Taken 2/4/2024 1017)  Participates in plan/prevention/treatment measures:   Discuss with provider PT/OT consult   Elevate heels   Increase activity/out of bed for meals  Goal: Prevent/manage excess moisture  Flowsheets (Taken 2/4/2024 1017)  Prevent/manage excess moisture:   Cleanse incontinence/protect with barrier cream   Monitor for/manage infection if present   Follow provider orders for dressing changes   Use wicking fabric (obtain order)   Moisturize dry skin  Goal: Prevent/minimize sheer/friction injuries  Flowsheets (Taken 2/4/2024 1017)  Prevent/minimize sheer/friction injuries:   Complete micro-shifts as needed if patient unable. Adjust patient position to relieve pressure points, not a full turn   Increase activity/out of bed for meals   Use pull sheet   HOB 30 degrees or less   Turn/reposition every 2 hours/use positioning/transfer devices   Utilize specialty bed per algorithm  Goal: Promote/optimize nutrition  Flowsheets (Taken 2/4/2024 1017)  Promote/optimize nutrition:   Assist with feeding   Monitor/record intake including meals   Consume > 50% meals/supplements   Offer water/supplements/favorite foods   Discuss with provider if NPO > 2 days   Reassess MST if dietician not consulted

## 2024-02-04 NOTE — PROGRESS NOTES
Assessment/Plan   Pt is 44 yo with known 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 s/p robotic total hysterectomy, LSO, right salpingectomy for AUB-L transferred to [facility] ICU in the s/o 17 cm retroperitoneal hematoma s/p bilateral UAE 2/1 with concern for continued intraabdominal bleeding.     Retroperitoneal hematoma  - CT (1/31): 17 cm pelvic hematoma extending into retroperitoneum  - s/p IR bilateral UAE (2/1)  - Currently hemodynamically normal, afebrile  - Abdominal exam benign  - Hgb downtrended (2/2) to 6.7 with 2 u pRBCs transfused. Stable in 7s during the day on 2/3-> downtrend to 6.9 overnight. Additional 1u pRBC transfused -> most recently, Hgb 8.2. Continue to trend hemoglobin with goal Hgb > 7.   - No indication for surgical intervention at this time given stability of vital signs, and good urine output despite downtrend in hemoglobin. High threshold to operate given location of hematoma and reassuring clinical status indicates hematoma may have stabilized s/p UAE.  - Continue cipro/flagyl for infection ppx  - Hold AC. For vascular medicine consult to restart AC when stable from bleeding standpoint.  - Ok for clear liquids  - Discussed with SICU team that if patient remains hemodynamically stable with stable Hgb she can be transferred to main floor on [facility] [address]      discussed with Dr. Pope Alanna Vázquez MD  Gyn Pager [phone]  Gyn Phone [phone] 5P - 6:30 A; [phone] 6:30 A - 5 P     Subjective   Pt is doing okay. Has some lower abdomen soreness and near incision site. Believes to have no vaginal bleeding. Tolerating PO intake. Passing flatus. Denies N/V, SOB, and CP.     Objective  Last Vitals  Temp Pulse Resp BP MAP O2 Sat   36.2 °C (97.2 °F) 105 19 (!) 116/95  95 %      Physical Examination  Physical Exam  Constitutional:       General: She is not in acute distress.     Appearance: Normal appearance.   HENT:      Head: Normocephalic and atraumatic.      Nose: Nose normal.   Eyes:      General: No scleral icterus.     Extraocular  Movements: Extraocular movements intact.   Cardiovascular:      Rate and Rhythm: Normal rate.   Pulmonary:      Effort: Pulmonary effort is normal.   Abdominal:      Palpations: Abdomen is soft.      Comments: Incision clean/dry/intact with dermabond, tympanic, mild diffuse tenderness, no rebound, no guarding   Musculoskeletal:         General: Normal range of motion.   Skin:     General: Skin is warm and dry.   Neurological:      Mental Status: She is alert.      Cranial Nerves: No cranial nerve deficit.   Psychiatric:         Mood and Affect: Mood normal.   : rose in place draining yellow-orange urine     Lab Review  Results for orders placed or performed during the hospital encounter of 02/01/24 (from the past 24 hour(s))   POCT GLUCOSE   Result Value Ref Range    POCT Glucose 100 (H) 74 - 99 mg/dL   CBC   Result Value Ref Range    WBC 17.0 (H) 4.4 - 11.3 x10*3/uL    nRBC 0.9 (H) 0.0 - 0.0 /100 WBCs    RBC 2.67 (L) 4.00 - 5.20 x10*6/uL    Hemoglobin 7.1 (L) 12.0 - 16.0 g/dL    Hematocrit 20.9 (L) 36.0 - 46.0 %    MCV 78 (L) 80 - 100 fL    MCH 26.6 26.0 - 34.0 pg    MCHC 34.0 32.0 - 36.0 g/dL    RDW 16.7 (H) 11.5 - 14.5 %    Platelets 226 150 - 450 x10*3/uL   POCT GLUCOSE   Result Value Ref Range    POCT Glucose 115 (H) 74 - 99 mg/dL   Fibrinogen   Result Value Ref Range    Fibrinogen 478 (H) 200 - 400 mg/dL   POCT GLUCOSE   Result Value Ref Range    POCT Glucose 104 (H) 74 - 99 mg/dL   CBC   Result Value Ref Range    WBC 16.1 (H) 4.4 - 11.3 x10*3/uL    nRBC 1.3 (H) 0.0 - 0.0 /100 WBCs    RBC 2.58 (L) 4.00 - 5.20 x10*6/uL    Hemoglobin 6.9 (L) 12.0 - 16.0 g/dL    Hematocrit 20.4 (L) 36.0 - 46.0 %    MCV 79 (L) 80 - 100 fL    MCH 26.7 26.0 - 34.0 pg    MCHC 33.8 32.0 - 36.0 g/dL    RDW 16.4 (H) 11.5 - 14.5 %    Platelets 223 150 - 450 x10*3/uL   Prepare RBC: 1 Units   Result Value Ref Range    PRODUCT CODE [id number]     Unit Number [id number]     Unit ABO O     Unit RH POS     XM INTEP COMP     Dispense  Status TR     Blood Expiration Date February 13, 2024 23:59 EST     PRODUCT BLOOD TYPE 5100     UNIT VOLUME 350    POCT GLUCOSE   Result Value Ref Range    POCT Glucose 125 (H) 74 - 99 mg/dL   POCT GLUCOSE   Result Value Ref Range    POCT Glucose 102 (H) 74 - 99 mg/dL   POCT GLUCOSE   Result Value Ref Range    POCT Glucose 103 (H) 74 - 99 mg/dL   Magnesium   Result Value Ref Range    Magnesium 2.12 1.60 - 2.40 mg/dL   Calcium, Ionized   Result Value Ref Range    POCT Calcium, Ionized 1.17 1.1 - 1.33 mmol/L   Renal Function Panel   Result Value Ref Range    Glucose 103 (H) 74 - 99 mg/dL    Sodium 138 136 - 145 mmol/L    Potassium 3.9 3.5 - 5.3 mmol/L    Chloride 105 98 - 107 mmol/L    Bicarbonate 26 21 - 32 mmol/L    Anion Gap 11 10 - 20 mmol/L    Urea Nitrogen 13 6 - 23 mg/dL    Creatinine 0.60 0.50 - 1.05 mg/dL    eGFR >90 >60 mL/min/1.73m*2    Calcium 8.3 (L) 8.6 - 10.6 mg/dL    Phosphorus 3.6 2.5 - 4.9 mg/dL    Albumin 3.0 (L) 3.4 - 5.0 g/dL   Coagulation Screen   Result Value Ref Range    Protime 11.4 9.8 - 12.8 seconds    INR 1.0 0.9 - 1.1    aPTT 29 27 - 38 seconds   CBC   Result Value Ref Range    WBC 14.9 (H) 4.4 - 11.3 x10*3/uL    nRBC 1.0 (H) 0.0 - 0.0 /100 WBCs    RBC 3.01 (L) 4.00 - 5.20 x10*6/uL    Hemoglobin 8.2 (L) 12.0 - 16.0 g/dL    Hematocrit 23.9 (L) 36.0 - 46.0 %    MCV 79 (L) 80 - 100 fL    MCH 27.2 26.0 - 34.0 pg    MCHC 34.3 32.0 - 36.0 g/dL    RDW 16.4 (H) 11.5 - 14.5 %    Platelets 230 150 - 450 x10*3/uL   Fibrinogen   Result Value Ref Range    Fibrinogen 500 (H) 200 - 400 mg/dL

## 2024-02-05 LAB
ANION GAP SERPL CALC-SCNC: 13 MMOL/L (ref 10–20)
BUN SERPL-MCNC: 14 MG/DL (ref 6–23)
CALCIUM SERPL-MCNC: 9 MG/DL (ref 8.6–10.6)
CHLORIDE SERPL-SCNC: 101 MMOL/L (ref 98–107)
CO2 SERPL-SCNC: 23 MMOL/L (ref 21–32)
CREAT SERPL-MCNC: 0.77 MG/DL (ref 0.5–1.05)
EGFRCR SERPLBLD CKD-EPI 2021: >90 ML/MIN/1.73M*2
ERYTHROCYTE [DISTWIDTH] IN BLOOD BY AUTOMATED COUNT: 17.9 % (ref 11.5–14.5)
GLUCOSE SERPL-MCNC: 104 MG/DL (ref 74–99)
HCT VFR BLD AUTO: 27.1 % (ref 36–46)
HGB BLD-MCNC: 8.2 G/DL (ref 12–16)
MAGNESIUM SERPL-MCNC: 2.17 MG/DL (ref 1.6–2.4)
MCH RBC QN AUTO: 25.9 PG (ref 26–34)
MCHC RBC AUTO-ENTMCNC: 30.3 G/DL (ref 32–36)
MCV RBC AUTO: 86 FL (ref 80–100)
NRBC BLD-RTO: 0.7 /100 WBCS (ref 0–0)
PLATELET # BLD AUTO: 249 X10*3/UL (ref 150–450)
POTASSIUM SERPL-SCNC: 4 MMOL/L (ref 3.5–5.3)
RBC # BLD AUTO: 3.16 X10*6/UL (ref 4–5.2)
SODIUM SERPL-SCNC: 133 MMOL/L (ref 136–145)
WBC # BLD AUTO: 10.6 X10*3/UL (ref 4.4–11.3)

## 2024-02-05 PROCEDURE — 2500000004 HC RX 250 GENERAL PHARMACY W/ HCPCS (ALT 636 FOR OP/ED)

## 2024-02-05 PROCEDURE — 97530 THERAPEUTIC ACTIVITIES: CPT | Mod: GP | Performed by: PHYSICAL THERAPIST

## 2024-02-05 PROCEDURE — 97116 GAIT TRAINING THERAPY: CPT | Mod: GP | Performed by: PHYSICAL THERAPIST

## 2024-02-05 PROCEDURE — 1210000001 HC SEMI-PRIVATE ROOM DAILY

## 2024-02-05 PROCEDURE — 80048 BASIC METABOLIC PNL TOTAL CA: CPT | Performed by: STUDENT IN AN ORGANIZED HEALTH CARE EDUCATION/TRAINING PROGRAM

## 2024-02-05 PROCEDURE — 36415 COLL VENOUS BLD VENIPUNCTURE: CPT | Performed by: STUDENT IN AN ORGANIZED HEALTH CARE EDUCATION/TRAINING PROGRAM

## 2024-02-05 PROCEDURE — 85027 COMPLETE CBC AUTOMATED: CPT | Performed by: STUDENT IN AN ORGANIZED HEALTH CARE EDUCATION/TRAINING PROGRAM

## 2024-02-05 PROCEDURE — 83735 ASSAY OF MAGNESIUM: CPT | Performed by: STUDENT IN AN ORGANIZED HEALTH CARE EDUCATION/TRAINING PROGRAM

## 2024-02-05 PROCEDURE — 2500000001 HC RX 250 WO HCPCS SELF ADMINISTERED DRUGS (ALT 637 FOR MEDICARE OP)

## 2024-02-05 RX ADMIN — OXYCODONE HYDROCHLORIDE 5 MG: 5 TABLET ORAL at 08:40

## 2024-02-05 RX ADMIN — METRONIDAZOLE 500 MG: 500 INJECTION, SOLUTION INTRAVENOUS at 08:29

## 2024-02-05 RX ADMIN — OXYCODONE HYDROCHLORIDE 5 MG: 5 TABLET ORAL at 17:28

## 2024-02-05 RX ADMIN — CIPROFLOXACIN 400 MG: 400 INJECTION, SOLUTION INTRAVENOUS at 09:28

## 2024-02-05 RX ADMIN — OXYCODONE HYDROCHLORIDE 5 MG: 5 TABLET ORAL at 13:07

## 2024-02-05 RX ADMIN — METRONIDAZOLE 500 MG: 500 INJECTION, SOLUTION INTRAVENOUS at 15:06

## 2024-02-05 RX ADMIN — OXYCODONE HYDROCHLORIDE 5 MG: 5 TABLET ORAL at 21:42

## 2024-02-05 RX ADMIN — SENNOSIDES AND DOCUSATE SODIUM 2 TABLET: 8.6; 5 TABLET ORAL at 21:42

## 2024-02-05 ASSESSMENT — COGNITIVE AND FUNCTIONAL STATUS - GENERAL
STANDING UP FROM CHAIR USING ARMS: A LITTLE
HELP NEEDED FOR BATHING: A LITTLE
DAILY ACTIVITIY SCORE: 18
STANDING UP FROM CHAIR USING ARMS: A LITTLE
PERSONAL GROOMING: A LITTLE
WALKING IN HOSPITAL ROOM: A LITTLE
MOVING TO AND FROM BED TO CHAIR: A LITTLE
CLIMB 3 TO 5 STEPS WITH RAILING: A LITTLE
MOBILITY SCORE: 19
TURNING FROM BACK TO SIDE WHILE IN FLAT BAD: A LITTLE
DRESSING REGULAR LOWER BODY CLOTHING: A LITTLE
DRESSING REGULAR UPPER BODY CLOTHING: A LITTLE
MOVING TO AND FROM BED TO CHAIR: A LITTLE
TOILETING: A LITTLE
CLIMB 3 TO 5 STEPS WITH RAILING: A LITTLE
MOBILITY SCORE: 19
EATING MEALS: A LITTLE
WALKING IN HOSPITAL ROOM: A LITTLE
TURNING FROM BACK TO SIDE WHILE IN FLAT BAD: A LITTLE

## 2024-02-05 ASSESSMENT — PAIN SCALES - GENERAL
PAINLEVEL_OUTOF10: 7
PAINLEVEL_OUTOF10: 7
PAINLEVEL_OUTOF10: 5 - MODERATE PAIN
PAINLEVEL_OUTOF10: 3
PAINLEVEL_OUTOF10: 5 - MODERATE PAIN

## 2024-02-05 ASSESSMENT — PAIN DESCRIPTION - DESCRIPTORS
DESCRIPTORS: ACHING
DESCRIPTORS: ACHING;SORE

## 2024-02-05 ASSESSMENT — PAIN - FUNCTIONAL ASSESSMENT: PAIN_FUNCTIONAL_ASSESSMENT: 0-10

## 2024-02-05 NOTE — CARE PLAN
The patient's goals for the shift include get some rest    The clinical goals for the shift include pain control    Over the shift, the patient VS and assessment findings remain stable. Pain well managed. Goals met. Resting comfortably in room; denies needs at this time.

## 2024-02-05 NOTE — PROGRESS NOTES
Assessment/Plan   Pt is 44 yo with known 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 s/p robotic total hysterectomy, LSO, right salpingectomy for AUB-L transferred to [facility] ICU in the s/o 17 cm retroperitoneal hematoma s/p bilateral UAE 2/1 with concern for continued intraabdominal bleeding.     Retroperitoneal hematoma  - CT (1/31): 17 cm pelvic hematoma extending into retroperitoneum  - s/p IR bilateral UAE (2/1)  - Currently hemodynamically normal, afebrile  - Abdominal exam benign  - Hgb stable in 8s  - No indication for surgical intervention at this time given stability of vital signs, and good urine output despite downtrend in hemoglobin. High threshold to operate given location of hematoma and reassuring clinical status indicates hematoma may have stabilized s/p UAE.  - Continue cipro/flagyl for infection ppx  - Consult vascular medicine to restart AC  - PT/OT eval  - Regular diet     Discussed with Dr. Alejandra Medel MD  Gyn Pager [phone]  Gyn Phone [phone] 5P - 6:30 A; [phone] 6:30 A - 5 P     Subjective   Pt is doing okay. Has some lower abdomen soreness and near incision site. Believes to have no vaginal bleeding. Tolerating PO intake. Passing flatus. Denies N/V, SOB, and CP.     Objective  Last Vitals  Temp Pulse Resp BP MAP O2 Sat   36.2 °C (97.2 °F) 105 19 (!) 116/95  95 %      Physical Examination  Physical Exam  Constitutional:       General: She is not in acute distress.     Appearance: Normal appearance.   HENT:      Head: Normocephalic and atraumatic.      Nose: Nose normal.   Eyes:      General: No scleral icterus.     Extraocular Movements: Extraocular movements intact.   Cardiovascular:      Rate and Rhythm: Normal rate.   Pulmonary:      Effort: Pulmonary effort is normal.   Abdominal:      Palpations: Abdomen is soft.      Comments: Incision clean/dry/intact with dermabond, tympanic, mild diffuse tenderness, no rebound, no guarding   Musculoskeletal:         General: Normal range of motion.   Skin:      General: Skin is warm and dry.   Neurological:      Mental Status: She is alert.      Cranial Nerves: No cranial nerve deficit.   Psychiatric:         Mood and Affect: Mood normal.   : rose in place draining yellow-orange urine     Lab Review  Results for orders placed or performed during the hospital encounter of 02/01/24 (from the past 24 hour(s))   Basic metabolic panel   Result Value Ref Range    Glucose 104 (H) 74 - 99 mg/dL    Sodium 133 (L) 136 - 145 mmol/L    Potassium 4.0 3.5 - 5.3 mmol/L    Chloride 101 98 - 107 mmol/L    Bicarbonate 23 21 - 32 mmol/L    Anion Gap 13 10 - 20 mmol/L    Urea Nitrogen 14 6 - 23 mg/dL    Creatinine 0.77 0.50 - 1.05 mg/dL    eGFR >90 >60 mL/min/1.73m*2    Calcium 9.0 8.6 - 10.6 mg/dL   Magnesium   Result Value Ref Range    Magnesium 2.17 1.60 - 2.40 mg/dL   CBC   Result Value Ref Range    WBC 10.6 4.4 - 11.3 x10*3/uL    nRBC 0.7 (H) 0.0 - 0.0 /100 WBCs    RBC 3.16 (L) 4.00 - 5.20 x10*6/uL    Hemoglobin 8.2 (L) 12.0 - 16.0 g/dL    Hematocrit 27.1 (L) 36.0 - 46.0 %    MCV 86 80 - 100 fL    MCH 25.9 (L) 26.0 - 34.0 pg    MCHC 30.3 (L) 32.0 - 36.0 g/dL    RDW 17.9 (H) 11.5 - 14.5 %    Platelets 249 150 - 450 x10*3/uL

## 2024-02-05 NOTE — CARE PLAN
The patient's goals for the shift include walk in the hallway    The clinical goals for the shift include pain control    Over the shift, the patient did make progress toward the following goals. Barriers to progression include none. Recommendations to address these barriers include patient seen by pt today.She has walked in the hallway several times  Today with a wheeled walker.

## 2024-02-05 NOTE — PROGRESS NOTES
Physical Therapy    Physical Therapy Treatment    Patient Name: Katarina Merino  MRN: [id number]  Today's Date: 2/5/2024  Time Calculation  Start Time: 0912  Stop Time: 0945  Time Calculation (min): 33 min       Assessment/Plan   PT Assessment  PT Assessment Results: Decreased strength, Decreased endurance, Decreased mobility, Pain  Rehab Prognosis: Excellent  Evaluation/Treatment Tolerance: Patient limited by fatigue  End of Session Communication: Bedside nurse  Assessment Comment: Pt primarily limited by abdominal pain and generalized weakness/fatigue during PT evaluation. Pt will benefit from continued skilled PT to address above deficits and facilitate return to PLOF.Pt able to tolerate ambulation in the halls today with insistence from MD this morning.  End of Session Patient Position: Bed, 2 rail up  PT Plan  Inpatient/Swing Bed or Outpatient: Inpatient  PT Plan  Treatment/Interventions: Bed mobility, Gait training, Transfer training, Stair training, Balance training, Endurance training, Strengthening, Therapeutic exercise, Therapeutic activity  PT Plan: Skilled PT  PT Frequency: 4 times per week  PT Discharge Recommendations: Low intensity level of continued care  PT Recommended Transfer Status: Assist x1  PT - OK to Discharge: Yes      General Visit Information:   PT  Visit  PT Received On: 02/05/24  General  Reason for Referral: transferred to [facility] ICU in the s/o 17 cm retroperitoneal hematoma s/p bilateral UAE with concern for continued intraabdominal bleeding  Past Medical History Relevant to Rehab: 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 s/p robotic total hysterectomy, LSO, right salpingectomy for AUB-L  Family/Caregiver Present: Yes  Caregiver Feedback: sister present and supportive  Prior to Session Communication: Bedside nurse  Patient Position Received: Bed, 2 rail up  General Comment: Pt pleasant and agreeable, motivated for PT evaluation    Subjective   Precautions:  Precautions  Medical Precautions: Abdominal  precautions  Vital Signs:  Vital Signs  Heart Rate: (!) 113 (123 sitting)  Heart Rate Source: Monitor  BP: (!) 140/90 (154/107 in sitting Rn made aware of increase in BP)  BP Location: Right arm  BP Method: Automatic  Patient Position: Lying    Objective   Pain:  Pain Assessment  Pain Assessment: 0-10  Pain Score: 3  Pain Type: Acute pain  Pain Location: Abdomen  Pain Interventions: Medication (See MAR)  Cognition:  Cognition  Overall Cognitive Status: Within Functional Limits  Postural Control:  Static Sitting Balance  Static Sitting-Balance Support: Feet supported  Static Sitting-Level of Assistance: Contact guard    Activity Tolerance:  Activity Tolerance  Endurance: Tolerates 10 - 20 min exercise with multiple rests  Treatments:  Therapeutic Exercise  Therapeutic Exercise Performed: Yes  Therapeutic Exercise Activity 1: sitting : AP 1 x 10 each B    Therapeutic Activity  Therapeutic Activity Performed: Yes  Therapeutic Activity 1: increase time spent sitting EOB for change of gown and IV repair. pt sat EOB for up to 10 mins with CGA    Bed Mobility 1  Bed Mobility 1: Supine to sitting, Sitting to supine  Level of Assistance 1: Minimum assistance  Bed Mobility Comments 1: HOB raised and scooted feet over    Ambulation/Gait Training  Ambulation/Gait Training Performed: Yes  Ambulation/Gait Training 1  Surface 1: Level tile  Device 1: Rolling walker  Assistance 1: Contact guard  Quality of Gait 1: Inconsistent stride length, Decreased step length, Wide base of support, Soft knee(s) (Decrease SLS on R LE)  Comments/Distance (ft) 1: pt ambulated 100ft and 150 ft with 2 standing rest breaks  Transfers  Transfer: Yes  Transfer 1  Transfer From 1: Sit to, Stand to  Transfer to 1: Stand, Sit  Technique 1: Sit to stand, Stand to sit  Transfer Device 1: Walker  Transfer Level of Assistance 1: Contact guard         Outcome Measures:  [facility] Basic Mobility  Turning from your back to your side while in a flat bed without using  bedrails: None  Moving from lying on your back to sitting on the side of a flat bed without using bedrails: A little  Moving to and from bed to chair (including a wheelchair): A little  Standing up from a chair using your arms (e.g. wheelchair or bedside chair): A little  To walk in hospital room: A little  Climbing 3-5 steps with railing: A little  Basic Mobility - Total Score: 19    Education Documentation  Precautions, taught by Odalys Butler PT at 2/5/2024 12:24 PM.  Learner: Patient  Readiness: Eager  Method: Explanation  Response: Verbalizes Understanding    Body Mechanics, taught by Odalys Butler PT at 2/5/2024 12:24 PM.  Learner: Patient  Readiness: Eager  Method: Explanation  Response: Verbalizes Understanding    Mobility Training, taught by Odalys Butler PT at 2/5/2024 12:24 PM.  Learner: Patient  Readiness: Eager  Method: Explanation  Response: Verbalizes Understanding    Education Comments  No comments found.        OP EDUCATION:       Encounter Problems       Encounter Problems (Active)       Balance       Pt will tolerate dynamic standing balance activities independently  (Progressing)       Start:  02/02/24    Expected End:  02/16/24               Mobility       STG - Patient will ambulate >100ft using LRAD Abhi (Progressing)       Start:  02/02/24    Expected End:  02/16/24            STG - Patient will ascend and descend 2 steps per home entry set-up with SBA  (Progressing)       Start:  02/02/24    Expected End:  02/16/24               Pain          Safety       LTG - Patient will adhere to hip precautions during ADL's and transfers       Start:  02/01/24            LTG - Patient will demonstrate safety requirements appropriate to situation/environment       Start:  02/01/24            LTG - Patient will utilize safety techniques       Start:  02/01/24            STG - Patient locks brakes on wheelchair       Start:  02/01/24            STG - Patient uses call light  consistently to request assistance with transfers       Start:  02/01/24            STG - Patient uses gait belt during all transfers       Start:  02/01/24            Goal 1       Start:  02/01/24            Goal 2       Start:  02/01/24            Goal 3       Start:  02/01/24               Transfers       Pt will complete all functional transfers independently  (Progressing)       Start:  02/02/24    Expected End:  02/16/24

## 2024-02-06 ENCOUNTER — APPOINTMENT (OUTPATIENT)
Dept: RADIOLOGY | Facility: HOSPITAL | Age: 44
DRG: 371 | End: 2024-02-06
Payer: COMMERCIAL

## 2024-02-06 PROBLEM — Z86.718 HISTORY OF DVT (DEEP VEIN THROMBOSIS): Status: ACTIVE | Noted: 2024-02-06

## 2024-02-06 LAB
ALBUMIN SERPL BCP-MCNC: 3.4 G/DL (ref 3.4–5)
ALP SERPL-CCNC: 65 U/L (ref 33–110)
ALT SERPL W P-5'-P-CCNC: 27 U/L (ref 7–45)
ANION GAP SERPL CALC-SCNC: 14 MMOL/L (ref 10–20)
AST SERPL W P-5'-P-CCNC: 21 U/L (ref 9–39)
BILIRUB SERPL-MCNC: 1 MG/DL (ref 0–1.2)
BUN SERPL-MCNC: 13 MG/DL (ref 6–23)
CALCIUM SERPL-MCNC: 8.6 MG/DL (ref 8.6–10.6)
CHLORIDE SERPL-SCNC: 105 MMOL/L (ref 98–107)
CO2 SERPL-SCNC: 23 MMOL/L (ref 21–32)
CREAT SERPL-MCNC: 0.75 MG/DL (ref 0.5–1.05)
EGFRCR SERPLBLD CKD-EPI 2021: >90 ML/MIN/1.73M*2
ERYTHROCYTE [DISTWIDTH] IN BLOOD BY AUTOMATED COUNT: 18.2 % (ref 11.5–14.5)
GLUCOSE SERPL-MCNC: 100 MG/DL (ref 74–99)
HCT VFR BLD AUTO: 28.2 % (ref 36–46)
HGB BLD-MCNC: 9 G/DL (ref 12–16)
MCH RBC QN AUTO: 26.4 PG (ref 26–34)
MCHC RBC AUTO-ENTMCNC: 31.9 G/DL (ref 32–36)
MCV RBC AUTO: 83 FL (ref 80–100)
NRBC BLD-RTO: 0.4 /100 WBCS (ref 0–0)
PLATELET # BLD AUTO: 321 X10*3/UL (ref 150–450)
POTASSIUM SERPL-SCNC: 4.1 MMOL/L (ref 3.5–5.3)
PROT SERPL-MCNC: 5.7 G/DL (ref 6.4–8.2)
RBC # BLD AUTO: 3.41 X10*6/UL (ref 4–5.2)
SODIUM SERPL-SCNC: 138 MMOL/L (ref 136–145)
WBC # BLD AUTO: 14.6 X10*3/UL (ref 4.4–11.3)

## 2024-02-06 PROCEDURE — 93971 EXTREMITY STUDY: CPT | Performed by: STUDENT IN AN ORGANIZED HEALTH CARE EDUCATION/TRAINING PROGRAM

## 2024-02-06 PROCEDURE — 36415 COLL VENOUS BLD VENIPUNCTURE: CPT | Performed by: STUDENT IN AN ORGANIZED HEALTH CARE EDUCATION/TRAINING PROGRAM

## 2024-02-06 PROCEDURE — 1210000001 HC SEMI-PRIVATE ROOM DAILY

## 2024-02-06 PROCEDURE — 80053 COMPREHEN METABOLIC PANEL: CPT | Performed by: STUDENT IN AN ORGANIZED HEALTH CARE EDUCATION/TRAINING PROGRAM

## 2024-02-06 PROCEDURE — 93970 EXTREMITY STUDY: CPT

## 2024-02-06 PROCEDURE — 2500000001 HC RX 250 WO HCPCS SELF ADMINISTERED DRUGS (ALT 637 FOR MEDICARE OP)

## 2024-02-06 PROCEDURE — 85027 COMPLETE CBC AUTOMATED: CPT | Performed by: STUDENT IN AN ORGANIZED HEALTH CARE EDUCATION/TRAINING PROGRAM

## 2024-02-06 PROCEDURE — 99221 1ST HOSP IP/OBS SF/LOW 40: CPT | Performed by: INTERNAL MEDICINE

## 2024-02-06 RX ADMIN — ACETAMINOPHEN 650 MG: 325 TABLET ORAL at 17:34

## 2024-02-06 RX ADMIN — OXYCODONE HYDROCHLORIDE 5 MG: 5 TABLET ORAL at 21:46

## 2024-02-06 RX ADMIN — ACETAMINOPHEN 650 MG: 325 TABLET ORAL at 08:45

## 2024-02-06 RX ADMIN — OXYCODONE HYDROCHLORIDE 10 MG: 5 TABLET ORAL at 08:35

## 2024-02-06 RX ADMIN — ACETAMINOPHEN 650 MG: 325 TABLET ORAL at 13:12

## 2024-02-06 RX ADMIN — OXYCODONE HYDROCHLORIDE 5 MG: 5 TABLET ORAL at 13:17

## 2024-02-06 RX ADMIN — ACETAMINOPHEN 650 MG: 325 TABLET ORAL at 21:46

## 2024-02-06 RX ADMIN — OXYCODONE HYDROCHLORIDE 10 MG: 5 TABLET ORAL at 17:34

## 2024-02-06 ASSESSMENT — PAIN SCALES - GENERAL
PAINLEVEL_OUTOF10: 5 - MODERATE PAIN
PAINLEVEL_OUTOF10: 5 - MODERATE PAIN
PAINLEVEL_OUTOF10: 7
PAINLEVEL_OUTOF10: 2
PAINLEVEL_OUTOF10: 5 - MODERATE PAIN
PAINLEVEL_OUTOF10: 7
PAINLEVEL_OUTOF10: 2
PAINLEVEL_OUTOF10: 8

## 2024-02-06 ASSESSMENT — PAIN DESCRIPTION - LOCATION: LOCATION: ABDOMEN

## 2024-02-06 ASSESSMENT — PAIN - FUNCTIONAL ASSESSMENT
PAIN_FUNCTIONAL_ASSESSMENT: 0-10
PAIN_FUNCTIONAL_ASSESSMENT: 0-10

## 2024-02-06 ASSESSMENT — PAIN DESCRIPTION - ORIENTATION: ORIENTATION: LOWER

## 2024-02-06 ASSESSMENT — PAIN DESCRIPTION - DESCRIPTORS: DESCRIPTORS: SORE

## 2024-02-06 NOTE — PROGRESS NOTES
Assessment/Plan   Pt is 42 yo with known 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 s/p robotic total hysterectomy, LSO, right salpingectomy for AUB-L transferred to [facility] ICU in the s/o 17 cm retroperitoneal hematoma s/p bilateral UAE 2/1 with concern for continued intraabdominal bleeding.     Retroperitoneal hematoma  - CT (1/31): 17 cm pelvic hematoma extending into retroperitoneum  - s/p IR bilateral UAE (2/1)  - Currently hemodynamically normal, afebrile  - Abdominal exam benign  - Hgb stable in 8s  - No indication for surgical intervention at this time given stability of vital signs, and good urine output despite downtrend in hemoglobin. High threshold to operate given location of hematoma and reassuring clinical status indicates hematoma may have stabilized s/p UAE.  - Continue cipro/flagyl for infection ppx  - Consult vascular medicine to restart AC today, previously on coumadin  - PT/OT eval  - Regular diet     Discussed with Dr. Gigi Lpoez MD  Gyn Pager [phone]  Gyn Phone [phone] 5P - 6:30 A; [id number] 6:30 A - 5 P     Subjective   Pt is doing well with no abdominal pain. Tolerating PO intake. Passing flatus. Denies N/V, SOB, and CP.     Objective  Last Vitals  Temp Pulse Resp BP MAP O2 Sat   36.2 °C (97.2 °F) 105 19 (!) 116/95  95 %      Physical Examination  Physical Exam  Constitutional:       General: She is not in acute distress.     Appearance: Normal appearance.   HENT:      Head: Normocephalic and atraumatic.      Nose: Nose normal.   Eyes:      General: No scleral icterus.     Extraocular Movements: Extraocular movements intact.   Cardiovascular:      Rate and Rhythm: Normal rate.   Pulmonary:      Effort: Pulmonary effort is normal.   Abdominal:      Palpations: Abdomen is soft.      Comments: Incision clean/dry/intact with dermabond, tympanic, mild diffuse tenderness, no rebound, no guarding   Musculoskeletal:         General: Normal range of motion.   Skin:     General: Skin is warm and dry.   Neurological:       SUBJECTIVE:  Chief Complaint: Severe COPD, pulmonary emphysema, shortness of breath, nocturnal hypoxemia  Robert Sousa fortunately has had no recent bronchitic infections and has had no known COVID-19 exposure. He denies any fever associate with cough. He continues on Symbicort twice daily and DuoNeb per nebulizer 4 times a day. He states that he has spells of great anxiety and cannot eat or drink when this happens. This also exacerbates his shortness of breath and overall makes him feel very poorly. He is not on antianxiety medication and is not on medications for depression. He continues to wear oxygen at nighttime. He has not qualified for daytime oxygen. He denies chest pain or hemoptysis      ROS:  Constitution:  HEENT: Negative for ear, throat pain  Cardiovascular: Negative for chest pain, syncope, edema  Pulmonary: See HPI  Musculoskeletal: Negative for DVT, myalgias, arthralgias    OBJECTIVE:  /80   Pulse 92   Ht 6' 4\" (1.93 m)   Wt 198 lb (89.8 kg)   SpO2 97%   BMI 24.10 kg/m²      Physical Exam:  Constitutional:  He appears well developed and well-nourished. Neck:  Supple, No palpable lymphadenopathy, No JVD  Cardiovascular:  S1, S2 Normal, Regular rhythm, no murmurs or gallops, No pericardial  rubs. Pulmonary: Diminished breath sounds throughout all areas without wheezing or rhonchi  Abdomen: Not examined  Extremities: no edema, No DVT  Neurologic: Oriented x3, No focal deficits    Radiology: Chest x-ray on 3/8/2019 showed no acute process with stable COPD  PFT: Office spirometry in 6/2/2020 demonstrated severe obstructive defect with a significant response to bronchodilators in his small airways      Echocardiogram: 3/29/2019   Left ventricular systolic function is normal with an ejection fraction of   55-60%. Grade I diastolic dysfunction. No significant valvular disease noted. No evidence of pericardial effusion    ASSESSMENT:    1.  COPD, severe ([address]    2. Pulmonary emphysema, Mental Status: She is alert.      Cranial Nerves: No cranial nerve deficit.   Psychiatric:         Mood and Affect: Mood normal.   : rose in place draining yellow-orange urine     Lab Review  Results for orders placed or performed during the hospital encounter of 02/01/24 (from the past 24 hour(s))   Basic metabolic panel   Result Value Ref Range    Glucose 104 (H) 74 - 99 mg/dL    Sodium 133 (L) 136 - 145 mmol/L    Potassium 4.0 3.5 - 5.3 mmol/L    Chloride 101 98 - 107 mmol/L    Bicarbonate 23 21 - 32 mmol/L    Anion Gap 13 10 - 20 mmol/L    Urea Nitrogen 14 6 - 23 mg/dL    Creatinine 0.77 0.50 - 1.05 mg/dL    eGFR >90 >60 mL/min/1.73m*2    Calcium 9.0 8.6 - 10.6 mg/dL   Magnesium   Result Value Ref Range    Magnesium 2.17 1.60 - 2.40 mg/dL   CBC   Result Value Ref Range    WBC 10.6 4.4 - 11.3 x10*3/uL    nRBC 0.7 (H) 0.0 - 0.0 /100 WBCs    RBC 3.16 (L) 4.00 - 5.20 x10*6/uL    Hemoglobin 8.2 (L) 12.0 - 16.0 g/dL    Hematocrit 27.1 (L) 36.0 - 46.0 %    MCV 86 80 - 100 fL    MCH 25.9 (L) 26.0 - 34.0 pg    MCHC 30.3 (L) 32.0 - 36.0 g/dL    RDW 17.9 (H) 11.5 - 14.5 %    Platelets 249 150 - 450 x10*3/uL      unspecified emphysema type ([facility] [address]    3. Nocturnal hypoxemia    4. Shortness of breath    5. Former smoker          PLAN:   I think he would benefit from a thorough general physical exam and may benefit from antianxiety or antidepressant medication. I will not change his current bronchodilator therapy. I would like to repeat his chest x-ray in the near future. I will continue to follow him    We have discussed the need to maintain yearly flu immunization, pneumococcal vaccination. We have discussed Coronavirus precaution including handwashing practice, wiping items touched in public such as gas pumps, door handles, shopping carts, etc. Self monitoring for infection - fever, chills, cough, SOB. Should they develop symptoms they should call office for further instructions. Return in about 4 months (around 3/16/2021) for Recheck for COPD, Recheck for Shortness of Breath, Nocturnal hypoxemia. This dictation was performed with a verbal recognition program and it was checked for errors. It is possible that there are still dictated errors within this office note. Any errors should be brought immediately to my attention for correction. All efforts were made to ensure that this office note is accurate.

## 2024-02-06 NOTE — CONSULTS
Consults  History Of Present Illness:    Katarina Merino is a 43 y.o. female presenting with  known chronic DVT DVT was diagnosed back in 2018 and then when she went back in 2020 they told her that there were chronic changes in the right lower extremity and she was continued on AC s/p robotic total hysterectomy, LSO, right salpingectomy for AUB-L transferred to [facility] ICU in the s/o 17 cm retroperitoneal hematoma s/p bilateral UAE 2/1 with concern for continued intraabdominal bleeding.    Had a long extensive interview and history with the patient she mentioned that she never had any history of VTE in the past apart from back in 2018 she was diagnosed with right lower extremity DVT and it was attribute it back then to OCP, then she was diagnosed with chronic VTE on the right lower extremity back in 2020 they told her that those were old chronic changes also she mentioned that she had a head bleed and it was secondary to most probably the anticoagulation  When asked about right lower extremity symptoms she mentioned that there are minimal with leg swelling worse at the end of the day and when compared to the left leg definitely the right leg has a bigger circumference than the left leg, no prominent varicose veins, no spontaneous bleeding no spontaneous ulceration with minimal pain and discomfort sometimes at the end of the day.  Patient is hemodynamically stable with normal abdominal exam hemoglobin is around eight 8.5 and no indication for surgical intervention at this time.  Patient has been on anticoagulation since 2018 for what it seems to be a look to be a provoked right lower extremity VTE     Last Recorded Vitals:  Vitals:    02/05/24 2357 02/06/24 0317 02/06/24 0757 02/06/24 1144   BP: 137/84 121/81 (!) 136/90 125/86   BP Location:       Patient Position:       Pulse: 84 85 86 76   Resp: 18 18 18 18   Temp: 36.4 °C (97.5 °F) 36.3 °C (97.3 °F) 36.2 °C (97.2 °F) 36.2 °C (97.2 °F)   TempSrc: Temporal Temporal  Temporal Temporal   SpO2: 96% 98% 96% 97%   Weight:       Height:           Last Labs:  CBC - 2/5/2024:  7:50 AM  10.6 8.2 249    27.1      CMP - 2/5/2024:  7:14 AM  9.0 6.0 20 --- 0.5   3.6 3.0 59 52      PTT - 2/4/2024:  3:54 AM  1.0   11.4 29     Troponin I, High Sensitivity   Date/Time Value Ref Range Status   01/31/2024 05:14 PM 63 (HH) 0 - 13 ng/L Final     Comment:     Previous result verified on 1/31/2024 1633 on specimen/case [id number] called with component [facility] for procedure Troponin I, High Sensitivity, Initial with value 74 ng/L.   01/31/2024 03:57 PM 74 (HH) 0 - 13 ng/L Final     BNP   Date/Time Value Ref Range Status   01/31/2024 03:57 PM 15 0 - 99 pg/mL Final   10/09/2019 01:42  (H) 0 - 99 pg/mL Final     Comment:     .  <100 pg/mL - Heart failure unlikely  100-299 pg/mL - Intermediate probability of acute heart  .               failure exacerbation. Correlate with clinical  .               context and patient history.    >=300 pg/mL - Heart Failure likely. Correlate with clinical  .               context and patient history.  BNP testing is performed using different testing   methodology at [facility] than at other   [facility]. Direct result comparisons should   only be made within the same method.       Hemoglobin A1C   Date/Time Value Ref Range Status   10/14/2023 11:10 AM 5.9 (H) 4.3 - 5.6 % Final     Comment:     American Diabetes Association guidelines indicate that patients with HgbA1c in the range 5.7-6.4% are at increased risk for development of diabetes, and intervention by lifestyle modification may be beneficial. HgbA1c greater or equal to 6.5% is considered diagnostic of diabetes.   11/19/2022 09:40 AM 5.6 4.3 - 5.6 % Final     Comment:     American Diabetes Association guidelines indicate that patients with HgbA1c in the range 5.7-6.4% are at increased risk for development of diabetes, and intervention by lifestyle modification may be beneficial. HgbA1c  "greater or equal to 6.5% is considered diagnostic of diabetes.     VLDL   Date/Time Value Ref Range Status   10/09/2019 01:42 AM 24 0 - 40 mg/dL Final      Last I/O:  I/O last 3 completed shifts:  In: 600 (4.1 mL/kg) [IV Piggyback:600]  Out: 400 (2.7 mL/kg) [Urine:400 (0.1 mL/kg/hr)]  Weight: 147 kg     Past Cardiology Tests (Last 3 Years):  EKG:  ECG 12 lead 2024      ECG 12 lead (Clinic Performed) 01/15/2024    Echo:  No results found for this or any previous visit from the past 1095 days.    Ejection Fractions:  No results found for: \"EF\"  Cath:  No results found for this or any previous visit from the past 1095 days.    Stress Test:  No results found for this or any previous visit from the past 1095 days.    Cardiac Imaging:  No results found for this or any previous visit from the past 1095 days.      Past Medical History:  She has a past medical history of DVT of lower extremity (deep venous thrombosis) (CMS/[facility]), Fibroid, Hyperlipidemia, Hypertension, Iron deficiency anemia due to chronic blood loss, Long term current use of anticoagulant, Menorrhagia with regular cycle, Stroke (CMS/HCC) (), and Vision loss.    Past Surgical History:  She has a past surgical history that includes Umbilical hernia repair; CT angio head w and wo IV contrast (10/08/2019);  section, low transverse; Tonsillectomy; [place] tooth extraction; Laparoscopic hysterectomy; and Oophorectomy (Left).      Social History:  She reports that she has never smoked. She has never used smokeless tobacco. She reports current alcohol use of about 2.0 standard drinks of alcohol per week. She reports that she does not use drugs.    Family History:  Family History   Problem Relation Name Age of Onset    Diabetes Mother      Hypertension Mother      Hypertension Father      Hyperlipidemia Father      Hypertension Brother      Hyperlipidemia Brother      Breast cancer Other Aunt         Allergies:  Penicillins    Inpatient " Medications:  Scheduled medications   Medication Dose Route Frequency     PRN medications   Medication    acetaminophen    ondansetron    oxyCODONE    oxyCODONE    sennosides-docusate sodium     Continuous Medications   Medication Dose Last Rate     Outpatient Medications:  Current Outpatient Medications   Medication Instructions    amLODIPine (NORVASC) 10 mg, oral, Daily    ascorbic acid (Vitamin C) 250 mg tablet Take 1 tablet (250 mg) by mouth once daily.    atorvastatin (LIPITOR) 10 mg, oral, Daily    docusate sodium (COLACE) 200 mg, oral, 2 times daily    enoxaparin (LOVENOX) 150 mg, subcutaneous, Every 12 hours    ferrous sulfate 300 mg (60 mg iron)/5 mL syrup 60 mg of iron, oral, Daily    hydroCHLOROthiazide (HYDRODIURIL) 25 mg, oral, Daily RT    labetalol (Normodyne) 300 mg tablet 1 tablet, oral, 2 times daily    multivitamin tablet Take 1 tablet by mouth once daily.    oxyCODONE-acetaminophen (Percocet) 5-325 mg tablet 1 tablet, oral, Every 6 hours PRN    warfarin (Coumadin) 5 mg tablet Take 3 tablets (15 mg) by mouth once daily.       Physical Exam:  Constitutional:       General: She is not in acute distress.     Appearance: Normal appearance.   HENT:      Head: Normocephalic and atraumatic.      Nose: Nose normal.   Eyes:      General: No scleral icterus.     Extraocular Movements: Extraocular movements intact.   Cardiovascular:      Rate and Rhythm: Normal rate.   Pulmonary:      Effort: Pulmonary effort is normal.   Abdominal:      Palpations: Abdomen is soft.      Comments: Incision clean/dry/intact with dermabond, tympanic, mild diffuse tenderness, no rebound, no guarding   Musculoskeletal:         General: Normal range of motion.  Right leg has minimal swelling with evidence of increase in the right leg circumference below the knee when compared to the left lower extremity below the knee  Skin:     General: Skin is warm and dry.   Neurological:      Mental Status: She is alert.      Cranial Nerves:  No cranial nerve deficit.   Psychiatric:         Mood and Affect: Mood normal.      Assessment/Plan   Pt is 44 yo with known 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 s/p robotic total hysterectomy, LSO, right salpingectomy for AUB-L transferred to [facility] ICU in the s/o 17 cm retroperitoneal hematoma s/p bilateral UAE 2/1 with concern for continued intraabdominal bleeding.    Recommendations  1-I would hold for any anticoagulation for now as the patient had recent retroperitoneal bleed/hematoma and I would obtain Doppler venous ultrasound bilateral lower extremities (which showed partially compressible nonocclusive thrombus in the ER IV and the common femoral vein proximal on the left side that is consistent with subacute/chronic changes in addition to single noncompressible occlusive left posterior tibial vein which to me looked as chronic subacute changes)  2-continue to monitor hemoglobin and hematocrit while in the hospital  3-continue to monitor for any clinical evidence of retroperitoneal hematoma extension or any evidence of hemodynamical changes  4-we will continue to follow at the vascular medicine clinic at either [place] or [facility] in 1 to 2 weeks and will repeat right lower extremity venous ultrasound.       Code Status:  Full Code    I spent 55 minutes in the professional and overall care of this patient.        Ming Ricketts MD

## 2024-02-06 NOTE — DISCHARGE SUMMARY
Discharge Summary    Admission Date: 2/1/2024  Discharge Date: 2/6/24    Discharge Diagnosis  Hemorrhagic shock (CMS/HCC)    Hospital Course  Katarina Merino is a 42 yo with known 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 s/p robotic total hysterectomy, LSO, right salpingectomy for AUB-L transferred to [facility] ICU in the s/o 17 cm retroperitoneal hematoma s/p bilateral UAE with concern for continued intraabdominal bleeding. CT (1/31): 17 cm pelvic hematoma extending into retroperitoneum with IR bilateral UAE (2/1). During admission patient was transfused in the ICU multiple units and no surgical intervention was performed. Cipro and Flagyl was continued throughout admission for infection ppx. After stability was established, patient was transferred to the floor and vascular medicine was consulted recommending outpatient follow up with holding AC given the retroperitoneal bleed.     Discharge Meds     Your medication list        CONTINUE taking these medications        Instructions Last Dose Given Next Dose Due   amLODIPine 10 mg tablet  Commonly known as: Norvasc           ascorbic acid 250 mg tablet  Commonly known as: Vitamin C           atorvastatin 10 mg tablet  Commonly known as: Lipitor           docusate sodium 100 mg capsule  Commonly known as: Colace      Take 2 capsules (200 mg) by mouth 2 times a day.       enoxaparin 150 mg/mL injection  Commonly known as: Lovenox           ferrous sulfate 300 mg (60 mg iron)/5 mL syrup           hydroCHLOROthiazide 25 mg tablet  Commonly known as: HYDRODiuril           labetalol 300 mg tablet  Commonly known as: Normodyne           multivitamin tablet           oxyCODONE-acetaminophen 5-325 mg tablet  Commonly known as: Percocet      Take 1 tablet by mouth every 6 hours if needed for severe pain (7 - 10).              STOP taking these medications      warfarin 5 mg tablet  Commonly known as: Coumadin                  Complications Requiring Follow-Up  Need for AC, retroperitoneal beled    Test  Results Pending At Discharge  Pending Labs       Order Current Status    Comprehensive Metabolic Panel Collected (02/06/24 1413)    CBC In process            Outpatient Follow-Up  Future Appointments   Date Time Provider Department Center   2/8/2024  2:15 PM Kenroy Naidu MD [id number] Bob Lopez MD

## 2024-02-06 NOTE — CARE PLAN
The patient's goals for the shift include pain control    The clinical goals for the shift include pain management pain <5/10    Patient remained free from falls/injury throughout shift. VSS and pain well controlled with tylenol and oxycodone. Labs showing improvement, plan to dc tomorrow if remaining stable.

## 2024-02-07 VITALS
OXYGEN SATURATION: 97 % | WEIGHT: 293 LBS | HEART RATE: 80 BPM | DIASTOLIC BLOOD PRESSURE: 87 MMHG | RESPIRATION RATE: 18 BRPM | HEIGHT: 68 IN | BODY MASS INDEX: 44.41 KG/M2 | SYSTOLIC BLOOD PRESSURE: 122 MMHG | TEMPERATURE: 96.8 F

## 2024-02-07 DIAGNOSIS — Z86.718 HISTORY OF DVT (DEEP VEIN THROMBOSIS): Primary | ICD-10-CM

## 2024-02-07 PROBLEM — R57.8 HEMORRHAGIC SHOCK (MULTI): Status: RESOLVED | Noted: 2024-02-01 | Resolved: 2024-02-07

## 2024-02-07 LAB
ANION GAP SERPL CALC-SCNC: 13 MMOL/L (ref 10–20)
BUN SERPL-MCNC: 13 MG/DL (ref 6–23)
CALCIUM SERPL-MCNC: 8.9 MG/DL (ref 8.6–10.6)
CHLORIDE SERPL-SCNC: 105 MMOL/L (ref 98–107)
CO2 SERPL-SCNC: 24 MMOL/L (ref 21–32)
CREAT SERPL-MCNC: 0.76 MG/DL (ref 0.5–1.05)
EGFRCR SERPLBLD CKD-EPI 2021: >90 ML/MIN/1.73M*2
ERYTHROCYTE [DISTWIDTH] IN BLOOD BY AUTOMATED COUNT: 18.3 % (ref 11.5–14.5)
GLUCOSE SERPL-MCNC: 86 MG/DL (ref 74–99)
HCT VFR BLD AUTO: 31.2 % (ref 36–46)
HGB BLD-MCNC: 9.5 G/DL (ref 12–16)
MAGNESIUM SERPL-MCNC: 2.11 MG/DL (ref 1.6–2.4)
MCH RBC QN AUTO: 25.4 PG (ref 26–34)
MCHC RBC AUTO-ENTMCNC: 30.4 G/DL (ref 32–36)
MCV RBC AUTO: 83 FL (ref 80–100)
NRBC BLD-RTO: 0.4 /100 WBCS (ref 0–0)
PLATELET # BLD AUTO: 340 X10*3/UL (ref 150–450)
POTASSIUM SERPL-SCNC: 4 MMOL/L (ref 3.5–5.3)
RBC # BLD AUTO: 3.74 X10*6/UL (ref 4–5.2)
SODIUM SERPL-SCNC: 138 MMOL/L (ref 136–145)
WBC # BLD AUTO: 13.8 X10*3/UL (ref 4.4–11.3)

## 2024-02-07 PROCEDURE — 36415 COLL VENOUS BLD VENIPUNCTURE: CPT | Performed by: STUDENT IN AN ORGANIZED HEALTH CARE EDUCATION/TRAINING PROGRAM

## 2024-02-07 PROCEDURE — 99233 SBSQ HOSP IP/OBS HIGH 50: CPT | Performed by: INTERNAL MEDICINE

## 2024-02-07 PROCEDURE — 85027 COMPLETE CBC AUTOMATED: CPT | Performed by: STUDENT IN AN ORGANIZED HEALTH CARE EDUCATION/TRAINING PROGRAM

## 2024-02-07 PROCEDURE — 2500000001 HC RX 250 WO HCPCS SELF ADMINISTERED DRUGS (ALT 637 FOR MEDICARE OP)

## 2024-02-07 PROCEDURE — 2500000004 HC RX 250 GENERAL PHARMACY W/ HCPCS (ALT 636 FOR OP/ED): Performed by: STUDENT IN AN ORGANIZED HEALTH CARE EDUCATION/TRAINING PROGRAM

## 2024-02-07 PROCEDURE — 99238 HOSP IP/OBS DSCHRG MGMT 30/<: CPT | Performed by: STUDENT IN AN ORGANIZED HEALTH CARE EDUCATION/TRAINING PROGRAM

## 2024-02-07 PROCEDURE — 80048 BASIC METABOLIC PNL TOTAL CA: CPT | Performed by: STUDENT IN AN ORGANIZED HEALTH CARE EDUCATION/TRAINING PROGRAM

## 2024-02-07 PROCEDURE — 83735 ASSAY OF MAGNESIUM: CPT | Performed by: STUDENT IN AN ORGANIZED HEALTH CARE EDUCATION/TRAINING PROGRAM

## 2024-02-07 PROCEDURE — 2500000001 HC RX 250 WO HCPCS SELF ADMINISTERED DRUGS (ALT 637 FOR MEDICARE OP): Performed by: STUDENT IN AN ORGANIZED HEALTH CARE EDUCATION/TRAINING PROGRAM

## 2024-02-07 PROCEDURE — 87086 URINE CULTURE/COLONY COUNT: CPT

## 2024-02-07 RX ORDER — ONDANSETRON 4 MG/1
4 TABLET, ORALLY DISINTEGRATING ORAL EVERY 8 HOURS PRN
Qty: 20 TABLET | Refills: 0 | OUTPATIENT
Start: 2024-02-07 | End: 2024-02-18

## 2024-02-07 RX ORDER — AMOXICILLIN 250 MG
1 CAPSULE ORAL 2 TIMES DAILY
Status: DISCONTINUED | OUTPATIENT
Start: 2024-02-07 | End: 2024-02-07 | Stop reason: HOSPADM

## 2024-02-07 RX ORDER — POLYETHYLENE GLYCOL 3350 17 G/17G
17 POWDER, FOR SOLUTION ORAL 2 TIMES DAILY
Status: DISCONTINUED | OUTPATIENT
Start: 2024-02-07 | End: 2024-02-07 | Stop reason: HOSPADM

## 2024-02-07 RX ORDER — POLYETHYLENE GLYCOL 3350 17 G/17G
17 POWDER, FOR SOLUTION ORAL DAILY PRN
Qty: 10 PACKET | Refills: 0 | Status: SHIPPED | OUTPATIENT
Start: 2024-02-07 | End: 2024-02-26 | Stop reason: WASHOUT

## 2024-02-07 RX ORDER — ACETAMINOPHEN 325 MG/1
650 TABLET ORAL EVERY 6 HOURS PRN
Qty: 20 TABLET | Refills: 0 | Status: SHIPPED | OUTPATIENT
Start: 2024-02-07 | End: 2024-02-26 | Stop reason: WASHOUT

## 2024-02-07 RX ORDER — IBUPROFEN 600 MG/1
600 TABLET ORAL EVERY 6 HOURS PRN
Qty: 20 TABLET | Refills: 0 | Status: SHIPPED | OUTPATIENT
Start: 2024-02-07 | End: 2024-02-26 | Stop reason: WASHOUT

## 2024-02-07 RX ADMIN — ACETAMINOPHEN 650 MG: 325 TABLET ORAL at 05:40

## 2024-02-07 RX ADMIN — POLYETHYLENE GLYCOL 3350 17 G: 17 POWDER, FOR SOLUTION ORAL at 10:01

## 2024-02-07 RX ADMIN — OXYCODONE HYDROCHLORIDE 10 MG: 5 TABLET ORAL at 08:11

## 2024-02-07 RX ADMIN — ACETAMINOPHEN 650 MG: 325 TABLET ORAL at 01:45

## 2024-02-07 RX ADMIN — SENNOSIDES AND DOCUSATE SODIUM 1 TABLET: 8.6; 5 TABLET ORAL at 10:00

## 2024-02-07 RX ADMIN — OXYCODONE HYDROCHLORIDE 5 MG: 5 TABLET ORAL at 18:06

## 2024-02-07 RX ADMIN — ACETAMINOPHEN 650 MG: 325 TABLET ORAL at 13:22

## 2024-02-07 RX ADMIN — OXYCODONE HYDROCHLORIDE 10 MG: 5 TABLET ORAL at 13:21

## 2024-02-07 RX ADMIN — OXYCODONE HYDROCHLORIDE 10 MG: 5 TABLET ORAL at 01:45

## 2024-02-07 ASSESSMENT — PAIN - FUNCTIONAL ASSESSMENT
PAIN_FUNCTIONAL_ASSESSMENT: 0-10

## 2024-02-07 ASSESSMENT — PAIN SCALES - GENERAL
PAINLEVEL_OUTOF10: 8
PAINLEVEL_OUTOF10: 7
PAINLEVEL_OUTOF10: 6
PAINLEVEL_OUTOF10: 0 - NO PAIN
PAINLEVEL_OUTOF10: 8
PAINLEVEL_OUTOF10: 4
PAINLEVEL_OUTOF10: 6
PAINLEVEL_OUTOF10: 4

## 2024-02-07 ASSESSMENT — PAIN DESCRIPTION - ORIENTATION
ORIENTATION: LOWER

## 2024-02-07 ASSESSMENT — PAIN DESCRIPTION - LOCATION
LOCATION: ABDOMEN

## 2024-02-07 NOTE — DISCHARGE SUMMARY
Discharge Summary    Admission Date: 2/1/2024  Discharge Date: 2/7/2024    Discharge Diagnosis  Hemorrhagic shock (CMS/HCC)    Hospital Course  42 yo F with extensive hx of DVT, CVA previously therapeutically anticoagulated on Coumadin presented to ED on 1/31 for syncopal episode s/p ANTHONY-BSO on 1/24 for symptomatic fibroids. Hgb 5.7 on admission. CT A/P showed right retroperitoneal hematoma measuring 17 cm. Pt was given Kcentra and Vitamin K and transferred to IR suite for pelvic angioembolization. Pt was then transferred to SICU with plan for close monitoring and transfusion for Hgb > 7. Pt remained hemodynamically normal with excellent urine output throughout 5 day ICU stay. On HD#4, pt no longer requiring blood transfusions. Remained hemodynamically stable with good urine output. In total, pt received 5 units pRBCs. On HD#6, pt considered stable for possible AC restart. Vascular medicine consulted recommended to continue to hold AC. Repeat doppler redemonstrated chronic LE DVT. Per vascular medicine, will repeat dopplers at outpatient visit with plan for IVC filter placement pending repeat dopplers. On HD#8, hgb remained stable. Pt hemodynamically normal with good urine output and considered stable for discharge with scheduled follow-up to GYN and Vascular Medicine. Return precautions reviewed.     Discharge Meds     Your medication list        START taking these medications        Instructions Last Dose Given Next Dose Due   acetaminophen 325 mg tablet  Commonly known as: Tylenol      Take 2 tablets (650 mg) by mouth every 6 hours if needed for mild pain (1 - 3) for up to 20 doses.       ibuprofen 600 mg tablet      Take 1 tablet (600 mg) by mouth every 6 hours if needed for moderate pain (4 - 6) for up to 20 doses.       ondansetron ODT 4 mg disintegrating tablet  Commonly known as: Zofran-ODT      Take 1 tablet (4 mg) by mouth every 8 hours if needed for nausea or vomiting.       polyethylene glycol 17 gram  packet  Commonly known as: Glycolax, Miralax      Take 17 g by mouth once daily as needed (for constipation) for up to 10 doses.              CONTINUE taking these medications        Instructions Last Dose Given Next Dose Due   amLODIPine 10 mg tablet  Commonly known as: Norvasc           ascorbic acid 250 mg tablet  Commonly known as: Vitamin C           atorvastatin 10 mg tablet  Commonly known as: Lipitor           docusate sodium 100 mg capsule  Commonly known as: Colace      Take 2 capsules (200 mg) by mouth 2 times a day.       enoxaparin 150 mg/mL injection  Commonly known as: Lovenox  Notes to patient: Do not take           ferrous sulfate 300 mg (60 mg iron)/5 mL syrup           hydroCHLOROthiazide 25 mg tablet  Commonly known as: HYDRODiuril           labetalol 300 mg tablet  Commonly known as: Normodyne           multivitamin tablet           oxyCODONE-acetaminophen 5-325 mg tablet  Commonly known as: Percocet      Take 1 tablet by mouth every 6 hours if needed for severe pain (7 - 10).              STOP taking these medications      warfarin 5 mg tablet  Commonly known as: Coumadin                  Where to Get Your Medications        These medications were sent to [facility]/pharmacy #[id number] - [facility] [address] AT [place] [address] [address], [facility] [address]      Phone: [phone]   acetaminophen 325 mg tablet  ibuprofen 600 mg tablet  ondansetron ODT 4 mg disintegrating tablet  polyethylene glycol 17 gram packet        Complications Requiring Follow-Up  none    Test Results Pending At Discharge  Pending Labs       No current pending labs.          Outpatient Follow-Up  Future Appointments   Date Time Provider Department [place]   2/13/2024 10:00 AM Kenroy Naidu MD [id number]   2/14/2024 11:00 AM [facility] VASC LAB 3 [id number] [address]     Seen and discussed with Dr. Judah Rivera MD PGY1    Stewart Medel MD  Reviewed R1 note and made edits within  text.

## 2024-02-07 NOTE — HOSPITAL COURSE
Katarina Merino 43 y.o. female presenting with a syncopal episode that occurred earlier today while she was on the toilet. Did not hit her head, however,  found her unconscious on the toilet. She recently had a ANTHONY-BSO 1 week ago due to endometriosis, menorrhagia, and a symptomatic fibroid 9 x 9 x 8 cm.  Since the procedure, she mentions she has been feeling pretty good, but a couple days ago, she started having some abdominal pain, but had some Oxycodone which was relieving the pain. However, after being found unconscious, her  decided to call EMS to bring her to the     Imaging: CT C/A/P showing heterogeneous lobulated collection extending from the pelvis to the right retroperitoneum measuring 17 cm concerning for active extravasation, likely related to recent hysterectomy.  There is also small right intramuscular transverse abdominal hematoma, measuring 1 cm  In the ED, she was given ciprofloxacin, vancomycin, and Flagyl for antibiotic coverage.  She was given morphine and Dilaudid for pain, Zofran for nausea.  Patient's INR was 2.2 (therapeutic because patient is on Coumadin), however, after Kcentra and vitamin K, INR decreased to 1.1.  IR was consulted who then took patient to IR suite for pelvic angioembolization.  She was then admitted to the ICU for further hemodynamic monitoring.  Since admission to the ICU patient has been hemodynamically stable, no pressor requirements.  Repeat a.m. hemoglobin 6.6, patient was transfused an additional 2 unit of PRBCs.     Pt was transferred to Gyn service on 2/5/2024. Her Hb was stable in 8s. Her anticoagulant was held off per vascular surgery recs. Her vitals stayed stable. She completed Cipro and Flagyl for prophylaxis.   She is discharged on 2/7/2024. She will see her routine OBG 2/12/2024.

## 2024-02-07 NOTE — PROGRESS NOTES
Subjective Data:  Katarina Merino is a 43 y.o. female presenting with  known chronic DVT DVT was diagnosed back in 2018 and then when she went back in 2020 they told her that there were chronic changes in the right lower extremity and she was continued on AC s/p robotic total hysterectomy, LSO, right salpingectomy for AUB-L transferred to [facility] ICU in the s/o 17 cm retroperitoneal hematoma s/p bilateral UAE 2/1 with concern for continued intraabdominal bleeding.     Overnight Events:    Uneventful overnight events, no complaints, results of lower extremity Doppler venous ultrasound were reviewed and I still think it will change our chronic changes this is an acute event patient is completely asymptomatic with no changes in the right lower extremity, no respiratory symptoms, no symptoms suggestive of increase in peritoneal hematoma     Objective Data:  Last Recorded Vitals:  Vitals:    02/06/24 1957 02/07/24 0006 02/07/24 0401 02/07/24 0820   BP: 134/87 130/82 118/78 126/87   BP Location: Right arm Right arm Right arm    Patient Position: Lying Lying Lying    Pulse: 84 74 72 78   Resp: 18 18 18 18   Temp: 36.1 °C (97 °F) 36.7 °C (98.1 °F) 36.5 °C (97.7 °F) 36.9 °C (98.4 °F)   TempSrc: Temporal Temporal Temporal Temporal   SpO2: 96% 98% 98% 99%   Weight:       Height:           Last Labs:  CBC - 2/7/2024:  8:34 AM  13.8 9.5 340    31.2      CMP - 2/7/2024:  8:34 AM  8.9 5.7 21 --- 1.0   3.6 3.4 27 65      PTT - 2/4/2024:  3:54 AM  1.0   11.4 29     TROPHS   Date/Time Value Ref Range Status   01/31/2024 05:14 PM 63 0 - 13 ng/L Final     Comment:     Previous result verified on 1/31/2024 1633 on specimen/case [id number] called with component [facility] for procedure Troponin I, High Sensitivity, Initial with value 74 ng/L.   01/31/2024 03:57 PM 74 0 - 13 ng/L Final     BNP   Date/Time Value Ref Range Status   01/31/2024 03:57 PM 15 0 - 99 pg/mL Final   10/09/2019 01:42  0 - 99 pg/mL Final     Comment:     .  <100  "pg/mL - Heart failure unlikely  100-299 pg/mL - Intermediate probability of acute heart  .               failure exacerbation. Correlate with clinical  .               context and patient history.    >=300 pg/mL - Heart Failure likely. Correlate with clinical  .               context and patient history.  BNP testing is performed using different testing   methodology at [facility] than at other   [facility]. Direct result comparisons should   only be made within the same method.       HGBA1C   Date/Time Value Ref Range Status   10/14/2023 11:10 AM 5.9 4.3 - 5.6 % Final     Comment:     American Diabetes Association guidelines indicate that patients with HgbA1c in the range 5.7-6.4% are at increased risk for development of diabetes, and intervention by lifestyle modification may be beneficial. HgbA1c greater or equal to 6.5% is considered diagnostic of diabetes.   11/19/2022 09:40 AM 5.6 4.3 - 5.6 % Final     Comment:     American Diabetes Association guidelines indicate that patients with HgbA1c in the range 5.7-6.4% are at increased risk for development of diabetes, and intervention by lifestyle modification may be beneficial. HgbA1c greater or equal to 6.5% is considered diagnostic of diabetes.     VLDL   Date/Time Value Ref Range Status   10/09/2019 01:42 AM 24 0 - 40 mg/dL Final      Last I/O:  I/O last 3 completed shifts:  In: - (0 mL/kg)   Out: 300 (2 mL/kg) [Urine:300 (0.1 mL/kg/hr)]  Weight: 147 kg     Past Cardiology Tests (Last 3 Years):  EKG:  ECG 12 lead 01/31/2024      ECG 12 lead (Clinic Performed) 01/15/2024    Echo:  No results found for this or any previous visit from the past 1095 days.    Ejection Fractions:  No results found for: \"EF\"  Cath:  No results found for this or any previous visit from the past 1095 days.    Stress Test:  No results found for this or any previous visit from the past 1095 days.    Cardiac Imaging:  No results found for this or any previous visit from " the past 1095 days.      Inpatient Medications:  Scheduled medications   Medication Dose Route Frequency    polyethylene glycol  17 g oral BID    sennosides-docusate sodium  1 tablet oral BID     PRN medications   Medication    acetaminophen    ondansetron    oxyCODONE    oxyCODONE    sennosides-docusate sodium     Continuous Medications   Medication Dose Last Rate       Physical Exam:  Constitutional:       General: She is not in acute distress.     Appearance: Normal appearance.   HENT:      Head: Normocephalic and atraumatic.      Nose: Nose normal.   Eyes:      General: No scleral icterus.     Extraocular Movements: Extraocular movements intact.   Cardiovascular:      Rate and Rhythm: Normal rate.   Pulmonary:      Effort: Pulmonary effort is normal.   Abdominal:      Palpations: Abdomen is soft.      Comments: Incision clean/dry/intact with dermabond, tympanic, mild diffuse tenderness, no rebound, no guarding   Musculoskeletal:         General: Normal range of motion.  Right leg has minimal swelling with evidence of increase in the right leg circumference below the knee when compared to the left lower extremity below the knee  Skin:     General: Skin is warm and dry.   Neurological:      Mental Status: She is alert.      Cranial Nerves: No cranial nerve deficit.   Psychiatric:         Mood and Affect: Mood normal.      Assessment/Plan   1-I would hold for any anticoagulation for now as the patient had recent retroperitoneal bleed/hematoma and I would obtain Doppler venous ultrasound bilateral lower extremities (which showed partially compressible nonocclusive thrombus in the ER IV and the common femoral vein proximal on the left side that is consistent with subacute/chronic changes in addition to single noncompressible occlusive left posterior tibial vein which to me looked as chronic subacute changes)  2-continue to monitor hemoglobin and hematocrit while in the hospital  3-continue to monitor for any clinical  evidence of retroperitoneal hematoma extension or any evidence of hemodynamical changes  4-follow up at the vascular medicine clinic at either [place] or [facility] [place] [place] in 10 days and will repeat right lower extremity venous ultrasound prior to the OP visit, if there is evidence of extension of right lower extremity thrombus then patient will need an IVC filter       Code Status:  Full Code    I spent 30 minutes in the professional and overall care of this patient.        Ming Ricketts MD

## 2024-02-08 ENCOUNTER — APPOINTMENT (OUTPATIENT)
Dept: OBSTETRICS AND GYNECOLOGY | Facility: CLINIC | Age: 44
End: 2024-02-08
Payer: COMMERCIAL

## 2024-02-09 LAB
BACTERIA UR CULT: ABNORMAL
LAB AP ASR DISCLAIMER: NORMAL
LABORATORY COMMENT REPORT: NORMAL
PATH REPORT.FINAL DX SPEC: NORMAL
PATH REPORT.GROSS SPEC: NORMAL
PATH REPORT.RELEVANT HX SPEC: NORMAL
PATH REPORT.TOTAL CANCER: NORMAL

## 2024-02-09 RX ORDER — SULFAMETHOXAZOLE AND TRIMETHOPRIM 800; 160 MG/1; MG/1
1 TABLET ORAL 2 TIMES DAILY
Qty: 6 TABLET | Refills: 0 | Status: SHIPPED | OUTPATIENT
Start: 2024-02-09 | End: 2024-02-12

## 2024-02-09 NOTE — SIGNIFICANT EVENT
UTI    Pt with E. Coli UTI. Rx sent for Bactrim. Pt called and made aware.    Stewart Medel MD  Gynecology (p. [phone])

## 2024-02-13 ENCOUNTER — OFFICE VISIT (OUTPATIENT)
Dept: OBSTETRICS AND GYNECOLOGY | Facility: CLINIC | Age: 44
End: 2024-02-13
Payer: COMMERCIAL

## 2024-02-13 VITALS
SYSTOLIC BLOOD PRESSURE: 104 MMHG | HEIGHT: 69 IN | BODY MASS INDEX: 43.4 KG/M2 | WEIGHT: 293 LBS | DIASTOLIC BLOOD PRESSURE: 70 MMHG

## 2024-02-13 DIAGNOSIS — N99.820 POSTPROCEDURAL HEMORRHAGE OF A GENITOURINARY SYSTEM ORGAN OR STRUCTURE FOLLOWING A GENITOURINARY SYSTEM PROCEDURE: ICD-10-CM

## 2024-02-13 DIAGNOSIS — Z48.89 ENCOUNTER FOR POSTOPERATIVE CARE: Primary | ICD-10-CM

## 2024-02-13 LAB
ERYTHROCYTE [DISTWIDTH] IN BLOOD BY AUTOMATED COUNT: 19.9 % (ref 11.5–14.5)
HCT VFR BLD AUTO: 46 % (ref 36–46)
HGB BLD-MCNC: 13.6 G/DL (ref 12–16)
MCH RBC QN AUTO: 24.1 PG (ref 26–34)
MCHC RBC AUTO-ENTMCNC: 29.6 G/DL (ref 32–36)
MCV RBC AUTO: 82 FL (ref 80–100)
NRBC BLD-RTO: 0 /100 WBCS (ref 0–0)
PLATELET # BLD AUTO: 586 X10*3/UL (ref 150–450)
RBC # BLD AUTO: 5.64 X10*6/UL (ref 4–5.2)
WBC # BLD AUTO: 11.7 X10*3/UL (ref 4.4–11.3)

## 2024-02-13 PROCEDURE — 36415 COLL VENOUS BLD VENIPUNCTURE: CPT

## 2024-02-13 PROCEDURE — 3074F SYST BP LT 130 MM HG: CPT | Performed by: OBSTETRICS & GYNECOLOGY

## 2024-02-13 PROCEDURE — 1036F TOBACCO NON-USER: CPT | Performed by: OBSTETRICS & GYNECOLOGY

## 2024-02-13 PROCEDURE — 85027 COMPLETE CBC AUTOMATED: CPT

## 2024-02-13 PROCEDURE — 99024 POSTOP FOLLOW-UP VISIT: CPT | Performed by: OBSTETRICS & GYNECOLOGY

## 2024-02-13 PROCEDURE — 3078F DIAST BP <80 MM HG: CPT | Performed by: OBSTETRICS & GYNECOLOGY

## 2024-02-13 NOTE — PROGRESS NOTES
"Subjective   Patient ID: Katarina Merino is a 43 y.o. female who presents for Post-op and Follow-up (ER, Hospital Stay ).    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  Will see Dr. Hidalgo who she knows  Week after surgery patient with postop hemorrhage  Was in shock and admitted to [facility] then transferred to [facility]  Did have embolization of vessels by an interventional radiology    CBC stable at 9.5.  For CBC today      ROS  All Normal Review of Systems  Constitutional: no fever, no chills, no recent weight gain, no recent weight loss and no fatigue.    Gastrointestinal: no abdominal pain, no constipation, no nausea, no diarrhea and no vomiting.    Genitourinary: no dysuria, no urinary incontinence, no vaginal dryness, no vaginal itching, no dyspareunia, no pelvic pain, no dysmenorrhea, no sexual problems, no change in urinary frequency, no vaginal discharge, no unexplained vaginal bleeding and no lesion/sore.    Breasts: No masses.  No nipple discharge.  No redness    Objective   /70   Ht 1.753 m (5' 9\")   Wt 147 kg (324 lb)   LMP 01/07/2024   BMI 47.85 kg/m²    Physical Exam  General: No distress.  Alert and oriented  Incisions: Clear dry intact without erythema  Abdomen: Soft, nontender, nondistended  Extremities: No swelling  Psych: No sadness.      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  Referral to  GYN oncology for evaluation    2) delayed postop hemorrhage a week after procedure  Has visit with hematology to evaluate her Coumadin use  CBC today to assess stability of H&H    Thank you for your visit to our office today  I encourage a 20 pound restriction and pelvic rest for another 6 weeks  I will look forward to seeing you in 1 month  Please follow-up with Dr. Hidalgo for evaluation         "

## 2024-02-14 ENCOUNTER — HOSPITAL ENCOUNTER (OUTPATIENT)
Dept: CARDIOLOGY | Facility: HOSPITAL | Age: 44
Discharge: HOME | End: 2024-02-14
Payer: COMMERCIAL

## 2024-02-14 DIAGNOSIS — I82.591 CHRONIC EMBOLISM AND THROMBOSIS OF OTHER SPECIFIED DEEP VEIN OF RIGHT LOWER EXTREMITY (MULTI): ICD-10-CM

## 2024-02-14 DIAGNOSIS — Z86.718 HISTORY OF DVT (DEEP VEIN THROMBOSIS): ICD-10-CM

## 2024-02-14 PROCEDURE — 93971 EXTREMITY STUDY: CPT | Performed by: SURGERY

## 2024-02-14 PROCEDURE — 93971 EXTREMITY STUDY: CPT

## 2024-02-14 NOTE — PROGRESS NOTES
Virtual or Telephone Consent    A telephone visit (audio only) between the patient (at the originating site) and the provider (at the distant site) was utilized to provide this telehealth service.     Verbal consent was requested and obtained from Katarina Merino on this date, 02/15/24 for a telehealth visit.     Subjective   Patient ID: Katarina Merino is a 43 y.o. female who presents for virtual follow up visit    HPI    43 y.o. female presenting with  known chronic DVT. DVT was diagnosed back in 2018 and then when she went back in 2020 they told her that there were chronic changes in the right lower extremity and she was continued on AC. She is s/p robotic total hysterectomy, LSO, right salpingectomy for AUB-L [place] [facility] [place] the s/o 17 cm retroperitoneal hematoma s/p bilateral UAE 2/1 with concern for continued intraabdominal bleeding. She was not started on anticoagulation at the hospital because of peritoneal hematoma.  Today she follows up virtually after hospital discharge. Patient denies any right leg pain, leg swelling or discomfort. She denies SOB, chest pain and palpitations and reports that she is doing well.     Vascular studies:  Right Lower Venous: No evidence of acute deep vein thrombus visualized in the right lower extremity. There is age indeterminate deep vein thrombosis visualized in the distal external iliac and common femoral veins. The remainder of the right leg is negative for deep vein thrombosis.  Left Lower Venous: Left common femoral vein is negative for deep vein thrombus.    Review of Systems   Constitutional: Negative.    HENT: Negative.     Eyes: Negative.    Respiratory:  Negative for shortness of breath.    Cardiovascular: Negative.  Negative for chest pain, palpitations and leg swelling.   Gastrointestinal: Negative.    Musculoskeletal: Negative.    Skin: Negative.    Neurological: Negative.    Hematological: Negative.    Psychiatric/Behavioral: Negative.     All other  "systems reviewed and are negative.      Objective   /76 (BP Location: Left arm, Patient Position: Sitting)   Pulse 80   Ht 1.753 m (5' 9\")   Wt 132 kg (290 lb)   LMP 01/07/2024   BMI 42.83 kg/m²     Physical Exam  Visit was conducted virtually    Assessment/Plan   Diagnoses and all orders for this visit:  History of DVT (deep vein thrombosis)  -     Vascular US lower extremity venous duplex right; Future  Hemorrhagic stroke (CMS/HCC)  -     von Willebrand Factor, Activity (Ristocetin Cofactor); Future    43 y.o. female presenting with  known chronic DVT. DVT was diagnosed back in 2018 and then when she went back in 2020 they told her that there were chronic changes in the right lower extremity and she was continued on AC. She is s/p robotic total hysterectomy, LSO, right salpingectomy for AUB-L transferred to [facility] ICU in the s/o 17 cm retroperitoneal hematoma s/p bilateral UAE 2/1 with concern for continued intraabdominal bleeding. She was not started on anticoagulation at the hospital because of peritoneal hematoma.  Today she follows up virtually after hospital discharge. Patient is completely asymptomatic. Her leg pain and swelling has resolved, she denies any shortness of breath, chest pain or palpitations.      1- Will schedule her for a right lower extremity vascular US to rule out any progression in the chronic DVT. If DVT has progressed, patient will likely need an IVC filter.  2- Will get blood work for von Willebrand factor activity patient mentioned that she has very strong family history of factor VIII/von Willebrand's factor deficiency  2- Follow up with imaging results    Scribe Attestation  By signing my name below, IPriyanka , Meghan   attest that this documentation has been prepared under the direction and in the presence of Ming Ricketts MD.    "

## 2024-02-15 ENCOUNTER — TELEMEDICINE (OUTPATIENT)
Dept: CARDIOLOGY | Facility: CLINIC | Age: 44
End: 2024-02-15
Payer: COMMERCIAL

## 2024-02-15 VITALS
SYSTOLIC BLOOD PRESSURE: 110 MMHG | BODY MASS INDEX: 42.95 KG/M2 | HEIGHT: 69 IN | HEART RATE: 80 BPM | WEIGHT: 290 LBS | DIASTOLIC BLOOD PRESSURE: 76 MMHG

## 2024-02-15 DIAGNOSIS — Z86.718 HISTORY OF DVT (DEEP VEIN THROMBOSIS): Primary | ICD-10-CM

## 2024-02-15 DIAGNOSIS — I61.9 HEMORRHAGIC STROKE (MULTI): ICD-10-CM

## 2024-02-15 PROCEDURE — 99213 OFFICE O/P EST LOW 20 MIN: CPT | Performed by: INTERNAL MEDICINE

## 2024-02-15 PROCEDURE — 1036F TOBACCO NON-USER: CPT | Performed by: INTERNAL MEDICINE

## 2024-02-15 PROCEDURE — 3074F SYST BP LT 130 MM HG: CPT | Performed by: INTERNAL MEDICINE

## 2024-02-15 PROCEDURE — 3078F DIAST BP <80 MM HG: CPT | Performed by: INTERNAL MEDICINE

## 2024-02-15 ASSESSMENT — ENCOUNTER SYMPTOMS
SHORTNESS OF BREATH: 0
EYES NEGATIVE: 1
NEUROLOGICAL NEGATIVE: 1
CONSTITUTIONAL NEGATIVE: 1
HEMATOLOGIC/LYMPHATIC NEGATIVE: 1
GASTROINTESTINAL NEGATIVE: 1
CARDIOVASCULAR NEGATIVE: 1
PALPITATIONS: 0
MUSCULOSKELETAL NEGATIVE: 1
PSYCHIATRIC NEGATIVE: 1

## 2024-02-18 ENCOUNTER — HOSPITAL ENCOUNTER (EMERGENCY)
Facility: HOSPITAL | Age: 44
Discharge: HOME | End: 2024-02-18
Attending: STUDENT IN AN ORGANIZED HEALTH CARE EDUCATION/TRAINING PROGRAM
Payer: COMMERCIAL

## 2024-02-18 ENCOUNTER — APPOINTMENT (OUTPATIENT)
Dept: CARDIOLOGY | Facility: HOSPITAL | Age: 44
End: 2024-02-18
Payer: COMMERCIAL

## 2024-02-18 ENCOUNTER — APPOINTMENT (OUTPATIENT)
Dept: RADIOLOGY | Facility: HOSPITAL | Age: 44
End: 2024-02-18
Payer: COMMERCIAL

## 2024-02-18 VITALS
HEART RATE: 92 BPM | OXYGEN SATURATION: 95 % | SYSTOLIC BLOOD PRESSURE: 129 MMHG | WEIGHT: 290 LBS | HEIGHT: 69 IN | BODY MASS INDEX: 42.95 KG/M2 | TEMPERATURE: 98.1 F | DIASTOLIC BLOOD PRESSURE: 78 MMHG | RESPIRATION RATE: 20 BRPM

## 2024-02-18 DIAGNOSIS — R11.2 NAUSEA AND VOMITING, UNSPECIFIED VOMITING TYPE: ICD-10-CM

## 2024-02-18 DIAGNOSIS — R31.9 URINARY TRACT INFECTION WITH HEMATURIA, SITE UNSPECIFIED: Primary | ICD-10-CM

## 2024-02-18 DIAGNOSIS — N39.0 URINARY TRACT INFECTION WITH HEMATURIA, SITE UNSPECIFIED: Primary | ICD-10-CM

## 2024-02-18 LAB
ALBUMIN SERPL BCP-MCNC: 4.1 G/DL (ref 3.4–5)
ALP SERPL-CCNC: 85 U/L (ref 33–110)
ALT SERPL W P-5'-P-CCNC: 18 U/L (ref 7–45)
ANION GAP SERPL CALC-SCNC: 13 MMOL/L (ref 10–20)
APPEARANCE UR: ABNORMAL
AST SERPL W P-5'-P-CCNC: 18 U/L (ref 9–39)
BACTERIA #/AREA URNS AUTO: ABNORMAL /HPF
BASOPHILS # BLD AUTO: 0.01 X10*3/UL (ref 0–0.1)
BASOPHILS NFR BLD AUTO: 0.1 %
BILIRUB SERPL-MCNC: 0.9 MG/DL (ref 0–1.2)
BILIRUB UR STRIP.AUTO-MCNC: NEGATIVE MG/DL
BUN SERPL-MCNC: 10 MG/DL (ref 6–23)
CALCIUM SERPL-MCNC: 9.9 MG/DL (ref 8.6–10.3)
CHLORIDE SERPL-SCNC: 103 MMOL/L (ref 98–107)
CO2 SERPL-SCNC: 24 MMOL/L (ref 21–32)
COLOR UR: ABNORMAL
CREAT SERPL-MCNC: 0.8 MG/DL (ref 0.5–1.05)
EGFRCR SERPLBLD CKD-EPI 2021: >90 ML/MIN/1.73M*2
EOSINOPHIL # BLD AUTO: 0.13 X10*3/UL (ref 0–0.7)
EOSINOPHIL NFR BLD AUTO: 1.4 %
ERYTHROCYTE [DISTWIDTH] IN BLOOD BY AUTOMATED COUNT: 17.2 % (ref 11.5–14.5)
FLUAV RNA RESP QL NAA+PROBE: NOT DETECTED
FLUBV RNA RESP QL NAA+PROBE: NOT DETECTED
GLUCOSE SERPL-MCNC: 103 MG/DL (ref 74–99)
GLUCOSE UR STRIP.AUTO-MCNC: ABNORMAL MG/DL
HCT VFR BLD AUTO: 41.5 % (ref 36–46)
HGB BLD-MCNC: 12.7 G/DL (ref 12–16)
HOLD SPECIMEN: NORMAL
IMM GRANULOCYTES # BLD AUTO: 0.03 X10*3/UL (ref 0–0.7)
IMM GRANULOCYTES NFR BLD AUTO: 0.3 % (ref 0–0.9)
KETONES UR STRIP.AUTO-MCNC: ABNORMAL MG/DL
LEUKOCYTE ESTERASE UR QL STRIP.AUTO: ABNORMAL
LIPASE SERPL-CCNC: 16 U/L (ref 9–82)
LYMPHOCYTES # BLD AUTO: 0.69 X10*3/UL (ref 1.2–4.8)
LYMPHOCYTES NFR BLD AUTO: 7.4 %
MAGNESIUM SERPL-MCNC: 1.81 MG/DL (ref 1.6–2.4)
MCH RBC QN AUTO: 23.5 PG (ref 26–34)
MCHC RBC AUTO-ENTMCNC: 30.6 G/DL (ref 32–36)
MCV RBC AUTO: 77 FL (ref 80–100)
MONOCYTES # BLD AUTO: 0.32 X10*3/UL (ref 0.1–1)
MONOCYTES NFR BLD AUTO: 3.4 %
NEUTROPHILS # BLD AUTO: 8.17 X10*3/UL (ref 1.2–7.7)
NEUTROPHILS NFR BLD AUTO: 87.4 %
NITRITE UR QL STRIP.AUTO: NEGATIVE
NRBC BLD-RTO: 0 /100 WBCS (ref 0–0)
PH UR STRIP.AUTO: 5 [PH]
PLATELET # BLD AUTO: 485 X10*3/UL (ref 150–450)
POTASSIUM SERPL-SCNC: 4.8 MMOL/L (ref 3.5–5.3)
PROT SERPL-MCNC: 8.5 G/DL (ref 6.4–8.2)
PROT UR STRIP.AUTO-MCNC: ABNORMAL MG/DL
RBC # BLD AUTO: 5.4 X10*6/UL (ref 4–5.2)
RBC # UR STRIP.AUTO: ABNORMAL /UL
RBC #/AREA URNS AUTO: >20 /HPF
SARS-COV-2 RNA RESP QL NAA+PROBE: NOT DETECTED
SODIUM SERPL-SCNC: 135 MMOL/L (ref 136–145)
SP GR UR STRIP.AUTO: 1.04
SQUAMOUS #/AREA URNS AUTO: ABNORMAL /HPF
UROBILINOGEN UR STRIP.AUTO-MCNC: 2 MG/DL
WBC # BLD AUTO: 9.4 X10*3/UL (ref 4.4–11.3)
WBC #/AREA URNS AUTO: >50 /HPF

## 2024-02-18 PROCEDURE — 96361 HYDRATE IV INFUSION ADD-ON: CPT

## 2024-02-18 PROCEDURE — 83690 ASSAY OF LIPASE: CPT | Performed by: STUDENT IN AN ORGANIZED HEALTH CARE EDUCATION/TRAINING PROGRAM

## 2024-02-18 PROCEDURE — 74176 CT ABD & PELVIS W/O CONTRAST: CPT

## 2024-02-18 PROCEDURE — 87086 URINE CULTURE/COLONY COUNT: CPT | Mod: STJLAB | Performed by: STUDENT IN AN ORGANIZED HEALTH CARE EDUCATION/TRAINING PROGRAM

## 2024-02-18 PROCEDURE — 96374 THER/PROPH/DIAG INJ IV PUSH: CPT

## 2024-02-18 PROCEDURE — 2500000004 HC RX 250 GENERAL PHARMACY W/ HCPCS (ALT 636 FOR OP/ED): Performed by: STUDENT IN AN ORGANIZED HEALTH CARE EDUCATION/TRAINING PROGRAM

## 2024-02-18 PROCEDURE — 99285 EMERGENCY DEPT VISIT HI MDM: CPT | Performed by: STUDENT IN AN ORGANIZED HEALTH CARE EDUCATION/TRAINING PROGRAM

## 2024-02-18 PROCEDURE — 2500000004 HC RX 250 GENERAL PHARMACY W/ HCPCS (ALT 636 FOR OP/ED)

## 2024-02-18 PROCEDURE — 36415 COLL VENOUS BLD VENIPUNCTURE: CPT | Performed by: STUDENT IN AN ORGANIZED HEALTH CARE EDUCATION/TRAINING PROGRAM

## 2024-02-18 PROCEDURE — 74176 CT ABD & PELVIS W/O CONTRAST: CPT | Mod: FOREIGN READ | Performed by: RADIOLOGY

## 2024-02-18 PROCEDURE — 83735 ASSAY OF MAGNESIUM: CPT | Performed by: STUDENT IN AN ORGANIZED HEALTH CARE EDUCATION/TRAINING PROGRAM

## 2024-02-18 PROCEDURE — 81001 URINALYSIS AUTO W/SCOPE: CPT | Performed by: STUDENT IN AN ORGANIZED HEALTH CARE EDUCATION/TRAINING PROGRAM

## 2024-02-18 PROCEDURE — 93005 ELECTROCARDIOGRAM TRACING: CPT

## 2024-02-18 PROCEDURE — 84075 ASSAY ALKALINE PHOSPHATASE: CPT | Performed by: STUDENT IN AN ORGANIZED HEALTH CARE EDUCATION/TRAINING PROGRAM

## 2024-02-18 PROCEDURE — 87636 SARSCOV2 & INF A&B AMP PRB: CPT | Performed by: STUDENT IN AN ORGANIZED HEALTH CARE EDUCATION/TRAINING PROGRAM

## 2024-02-18 PROCEDURE — 2550000001 HC RX 255 CONTRASTS: Performed by: STUDENT IN AN ORGANIZED HEALTH CARE EDUCATION/TRAINING PROGRAM

## 2024-02-18 PROCEDURE — 85025 COMPLETE CBC W/AUTO DIFF WBC: CPT | Performed by: STUDENT IN AN ORGANIZED HEALTH CARE EDUCATION/TRAINING PROGRAM

## 2024-02-18 PROCEDURE — 99285 EMERGENCY DEPT VISIT HI MDM: CPT | Mod: 25

## 2024-02-18 RX ORDER — ONDANSETRON HYDROCHLORIDE 2 MG/ML
4 INJECTION, SOLUTION INTRAVENOUS ONCE
Status: COMPLETED | OUTPATIENT
Start: 2024-02-18 | End: 2024-02-18

## 2024-02-18 RX ORDER — SULFAMETHOXAZOLE AND TRIMETHOPRIM 800; 160 MG/1; MG/1
1 TABLET ORAL 2 TIMES DAILY
Qty: 14 TABLET | Refills: 0 | Status: SHIPPED | OUTPATIENT
Start: 2024-02-18 | End: 2024-02-25

## 2024-02-18 RX ORDER — ONDANSETRON 4 MG/1
4 TABLET, ORALLY DISINTEGRATING ORAL EVERY 8 HOURS PRN
Qty: 3 TABLET | Refills: 0 | Status: SHIPPED | OUTPATIENT
Start: 2024-02-18 | End: 2024-02-26 | Stop reason: WASHOUT

## 2024-02-18 RX ADMIN — IOHEXOL 75 ML: 350 INJECTION, SOLUTION INTRAVENOUS at 14:09

## 2024-02-18 RX ADMIN — ONDANSETRON 4 MG: 2 INJECTION INTRAMUSCULAR; INTRAVENOUS at 14:04

## 2024-02-18 RX ADMIN — SODIUM CHLORIDE 1000 ML: 9 INJECTION, SOLUTION INTRAVENOUS at 13:23

## 2024-02-18 ASSESSMENT — COLUMBIA-SUICIDE SEVERITY RATING SCALE - C-SSRS
6. HAVE YOU EVER DONE ANYTHING, STARTED TO DO ANYTHING, OR PREPARED TO DO ANYTHING TO END YOUR LIFE?: NO
1. IN THE PAST MONTH, HAVE YOU WISHED YOU WERE DEAD OR WISHED YOU COULD GO TO SLEEP AND NOT WAKE UP?: NO
2. HAVE YOU ACTUALLY HAD ANY THOUGHTS OF KILLING YOURSELF?: NO

## 2024-02-18 ASSESSMENT — PAIN SCALES - GENERAL: PAINLEVEL_OUTOF10: 0 - NO PAIN

## 2024-02-18 ASSESSMENT — LIFESTYLE VARIABLES
EVER HAD A DRINK FIRST THING IN THE MORNING TO STEADY YOUR NERVES TO GET RID OF A HANGOVER: NO
EVER FELT BAD OR GUILTY ABOUT YOUR DRINKING: NO
HAVE PEOPLE ANNOYED YOU BY CRITICIZING YOUR DRINKING: NO
HAVE YOU EVER FELT YOU SHOULD CUT DOWN ON YOUR DRINKING: NO

## 2024-02-18 ASSESSMENT — PAIN - FUNCTIONAL ASSESSMENT: PAIN_FUNCTIONAL_ASSESSMENT: 0-10

## 2024-02-18 NOTE — DISCHARGE INSTRUCTIONS
You do have a urinary tract infection in your urine.  Will give you similar antibiotics that you were given last time you will take twice a day.  Please be sure you finish this antibiotic.    For your nausea, or given you Zofran tablets to take as needed for the nausea.  It is important that you remain well-hydrated drink plenty of fluids to recuperate.    Please return if you have bleeding that is in excess of what your OB/GYN recommended for follow-up.  Please return if you are unable to drink fluids, have trouble breathing or shortness of breath, or chest pain.    Please follow-up with your primary care doctor and your OB/GYN following her ED admission.

## 2024-02-19 ENCOUNTER — HOSPITAL ENCOUNTER (OUTPATIENT)
Dept: CARDIOLOGY | Facility: HOSPITAL | Age: 44
Discharge: HOME | End: 2024-02-19
Payer: COMMERCIAL

## 2024-02-19 DIAGNOSIS — Z86.718 HISTORY OF DVT (DEEP VEIN THROMBOSIS): ICD-10-CM

## 2024-02-19 DIAGNOSIS — I82.491 ACUTE EMBOLISM AND THROMBOSIS OF OTHER SPECIFIED DEEP VEIN OF RIGHT LOWER EXTREMITY (MULTI): ICD-10-CM

## 2024-02-19 LAB
ATRIAL RATE: 87 BPM
P AXIS: 25 DEGREES
P OFFSET: 206 MS
P ONSET: 143 MS
PR INTERVAL: 156 MS
Q ONSET: 221 MS
QRS COUNT: 14 BEATS
QRS DURATION: 74 MS
QT INTERVAL: 330 MS
QTC CALCULATION(BAZETT): 397 MS
QTC FREDERICIA: 373 MS
R AXIS: 2 DEGREES
T AXIS: 9 DEGREES
T OFFSET: 386 MS
VENTRICULAR RATE: 87 BPM

## 2024-02-19 PROCEDURE — 93971 EXTREMITY STUDY: CPT

## 2024-02-19 PROCEDURE — 93971 EXTREMITY STUDY: CPT | Performed by: SURGERY

## 2024-02-19 NOTE — ED PROVIDER NOTES
HPI   Chief Complaint   Patient presents with    Vomiting       Katarina is a 43-year-old female presenting to the emergency department after nausea and vomiting that started earlier this morning.  She is not having any other significant abdominal pain, no chest pain, no shortness of breath, no rhinorrhea, no cough.  No dizziness or lightheadedness.  No weakness.  Recent hysterectomy on .  She does have some vaginal bleeding after her recent hysterectomy, but her OB/GYN told her as long as it does not soak through a pad it is expected postop bleeding.  She has not had bleeding worsen.  No bleeding in her urine.  No urinary frequency.  No diarrhea, no blood in her stool.  The nausea started just this morning and she has had trouble keeping fluids down.  She does have a recent hospitalization where she was in the ICU with a retroperitoneal hematoma.  She does have a history of DVT from several years ago and history of hemorrhagic CVA from several years ago.  Non-smoker.  From home with her .  Recently was on Bactrim for urinary tract infection but did not finish the antibiotic course.                          Thomas Coma Scale Score: 15                     Patient History   Past Medical History:   Diagnosis Date    DVT of lower extremity (deep venous thrombosis) (CMS/[facility])     RLE -  and     Fibroid     Hyperlipidemia     Hypertension     Iron deficiency anemia due to chronic blood loss     Long term current use of anticoagulant     warfarin -  follows with Dr. So Escamilla    Menorrhagia with regular cycle     Stroke (CMS/[facility]) 2019    ~ 6 weeks post partum, residual right sided weekness    Vision loss     glasses     Past Surgical History:   Procedure Laterality Date     SECTION, LOW TRANSVERSE      CT HEAD ANGIO W AND WO IV CONTRAST  10/08/2019    CT HEAD ANGIO W AND WO IV CONTRAST 10/8/2019 [facility]    LAPAROSCOPIC HYSTERECTOMY      RLTH, LSO , B salpingectomy    OOPHORECTOMY Left      w RTH    TONSILLECTOMY      UMBILICAL HERNIA REPAIR      WISDOM TOOTH EXTRACTION       Family History   Problem Relation Name Age of Onset    Diabetes Mother      Hypertension Mother      Hypertension Father      Hyperlipidemia Father      Hypertension Brother      Hyperlipidemia Brother      Breast cancer Other Aunt      Social History     Tobacco Use    Smoking status: Never    Smokeless tobacco: Never   Vaping Use    Vaping Use: Never used   Substance Use Topics    Alcohol use: Not Currently     Alcohol/week: 2.0 standard drinks of alcohol     Types: 2 Standard drinks or equivalent per week    Drug use: Never       Physical Exam   ED Triage Vitals [02/18/24 1226]   Temperature Heart Rate Respirations BP   36.6 °C (97.9 °F) 94 18 168/86      Pulse Ox Temp Source Heart Rate Source Patient Position   100 % Temporal Monitor Sitting      BP Location FiO2 (%)     Right arm --       Physical Exam  Vitals and nursing note reviewed.   Constitutional:       General: She is not in acute distress.     Appearance: Normal appearance. She is not toxic-appearing.   HENT:      Head: Normocephalic.      Right Ear: External ear normal.      Left Ear: External ear normal.      Nose: Nose normal.      Mouth/Throat:      Mouth: Mucous membranes are moist.      Pharynx: Oropharynx is clear.   Eyes:      Conjunctiva/sclera: Conjunctivae normal.   Cardiovascular:      Rate and Rhythm: Normal rate and regular rhythm.      Pulses: Normal pulses.      Heart sounds: Normal heart sounds. No murmur heard.  Pulmonary:      Effort: Pulmonary effort is normal. No respiratory distress.      Breath sounds: Normal breath sounds. No wheezing, rhonchi or rales.   Abdominal:      General: Abdomen is flat. Bowel sounds are normal.      Palpations: Abdomen is soft. There is no mass.      Tenderness: There is abdominal tenderness (Mild suprapubic tenderness). There is no right CVA tenderness, left CVA tenderness or guarding.      Hernia: No hernia is  present.   Musculoskeletal:         General: Normal range of motion.      Right lower leg: No edema.      Left lower leg: No edema.   Skin:     General: Skin is warm and dry.      Capillary Refill: Capillary refill takes less than 2 seconds.   Neurological:      General: No focal deficit present.      Mental Status: She is alert and oriented to person, place, and time.   Psychiatric:         Mood and Affect: Mood normal.         Behavior: Behavior normal.         ED Course & [facility]   ED Course as of 02/18/24 1926   Sun Feb 18, 2024   1442 Attending note    43-year-old female with history of hysterectomy on 1/24/2024 and subsequent bilateral uterine artery embolization for bleeding and retroperitoneal bleed who presents with complaint of nausea/vomiting that started today.  Denies any fevers, chills, night sweats, chest pain, shortness of breath, abdominal pain.    Nontoxic-appearing female, no acute distress.  Given the patient's complaints, will obtain lab work.  Will give the patient fluid and Zofran.  Additionally, given her recent complicated abdominal history, will obtain a CT of the ab/pelvis.    Lab work revealed no leukocytosis.  Viral swabs are negative.  Chemistry is unremarkable.  Patient signed out to Dr. Aguirre, attending pending CT of the ab/pelvis and reevaluation. [AI]      ED Course User Index  [AI] Dhaval Bravo, DO         Diagnoses as of 02/18/24 1926   Nausea and vomiting, unspecified vomiting type   Urinary tract infection with hematuria, site unspecified       Medical Decision Making  Katarina is a 43-year-old female presenting to the emergency department after having nausea and vomiting.    Nontoxic appearing female, in no acute distress. On presentation to the emergency department she is hemodynamically stable and in no acute distress.  Exam is otherwise benign other than some mild suprapubic tenderness.  No significant tenderness elsewhere in her abdomen, no rebound or guarding, no CVA tenderness.   She does have a significant medical history of hemorrhage recently.  Ordered a CT abdomen and pelvis which showed surgical hysterectomy changes, decreased free fluid from prior study, no significant bleeding.  Did show mild right-sided pleural effusion.  Patient had no respiratory symptoms and was satting well on room air during her stay.  She remained afebrile.  CBC showed no leukocytosis and normal hemoglobin.  CMP showed sodium of 135 but was otherwise normal.  Patient was COVID and flu negative.  UA did show blood which is likely from her postop vaginal bleeding, ketones, bacteria.  Also showed possible early ileus.  Given patient's presentation treated for urinary tract infection.  Patient did receive 1 L of IV fluids during her stay and got Zofran.  Patient was able to tolerate oral fluids prior to leaving.  She was told to start with a liquid diet and advance as tolerated.  Prescription written for Bactrim as previous urine culture shows sensitivities to Bactrim and she has not finished her course for previous UTI.  Prescription given for Zofran for nausea.  Return precautions discussed with patient.  Patient to follow-up with her OB/GYN in the next 2 days.  Patient discharged home final impression of urinary tract infection with her  in stable condition.  Given strict return precautions.        Procedure  Procedures     Fernando Gracia DO  Resident  02/18/24 2023    The patient was seen by the resident/fellow.  I have personally performed a substantive portion of the encounter.  I have seen and examined the patient; agree with the workup, evaluation, MDM, management and diagnosis.  The care plan has been discussed with the resident; I have reviewed the resident’s note and agree with the documented findings.           Dhaval Bravo DO  02/19/24 0133

## 2024-02-20 LAB — BACTERIA UR CULT: ABNORMAL

## 2024-02-21 ENCOUNTER — TELEPHONE (OUTPATIENT)
Dept: PHARMACY | Facility: HOSPITAL | Age: 44
End: 2024-02-21
Payer: COMMERCIAL

## 2024-02-21 NOTE — PROGRESS NOTES
EDPD Note: Duration Adjust    Contacted Katarina Merino regarding a multiple organisms present, probable contamination urine culture/result that was taken during their recent emergency room visit. I completed education with  patient . The patient was given an antibiotic at discharge; however, the duration of the discharge prescription is incorrect. I gave verbal education about the new medication duration found below. Patient indicated they are currently asymptomatic for a Urinary Tract Infection (UTI) and indicated symptoms in the ED have improved. Instructed patient that antibiotic could be stopped if asymptomatic, but for uncomplicated UTI, explained duration should be reduced as noted below. No further follow up needed from EDPD Team.     Drug: sulfamethoxazole/trimethoprim 800-160 mg tablet  Sig: Take 1 tablet PO BID x 3 days  Days Supply: 3 days     Contains abnormal data Urine Culture  Order: [id number] - Reflex for Order [id number]  Status: Final result       Visible to patient: Yes (seen)    Specimen Information: Clean Catch/Voided; Urine   1 Result Note       1 Follow-up Encounter  Urine Culture Multiple organisms present, probable contamination. Repeat culture if clinically indicated. Abnormal            Resulting Agency: [facility]           Specimen Collected: 02/18/24 17:40 Last Resulted: 02/20/24 07:08       Order Details        View Encounter        Lab and Collection Details        Routing        Result History     View All Conversations on this Encounter               Gisel Mclaughlin PharmD

## 2024-02-21 NOTE — PROGRESS NOTES
EDPD Note: Rapid Result Review    Reviewed Mrs. Katarina Merino 's chart regarding a multiple organisms present, probable contamination urine culture/result that was taken during their recent emergency room visit. The patient was not told about these results prior to leaving the emergency department. Therefore, patient was contacted and given proper education. Nausea, vomiting, and vaginal bleeding have improved. Patient denied any dysuria or development of other systemic symptoms at this time. Recent hysterectomy, which patient was instructed to follow up with OB-GYN regarding concerns after surgery while in ED. Advised patient, since currently asymptomatic for a UTI, sulfamethoxazole/trimethoprim 800-160 m tablet PO BID x 7 days could be stopped. Discussed if symptoms changed, patient should report to PCP or nearest Urgent Care for possible additional testing. Patient had no other questions for pharmacy.      Susceptibility data from last 90 days.  Collected Specimen Info Organism Ampicillin Cefazolin Cefazolin (uncomplicated UTIs only) Ciprofloxacin Gentamicin Nitrofurantoin Piperacillin/Tazobactam Trimethoprim/Sulfamethoxazole   24 Urine from Clean Catch/Voided Escherichia coli S S S R S S S S       No further follow up needed from EDPD Team.     Gisel Mclaughlin, SpikeD

## 2024-02-26 ENCOUNTER — OFFICE VISIT (OUTPATIENT)
Dept: GYNECOLOGIC ONCOLOGY | Facility: CLINIC | Age: 44
End: 2024-02-26
Payer: COMMERCIAL

## 2024-02-26 VITALS
SYSTOLIC BLOOD PRESSURE: 129 MMHG | TEMPERATURE: 97 F | RESPIRATION RATE: 16 BRPM | OXYGEN SATURATION: 96 % | BODY MASS INDEX: 43.01 KG/M2 | HEART RATE: 73 BPM | WEIGHT: 291.23 LBS | DIASTOLIC BLOOD PRESSURE: 90 MMHG

## 2024-02-26 DIAGNOSIS — N80.00 UTERUS, ADENOMYOSIS: Primary | ICD-10-CM

## 2024-02-26 DIAGNOSIS — O09.892 PREVIOUS DEEP VEIN THROMBOSIS (DVT) AFFECTING PREGNANCY IN SECOND TRIMESTER (HHS-HCC): ICD-10-CM

## 2024-02-26 DIAGNOSIS — I61.9 HEMORRHAGIC STROKE (MULTI): ICD-10-CM

## 2024-02-26 DIAGNOSIS — R87.69: ICD-10-CM

## 2024-02-26 DIAGNOSIS — Z86.718 PREVIOUS DEEP VEIN THROMBOSIS (DVT) AFFECTING PREGNANCY IN SECOND TRIMESTER (HHS-HCC): ICD-10-CM

## 2024-02-26 PROCEDURE — 3080F DIAST BP >= 90 MM HG: CPT | Performed by: OBSTETRICS & GYNECOLOGY

## 2024-02-26 PROCEDURE — 99214 OFFICE O/P EST MOD 30 MIN: CPT | Performed by: OBSTETRICS & GYNECOLOGY

## 2024-02-26 PROCEDURE — 1036F TOBACCO NON-USER: CPT | Performed by: OBSTETRICS & GYNECOLOGY

## 2024-02-26 PROCEDURE — 3074F SYST BP LT 130 MM HG: CPT | Performed by: OBSTETRICS & GYNECOLOGY

## 2024-02-26 ASSESSMENT — PAIN SCALES - GENERAL: PAINLEVEL: 0-NO PAIN

## 2024-02-26 NOTE — PROGRESS NOTES
Patient ID: Katarina Merino is a 43 y.o. female.  Referring Physician: No referring provider defined for this encounter.  Primary Care Provider: Lesly Mary,       Subjective    43 y.o. referred for pathology review    HPI  She is starting to feel fully recovered from her hysterectomy, she notices pain only when she is too active. Confirms she had a stroke in the past and was put on blood thinners, states she was taken off bloods thinners after complications after hysterectomy.  Also has remote h/o DVT in leg - 2016.  Had excessive bleeding after delivery of child as well - required transfusion      Pathology Hx  24 patient underwent total hysterectomy bilateral salpingectomy, left oopherectomy performed robotically, final pathology report identified bilateral fallopian tube with microscopic foci of sex cord proliferation, these foci were positive for SF1 and inhibin and negative for P40 and P53, pathogenesis of these proliferations is uncertain and spread from a granulosa cell tumor cannot be entirely excluded. Of note patient had complicated p/o course including rehospitalization with bleeding (she did not require reoperation)    PMH  Obesity  H/o DVT  H/o stroke - occurred post partum    PSH  Hysterectomy 26 secondary to fibroids    Ob/Gyn hx   x1  x2 vaginal deliveries    SH  Lives with her  and children and is employed by [employer]  Denies tobacco, occasional alcohol use, denies illicit drug use.    FH  Cousin and aunt Hx of breast cancer  No family history of uterine, ovarian or colon cancer.              -    Review of Systems   All other systems reviewed and are negative.       Objective   BSA: 2.53 meters squared  /90 (BP Location: Right arm, Patient Position: Sitting, BP Cuff Size: Adult)   Pulse 73   Temp 36.1 °C (97 °F) (Temporal)   Resp 16   Wt 132 kg (291 lb 3.6 oz)   LMP 2024   SpO2 96%   BMI 43.01 kg/m²      Family History   Problem Relation  Name Age of Onset    Diabetes Mother      Hypertension Mother      Hypertension Father      Hyperlipidemia Father      Hypertension Brother      Hyperlipidemia Brother      Breast cancer Other Aunt        Katarina Merino  reports that she has never smoked. She has never used smokeless tobacco.  She  reports that she does not currently use alcohol after a past usage of about 2.0 standard drinks of alcohol per week.  She  reports no history of drug use.    Physical Exam  Constitutional:       General: She is not in acute distress.     Appearance: Normal appearance.   Cardiovascular:      Rate and Rhythm: Normal rate and regular rhythm.   Pulmonary:      Effort: Pulmonary effort is normal.      Breath sounds: Normal breath sounds.   Abdominal:      General: Bowel sounds are normal. There is no distension.   Genitourinary:     Comments: Pelvic exam: no obvious masses on bimanual or rectovaginal exam.    Musculoskeletal:      Right forearm: Normal.      Left forearm: Normal.      Right hand: Normal.      Left hand: Normal.      Right lower leg: Normal.      Left lower leg: Normal.      Right foot: Normal.      Left foot: Normal.         Performance Status:  Symptomatic; fully ambulatory    Assessment/Plan      Oncology History    No history exists.          Problem List Items Addressed This Visit             ICD-10-CM    Hemorrhagic stroke (CMS/HCC) I61.9    Previous deep vein thrombosis (DVT) affecting pregnancy in second trimester O09.892, Z86.718    Uterus, adenomyosis - Primary N80.00    Relevant Orders    Tumor Board Request    Abnormal cytological findings in female genital organs R87.69       Treatment Plans       No treatment plans exist        - Reviewed pathology result and uncertain significance of proliferative cells.  Contralateral ovary visualized as normal during hysterectomy and no abnormal imaging findings to explain source of cells.  Patient is elevated surgical risk given her medical co-morbidities and  history of stroke, DVT, and post operative bleeding.  She is also pre-menopausal and not a good candidate for hormone replacement.  Discussed options including close surveillance of remaining ovary vs. Surgical exploration, USO.    Plan to review cytology and imaging at gyn TB.  Will discuss with patient during phone visit next week..      Scribe Attestation  By signing my name below, IYenifer, Meghan   attest that this documentation has been prepared under the direction and in the presence of Afshan Hidalgo MD.

## 2024-02-27 NOTE — TUMOR BOARD NOTE
Gynecologic Oncology Tumor Board 2024    Specialties Present: Gyn Onc, Radiology, Genetics, Radiation oncology, Pathology, Nurse Navigator, Research    Katarina Merino  43 y.o.    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    We reviewed previous medical and familial history, history of present illness, and recent lab results along with all available histopathologic and imaging studies. The tumor board considered available treatment options and made the following recommendations.    Recommendations:     Serial inhibin levels with both A/B, surveillance. No indication for OR. Consider genetics referral.   Clinical Trial Eligibility: Not discussed       National site-specific guidelines were discussed with respect to the case. It is recognized that there may be additional factors not discussed in the Review Board discussion that can influence management decisions, and alternative management options which fall within the standard of care. Accordingly the final treatment disposition will be determined by the patient, in the context of an informed discussion with their providers. The actual prescribed management or treatment plan may or may not be consistent with the Review Board recommendations.

## 2024-03-01 ENCOUNTER — TUMOR BOARD CONFERENCE (OUTPATIENT)
Dept: HEMATOLOGY/ONCOLOGY | Facility: HOSPITAL | Age: 44
End: 2024-03-01
Payer: COMMERCIAL

## 2024-03-04 ENCOUNTER — TELEMEDICINE (OUTPATIENT)
Dept: GYNECOLOGIC ONCOLOGY | Facility: CLINIC | Age: 44
End: 2024-03-04
Payer: COMMERCIAL

## 2024-03-04 ENCOUNTER — PATIENT MESSAGE (OUTPATIENT)
Dept: OBSTETRICS AND GYNECOLOGY | Facility: CLINIC | Age: 44
End: 2024-03-04

## 2024-03-04 DIAGNOSIS — R87.69: Primary | ICD-10-CM

## 2024-03-04 PROCEDURE — 99024 POSTOP FOLLOW-UP VISIT: CPT | Performed by: OBSTETRICS & GYNECOLOGY

## 2024-03-04 PROCEDURE — 1036F TOBACCO NON-USER: CPT | Performed by: OBSTETRICS & GYNECOLOGY

## 2024-03-04 NOTE — TELEPHONE ENCOUNTER
From: Katarina Merino  To: Kenroy Naidu MD  Sent: 3/4/2024 3:02 PM EST  Subject: Updated FMLA FORM    Can I get update from to send to my job. Thank you.

## 2024-03-04 NOTE — PROGRESS NOTES
Consent:  Verbal consent was requested and obtained from patient on this date for a telehealth visit.    Patient ID: Katarina Merino is a 43 y.o. female.  Referring Physician: No referring provider defined for this encounter.  Primary Care Provider: Lesly Mary DO    Subjective    43 y.o. referred for pathology review        Pathology Hx  24 patient underwent total hysterectomy bilateral salpingectomy, left oopherectomy performed robotically, final pathology report identified bilateral fallopian tube with microscopic foci of sex cord proliferation, these foci were positive for SF1 and inhibin and negative for P40 and P53, pathogenesis of these proliferations is uncertain and spread from a granulosa cell tumor cannot be entirely excluded. Of note patient had complicated p/o course including rehospitalization with bleeding (she did not require reoperation)  TB recs for close surveillance    PMH  Obesity  H/o DVT  H/o stroke - occurred post partum    PSH  Hysterectomy 26 secondary to fibroids    Ob/Gyn hx   x1  x2 vaginal deliveries    SH  Lives with her  and children and is employed by [employer]  Denies tobacco, occasional alcohol use, denies illicit drug use.    FH  Cousin and aunt Hx of breast cancer  No family history of uterine, ovarian or colon cancer.   HPI    Objective    BSA: There is no height or weight on file to calculate BSA.  LMP 2024      Physical Exam    Performance Status:  Asymptomatic    Assessment/Plan    Oncology History    No history exists.     Cancer Staging   No matching staging information was found for the patient.     Problem List Items Addressed This Visit             ICD-10-CM    Abnormal cytological findings in female genital organs - Primary R87.69    Relevant Orders    Inhibin B    Inhibin A    US pelvis transvaginal        Treatment Plans       No treatment plans exist        Reviewed TB recommendations.  Discussed plan for surveillance.   Recommend check inhibin A/B levels, RN to call with results.  Pelvic US in 6 months, office visit to follow.    I spent 15 min in telephone discussion with patient.

## 2024-03-04 NOTE — LETTER
Date: 2024  RE:  Katarina Merino  :  1980      To Whom It May Concern:    Our patient, Katarina, has been under our care and now may return back to work without restrictions.    Their return to work date is:  Pending follow up appointment on 2024    If you have questions concerning this patient's immediate care, please feel free to contact our office at [phone].    Sincerely,      Mica Huertas MA  Supervising Provider: Kenroy Naidu MD

## 2024-03-05 ASSESSMENT — ENCOUNTER SYMPTOMS
GASTROINTESTINAL NEGATIVE: 1
HEMATOLOGIC/LYMPHATIC NEGATIVE: 1
MUSCULOSKELETAL NEGATIVE: 1
EYES NEGATIVE: 1
PALPITATIONS: 0
NEUROLOGICAL NEGATIVE: 1
PSYCHIATRIC NEGATIVE: 1
CARDIOVASCULAR NEGATIVE: 1
CONSTITUTIONAL NEGATIVE: 1
SHORTNESS OF BREATH: 0

## 2024-03-06 ENCOUNTER — TELEMEDICINE (OUTPATIENT)
Dept: CARDIOLOGY | Facility: CLINIC | Age: 44
End: 2024-03-06
Payer: COMMERCIAL

## 2024-03-06 DIAGNOSIS — Z86.718 HISTORY OF DVT (DEEP VEIN THROMBOSIS): Primary | ICD-10-CM

## 2024-03-06 PROCEDURE — 99213 OFFICE O/P EST LOW 20 MIN: CPT | Performed by: INTERNAL MEDICINE

## 2024-03-06 PROCEDURE — 1036F TOBACCO NON-USER: CPT | Performed by: INTERNAL MEDICINE

## 2024-03-06 NOTE — PROGRESS NOTES
Virtual or Telephone Consent    A telephone visit (audio only) between the patient (at the originating site) and the provider (at the distant site) was utilized to provide this telehealth service.   Verbal consent was requested and obtained from Katarina Merino on this date, 03/06/24 for a telehealth visit.     Subjective   Patient ID: Katarina Merino is a 43 y.o. female who presents for follow up visit    [facility]    43 y.o. female presenting with known chronic DVT. DVT was diagnosed back in 2018 and then when she went back in 2020 they told her that there were chronic changes in the right lower extremity and she was continued on AC. She is s/p robotic total hysterectomy, LSO, right salpingectomy for AUB-L transferred to [facility] ICU in the s/o 17 cm retroperitoneal hematoma s/p bilateral UAE 2/1 with concern for continued intraabdominal bleeding. She was not started on anticoagulation at the hospital because of peritoneal hematoma.  Today she follows up virtually after hospital discharge. Patient denies any right leg pain, leg swelling or discomfort. She denies SOB, chest pain and palpitations and reports that she is doing well.     Vascular studies:  Right Lower Venous: No evidence of acute deep vein thrombus visualized in the right lower extremity. There is age indeterminate deep vein thrombosis visualized in the distal external iliac and common femoral veins. The remainder of the right leg is negative for deep vein thrombosis.  Left Lower Venous: Left common femoral vein is negative for deep vein thrombus.    Review of Systems   Constitutional: Negative.    HENT: Negative.     Eyes: Negative.    Respiratory:  Negative for shortness of breath.    Cardiovascular: Negative.  Negative for chest pain, palpitations and leg swelling.   Gastrointestinal: Negative.    Musculoskeletal: Negative.    Skin: Negative.    Neurological: Negative.    Hematological: Negative.    Psychiatric/Behavioral: Negative.     All other systems  reviewed and are negative.      Objective   LMP 01/07/2024     Physical Exam  Visit was conducted virtually    Assessment/Plan   Diagnoses and all orders for this visit:  History of DVT (deep vein thrombosis)  -     Vascular US Lower Extremity Venous Duplex Bilateral; Future  -     D-Dimer, VTE Exclusion; Future      43 y.o. female presenting with  known chronic DVT. DVT was diagnosed back in 2018 and then when she went back in 2020 they told her that there were chronic changes in the right lower extremity and she was continued on AC. She is s/p robotic total hysterectomy, LSO, right salpingectomy for AUB-L transferred to [facility] ICU in the s/o 17 cm retroperitoneal hematoma s/p bilateral UAE 2/1 with concern for continued intraabdominal bleeding. She was not started on anticoagulation at the hospital because of peritoneal hematoma.  Today she follows up virtually after hospital discharge. Patient is completely asymptomatic. Her leg pain and swelling has resolved, she denies any shortness of breath, chest pain or palpitations. Lab work came back negative for von Willebrand factor. No evidence of PE, size of DVT appears the same.  (No evidence of PE detected, hematoma is still evolving and no evidence of extension of the DVT if there is evidence of DVT extension to the pelvic veins then an IVC filter is indicated)     1- Will get a D-Dimer and lower extremity venous US to rule out any active clots.   2- Follow up with labs and imaging results    Scribe Attestation  By signing my name below, Priyanka OCAMPO , Scribe   attest that this documentation has been prepared under the direction and in the presence of Ming Ricketts MD.

## 2024-03-11 ENCOUNTER — APPOINTMENT (OUTPATIENT)
Dept: CARDIOLOGY | Facility: HOSPITAL | Age: 44
End: 2024-03-11
Payer: COMMERCIAL

## 2024-03-14 ENCOUNTER — OFFICE VISIT (OUTPATIENT)
Dept: OBSTETRICS AND GYNECOLOGY | Facility: CLINIC | Age: 44
End: 2024-03-14
Payer: COMMERCIAL

## 2024-03-14 ENCOUNTER — HOSPITAL ENCOUNTER (OUTPATIENT)
Dept: CARDIOLOGY | Facility: HOSPITAL | Age: 44
Discharge: HOME | End: 2024-03-14
Payer: COMMERCIAL

## 2024-03-14 ENCOUNTER — APPOINTMENT (OUTPATIENT)
Dept: CARDIOLOGY | Facility: CLINIC | Age: 44
End: 2024-03-14
Payer: COMMERCIAL

## 2024-03-14 VITALS
HEIGHT: 69 IN | WEIGHT: 291 LBS | SYSTOLIC BLOOD PRESSURE: 124 MMHG | DIASTOLIC BLOOD PRESSURE: 70 MMHG | BODY MASS INDEX: 43.1 KG/M2

## 2024-03-14 DIAGNOSIS — M79.89 OTHER SPECIFIED SOFT TISSUE DISORDERS: ICD-10-CM

## 2024-03-14 DIAGNOSIS — Z86.718 HISTORY OF DVT (DEEP VEIN THROMBOSIS): ICD-10-CM

## 2024-03-14 DIAGNOSIS — K66.1 HEMOPERITONEUM: ICD-10-CM

## 2024-03-14 DIAGNOSIS — Z48.89 ENCOUNTER FOR POSTOPERATIVE CARE: Primary | ICD-10-CM

## 2024-03-14 PROCEDURE — 1036F TOBACCO NON-USER: CPT | Performed by: OBSTETRICS & GYNECOLOGY

## 2024-03-14 PROCEDURE — 3074F SYST BP LT 130 MM HG: CPT | Performed by: OBSTETRICS & GYNECOLOGY

## 2024-03-14 PROCEDURE — 93970 EXTREMITY STUDY: CPT | Performed by: SURGERY

## 2024-03-14 PROCEDURE — 99024 POSTOP FOLLOW-UP VISIT: CPT | Performed by: OBSTETRICS & GYNECOLOGY

## 2024-03-14 PROCEDURE — 3078F DIAST BP <80 MM HG: CPT | Performed by: OBSTETRICS & GYNECOLOGY

## 2024-03-14 PROCEDURE — 93970 EXTREMITY STUDY: CPT

## 2024-03-14 NOTE — PROGRESS NOTES
"Subjective   Patient ID: Katarina Merino is a 43 y.o. female who presents for Post-op.    Postop check  Status post robotic LTH  Week after surgery with hemorrhage.  Was admitted twice  Hemoglobin on March 10 is now 12.8.  Is stable  Imaging on March 10 also show reduced size of hemoperitoneum  Will rescan in 2 months    ROS  All Normal Review of Systems  Constitutional: no fever, no chills, no recent weight gain, no recent weight loss and no fatigue.    Gastrointestinal: no abdominal pain, no constipation, no nausea, no diarrhea and no vomiting.    Genitourinary: no dysuria, no urinary incontinence, no vaginal dryness, no vaginal itching, no dyspareunia, no pelvic pain, no dysmenorrhea, no sexual problems, no change in urinary frequency, no vaginal discharge, no unexplained vaginal bleeding and no lesion/sore.    Breasts: No masses.  No nipple discharge.  No redness    Objective   /70   Ht 1.753 m (5' 9\")   Wt 132 kg (291 lb)   LMP 01/07/2024   BMI 42.97 kg/m²    Physical Exam  General: No distress.  Alert and oriented  Abdomen: Soft, nontender, nondistended  External Genitalia:  no masses.  no erythema.  no discharge noted.  Vagina:  no masses.  Cuff intact.  No  bleeding  Cervix: Absent  Uterus: Absent  Adnexa: R: no masses, non tender.    Adnexa: L: no masses.  non tender  Extremities: No swelling  Psych: No sadness.      Assessment/Plan   1) postop check  Is doing well postop  Was in the ER for pain and H&H is stable.  CT with smaller hemoperitoneum  Will reassess in 2 months  Pelvic rest encouraged for another month  Will follow-up after her CT with an annual exam     Path is benign  Will follow-up as needed or with her annual exam      Thank you for your visit to our office today.     "

## 2024-03-14 NOTE — LETTER
March 14, 2024     Patient: Katarina Merino   YOB: 1980   Date of Visit: 3/14/2024       To Whom It May Concern:    It is my medical opinion that Katarina Merino may return to full duty immediately with no restrictions as of 03/19/2024.    If you have any questions or concerns, please don't hesitate to call.         Sincerely,        Kenroy Naidu MD    CC: No Recipients

## 2024-03-22 ENCOUNTER — LAB (OUTPATIENT)
Dept: LAB | Facility: LAB | Age: 44
End: 2024-03-22
Payer: COMMERCIAL

## 2024-03-22 DIAGNOSIS — Z86.718 HISTORY OF DVT (DEEP VEIN THROMBOSIS): ICD-10-CM

## 2024-03-22 LAB — D DIMER PPP FEU-MCNC: 5485 NG/ML FEU

## 2024-03-22 PROCEDURE — 36415 COLL VENOUS BLD VENIPUNCTURE: CPT

## 2024-03-22 PROCEDURE — 85379 FIBRIN DEGRADATION QUANT: CPT

## 2024-03-28 ENCOUNTER — TELEMEDICINE (OUTPATIENT)
Dept: CARDIOLOGY | Facility: CLINIC | Age: 44
End: 2024-03-28
Payer: COMMERCIAL

## 2024-03-28 DIAGNOSIS — Z86.718 HISTORY OF DVT (DEEP VEIN THROMBOSIS): Primary | ICD-10-CM

## 2024-03-28 PROCEDURE — 1036F TOBACCO NON-USER: CPT | Performed by: INTERNAL MEDICINE

## 2024-03-28 PROCEDURE — 99212 OFFICE O/P EST SF 10 MIN: CPT | Performed by: INTERNAL MEDICINE

## 2024-03-28 NOTE — PROGRESS NOTES
Virtual or Telephone Consent     A telephone visit (audio only) between the patient (at the originating site) and the provider (at the distant site) was utilized to provide this telehealth service.   Verbal consent was requested and obtained from Katarina Merino on this date, 24 for a telehealth visit.        Chief Complaint:   No chief complaint on file.     History Of Present Illness:    Very pleasant 43 y.o. female presenting for a telephone follow up visit to review result of her recent vascular ultrasound. The patient reports that he has been feeling generally well since his hospitalization 2 months ago.  He denies back pain, leg pain and lower extremity edema. He also denies any new chest pain, shortness of breath, palpitations and lightheadedness. He states that he takes all of his medications as prescribed.       Venous Duplex ultrasound of bilateral lower extremities done on 3/14/24  Right Lower Venous: There are chronic changes visualized in the distal external iliac, popliteal, peroneal and posterior tibial veins. The remainder of the right leg is negative for deep vein thrombosis.   Left Lower Venous: No evidence of acute deep vein thrombus visualized in the left lower extremity.    D-dimer done on 3/22/24       Last Recorded Vitals:  N/A    Past Medical History:  She has a past medical history of DVT of lower extremity (deep venous thrombosis) (CMS/HCC), Fibroid, Hyperlipidemia, Hypertension, Iron deficiency anemia due to chronic blood loss, Long term current use of anticoagulant, Menorrhagia with regular cycle, Stroke (CMS/HCC) (), and Vision loss.    Past Surgical History:  She has a past surgical history that includes Umbilical hernia repair; CT angio head w and wo IV contrast (10/08/2019);  section, low transverse; Tonsillectomy; [place] tooth extraction; Laparoscopic hysterectomy; and Oophorectomy (Left).      Social History:  She reports that she has never smoked. She has never  used smokeless tobacco. She reports that she does not currently use alcohol after a past usage of about 2.0 standard drinks of alcohol per week. She reports that she does not use drugs.    Family History:  Family History   Problem Relation Name Age of Onset    Diabetes Mother      Hypertension Mother      Hypertension Father      Hyperlipidemia Father      Hypertension Brother      Hyperlipidemia Brother      Breast cancer Other Aunt         Allergies:  Penicillins    Outpatient Medications:  Current Outpatient Medications   Medication Instructions    amLODIPine (NORVASC) 10 mg, oral, Daily    ascorbic acid (Vitamin C) 250 mg tablet Take 1 tablet (250 mg) by mouth once daily.    atorvastatin (LIPITOR) 10 mg, oral, Daily    ferrous sulfate 300 mg (60 mg iron)/5 mL syrup 60 mg of iron, oral, Daily    hydroCHLOROthiazide (HYDRODIURIL) 25 mg, oral, Daily RT    labetalol (Normodyne) 300 mg tablet 1 tablet, oral, 2 times daily    multivitamin tablet Take 1 tablet by mouth once daily.       Physical Exam:  N/A      Last Labs:  CBC -  Lab Results   Component Value Date    WBC 9.4 02/18/2024    HGB 12.7 02/18/2024    HCT 41.5 02/18/2024    MCV 77 (L) 02/18/2024     (H) 02/18/2024       CMP -  Lab Results   Component Value Date    CALCIUM 9.9 02/18/2024    PHOS 3.6 02/04/2024    PROT 8.5 (H) 02/18/2024    ALBUMIN 4.1 02/18/2024    AST 18 02/18/2024    ALT 18 02/18/2024    ALKPHOS 85 02/18/2024    BILITOT 0.9 02/18/2024       LIPID PANEL -   Lab Results   Component Value Date    CHOL 248 (H) 10/09/2019    TRIG 121 10/09/2019    HDL 58.5 10/09/2019    CHHDL 4.2 10/09/2019    LDLF 165 (H) 10/09/2019    VLDL 24 10/09/2019       RENAL FUNCTION PANEL -   Lab Results   Component Value Date    GLUCOSE 103 (H) 02/18/2024     (L) 02/18/2024    K 4.8 02/18/2024     02/18/2024    CO2 24 02/18/2024    ANIONGAP 13 02/18/2024    BUN 10 02/18/2024    CREATININE 0.80 02/18/2024    CALCIUM 9.9 02/18/2024    PHOS 3.6  "02/04/2024    ALBUMIN 4.1 02/18/2024        Lab Results   Component Value Date    BNP 15 01/31/2024    HGBA1C 5.9 (H) 10/14/2023       Last Cardiology Tests:  ECG:  ECG 12 lead 02/18/2024      Echo:  No results found for this or any previous visit from the past 1095 days.      Ejection Fractions:  No results found for: \"EF\"    Cath:  No results found for this or any previous visit from the past 1095 days.      Stress Test:  No results found for this or any previous visit from the past 1095 days.      Cardiac Imaging:  No results found for this or any previous visit from the past 1095 days.        Lab review: I have Chemistry CMP:   Lab Results   Component Value Date    ALBUMIN 4.1 02/18/2024    CALCIUM 9.9 02/18/2024    CO2 24 02/18/2024    CREATININE 0.80 02/18/2024    GLUCOSE 103 (H) 02/18/2024    BILITOT 0.9 02/18/2024    PROT 8.5 (H) 02/18/2024    ALT 18 02/18/2024    AST 18 02/18/2024    ALKPHOS 85 02/18/2024   , Chemistry BMP   Lab Results   Component Value Date    GLUCOSE 103 (H) 02/18/2024    CALCIUM 9.9 02/18/2024    CO2 24 02/18/2024    CREATININE 0.80 02/18/2024   , CBC:  Lab Results   Component Value Date    WBC 9.4 02/18/2024    RBC 5.40 (H) 02/18/2024    HGB 12.7 02/18/2024    HCT 41.5 02/18/2024    MCV 77 (L) 02/18/2024    MCH 23.5 (L) 02/18/2024    MCHC 30.6 (L) 02/18/2024    RDW 17.2 (H) 02/18/2024    NRBC 0.0 02/18/2024   , Coags:   Lab Results   Component Value Date    APTT 29 02/04/2024    FIBRINOGEN 500 (H) 02/04/2024    INR 1.0 02/04/2024   , Lipids:   Lab Results   Component Value Date    CHOL 248 (H) 10/09/2019    HDL 58.5 10/09/2019    TRIG 121 10/09/2019   , and Cardiac Enzymes: No results found for: \"CKTOTAL\", \"CKMB\", \"CKMBINDEX\", \"TROPONINT\"  Diagnostic review: I have personally reviewed the result(s) of the     Assessment/Plan   -We discussed vascular risk factor modifications: HbA1C<7%, LDL<70 and /80 or less.   - Ordered lower extremity ultrasound in 3 months  -Ordered D-dimer in " 1 month.  -Continue current medical therapy.  -Follow up in 3 months to review results.  -Patient should proceed to the ED if he is experiencing sudden shortness of breath, chest pain or severe leg pain.       Scribe Attestation  By signing my name below, I Yumiko Johnoscar Gar , Scribe   attest that this documentation has been prepared under the direction and in the presence of Ming Ricketts MD.       Ming Ricketts MD

## 2024-05-06 ENCOUNTER — APPOINTMENT (OUTPATIENT)
Dept: RADIOLOGY | Facility: HOSPITAL | Age: 44
End: 2024-05-06
Payer: COMMERCIAL

## 2024-05-13 ENCOUNTER — HOSPITAL ENCOUNTER (OUTPATIENT)
Dept: RADIOLOGY | Facility: HOSPITAL | Age: 44
Discharge: HOME | End: 2024-05-13
Payer: COMMERCIAL

## 2024-05-13 DIAGNOSIS — K66.1 HEMOPERITONEUM: ICD-10-CM

## 2024-05-13 DIAGNOSIS — Z48.89 ENCOUNTER FOR POSTOPERATIVE CARE: ICD-10-CM

## 2024-05-13 PROCEDURE — 74177 CT ABD & PELVIS W/CONTRAST: CPT

## 2024-05-13 PROCEDURE — 74177 CT ABD & PELVIS W/CONTRAST: CPT | Performed by: RADIOLOGY

## 2024-05-13 PROCEDURE — 2550000001 HC RX 255 CONTRASTS: Performed by: OBSTETRICS & GYNECOLOGY

## 2024-05-13 RX ADMIN — IOHEXOL 75 ML: 350 INJECTION, SOLUTION INTRAVENOUS at 19:31

## 2024-05-20 ENCOUNTER — APPOINTMENT (OUTPATIENT)
Dept: OBSTETRICS AND GYNECOLOGY | Facility: CLINIC | Age: 44
End: 2024-05-20
Payer: COMMERCIAL

## 2024-07-18 ENCOUNTER — HOSPITAL ENCOUNTER (OUTPATIENT)
Dept: VASCULAR MEDICINE | Facility: CLINIC | Age: 44
Discharge: HOME | End: 2024-07-18
Payer: COMMERCIAL

## 2024-07-18 DIAGNOSIS — Z86.718 HISTORY OF DVT (DEEP VEIN THROMBOSIS): ICD-10-CM

## 2024-07-18 DIAGNOSIS — M79.89 OTHER SPECIFIED SOFT TISSUE DISORDERS: ICD-10-CM

## 2024-07-18 PROCEDURE — 93970 EXTREMITY STUDY: CPT

## 2024-07-18 PROCEDURE — 93970 EXTREMITY STUDY: CPT | Performed by: SURGERY

## 2024-09-09 ENCOUNTER — APPOINTMENT (OUTPATIENT)
Dept: GYNECOLOGIC ONCOLOGY | Facility: CLINIC | Age: 44
End: 2024-09-09
Payer: COMMERCIAL

## 2024-09-18 ENCOUNTER — HOSPITAL ENCOUNTER (OUTPATIENT)
Dept: RADIOLOGY | Facility: HOSPITAL | Age: 44
Discharge: HOME | End: 2024-09-18
Payer: COMMERCIAL

## 2024-09-18 ENCOUNTER — HOSPITAL ENCOUNTER (OUTPATIENT)
Dept: RADIOLOGY | Facility: CLINIC | Age: 44
End: 2024-09-18
Payer: COMMERCIAL

## 2024-09-18 DIAGNOSIS — R87.69: ICD-10-CM

## 2024-09-18 PROCEDURE — 76856 US EXAM PELVIC COMPLETE: CPT

## 2024-09-18 PROCEDURE — 76830 TRANSVAGINAL US NON-OB: CPT | Performed by: RADIOLOGY

## 2024-09-18 PROCEDURE — 76857 US EXAM PELVIC LIMITED: CPT | Performed by: RADIOLOGY

## 2024-09-30 ENCOUNTER — LAB (OUTPATIENT)
Dept: LAB | Facility: LAB | Age: 44
End: 2024-09-30
Payer: COMMERCIAL

## 2024-09-30 ENCOUNTER — OFFICE VISIT (OUTPATIENT)
Dept: GYNECOLOGIC ONCOLOGY | Facility: CLINIC | Age: 44
End: 2024-09-30
Payer: COMMERCIAL

## 2024-09-30 VITALS
RESPIRATION RATE: 16 BRPM | OXYGEN SATURATION: 98 % | TEMPERATURE: 97.3 F | WEIGHT: 293 LBS | BODY MASS INDEX: 45.61 KG/M2 | SYSTOLIC BLOOD PRESSURE: 136 MMHG | DIASTOLIC BLOOD PRESSURE: 85 MMHG | HEART RATE: 65 BPM

## 2024-09-30 DIAGNOSIS — Z86.718 HISTORY OF DVT (DEEP VEIN THROMBOSIS): ICD-10-CM

## 2024-09-30 DIAGNOSIS — R87.69: Primary | ICD-10-CM

## 2024-09-30 DIAGNOSIS — R87.69: ICD-10-CM

## 2024-09-30 LAB — D DIMER PPP FEU-MCNC: 4419 NG/ML FEU

## 2024-09-30 PROCEDURE — 99214 OFFICE O/P EST MOD 30 MIN: CPT | Performed by: OBSTETRICS & GYNECOLOGY

## 2024-09-30 PROCEDURE — 1036F TOBACCO NON-USER: CPT | Performed by: OBSTETRICS & GYNECOLOGY

## 2024-09-30 PROCEDURE — 86336 INHIBIN A: CPT

## 2024-09-30 PROCEDURE — 36415 COLL VENOUS BLD VENIPUNCTURE: CPT

## 2024-09-30 PROCEDURE — 3079F DIAST BP 80-89 MM HG: CPT | Performed by: OBSTETRICS & GYNECOLOGY

## 2024-09-30 PROCEDURE — 83520 IMMUNOASSAY QUANT NOS NONAB: CPT

## 2024-09-30 PROCEDURE — 3075F SYST BP GE 130 - 139MM HG: CPT | Performed by: OBSTETRICS & GYNECOLOGY

## 2024-09-30 PROCEDURE — 85379 FIBRIN DEGRADATION QUANT: CPT

## 2024-09-30 ASSESSMENT — PAIN SCALES - GENERAL: PAINLEVEL: 0-NO PAIN

## 2024-09-30 NOTE — PROGRESS NOTES
Patient ID: Katarina Merino is a 44 y.o. female.  Referring Physician: No referring provider defined for this encounter.  Primary Care Provider: Lesly Mary DO    Subjective    44 y.o. referred for pathology review           Pathology Hx  26 patient underwent total hysterectomy bilateral salpingectomy, left oopherectomy performed robotically, final pathology report identified bilateral fallopian tube with microscopic foci of sex cord proliferation, these foci were positive for SF1 and inhibin and negative for P40 and P53, pathogenesis of these proliferations is uncertain and spread from a granulosa cell tumor cannot be entirely excluded. Of note patient had complicated p/o course including rehospitalization with bleeding (she did not require reoperation)  TB recs for close surveillance     PMH  Obesity  H/o DVT  H/o stroke - occurred post partum     PSH  Hysterectomy 26 secondary to fibroids     Ob/Gyn hx   x1  x2 vaginal deliveries     SH  Lives with her  and children and is employed by city of [place]  Denies tobacco, occasional alcohol use, denies illicit drug use.     FH  Cousin and aunt Hx of breast cancer  No family history of uterine, ovarian or colon cancer.  US with corpus luteum on R ovary    Feeling well  No bleeding  No pain symptoms    Eating well    Has not been hospitalized  Not on anticoagulation          Objective    BSA: 2.61 meters squared  /85 (BP Location: Right arm, Patient Position: Sitting, BP Cuff Size: Adult)   Pulse 65   Temp 36.3 °C (97.3 °F) (Temporal)   Resp 16   Wt 140 kg (308 lb 13.8 oz)   LMP 2024   SpO2 98%   BMI 45.61 kg/m²      Physical Exam  Constitutional:       Appearance: Normal appearance.   Cardiovascular:      Rate and Rhythm: Normal rate.      Heart sounds: Normal heart sounds.   Pulmonary:      Effort: Pulmonary effort is normal.      Breath sounds: Normal breath sounds.   Abdominal:      General: Abdomen is flat. There  is no distension.      Palpations: There is no mass.      Tenderness: There is no abdominal tenderness.   Genitourinary:     General: Normal vulva.      Exam position: Lithotomy position.      Uterus: Absent.       Comments: Cervix and uterine surgically absent.  No lesions involving the vaginal cuff.  No masses on bimanual or rectovaginal exam  Musculoskeletal:      Cervical back: Normal range of motion.   Skin:     General: Skin is warm and dry.   Neurological:      Mental Status: She is alert.   Psychiatric:         Mood and Affect: Mood normal.         Behavior: Behavior normal.         Performance Status:  Asymptomatic    Assessment/Plan     Oncology History    No history exists.        Problem List Items Addressed This Visit             ICD-10-CM    Abnormal cytological findings in female genital organs - Primary R87.69    Relevant Orders    US pelvis transvaginal       Treatment Plans       No treatment plans exist        US reviewed  Discussed plans for surveillance.  Follow up inhibin A/B levels, RN to call with results.  Pelvic US in 12 months, office visit to follow.     I spent 15 min in telephone discussion with patient.

## 2024-10-02 LAB — INHIBIN A SERPL-MCNC: 9.1 PG/ML

## 2024-10-03 LAB — INHIBIN B SERPL-MCNC: 95 PG/ML

## 2024-10-07 ENCOUNTER — TELEPHONE (OUTPATIENT)
Dept: GYNECOLOGIC ONCOLOGY | Facility: HOSPITAL | Age: 44
End: 2024-10-07
Payer: COMMERCIAL

## 2024-10-07 NOTE — TELEPHONE ENCOUNTER
Phoned patient to notify that Inhibin A and B are normal.    Message left on patients voice mail with normal results.

## 2025-09-02 ENCOUNTER — HOSPITAL ENCOUNTER (OUTPATIENT)
Dept: RADIOLOGY | Facility: HOSPITAL | Age: 45
Discharge: HOME | End: 2025-09-02
Payer: COMMERCIAL

## 2025-09-02 DIAGNOSIS — R87.69: ICD-10-CM

## 2025-09-02 PROCEDURE — 76830 TRANSVAGINAL US NON-OB: CPT

## (undated) DEVICE — NEEDLE, INSUFFLATION, 13GAX120MM, DISP

## (undated) DEVICE — OBTURATOR, BLADELESS  8MM

## (undated) DEVICE — DRAPE, COLUMN, DAVINCI XI

## (undated) DEVICE — SUTURE, MONOCRYL, 4-0, 18 IN, PS2, UNDYED

## (undated) DEVICE — PUMP, STRYKERFLOW 2 & HANDPIECE W/10FT. IRRIGATION TUBING

## (undated) DEVICE — POSITIONING, THE PINK PAD, PIGAZZI SYSTEM

## (undated) DEVICE — DRAPE, SURGICAL, OB/GYN

## (undated) DEVICE — OBTURATOR, BLADELESS , SU

## (undated) DEVICE — ACCESS SYS, KII SHIELDED BLADED, Z-THREAD, 5X100CM

## (undated) DEVICE — SHEARS, CURVED 5MM, ENDOPATH

## (undated) DEVICE — SYRINGE, 60 CC, IRRIGATION, BULB, CONTRO-BULB, PAPER POUCH

## (undated) DEVICE — TUBESET, HIGH FLOW II, HEATED, W/RTP, PNEUMO SURE

## (undated) DEVICE — FORCEPS, PROGRASP, DAVINCI XI

## (undated) DEVICE — Device

## (undated) DEVICE — LUBRICANT, ELECTROLUBE, F/ELECTRODE TIPS

## (undated) DEVICE — CARE KIT, LAPAROSCOPIC, ADVANCED

## (undated) DEVICE — GOWN, SURGICAL, SMARTGOWN, XLARGE, STERILE

## (undated) DEVICE — TRAY, SURESTEP, URINE METER, 16FR, COMPLETE, W/STATLOCK

## (undated) DEVICE — HEMOSTAT, ARISTA, ABSORBABLE, 3 GRAMS

## (undated) DEVICE — SUTURE, V-LOC, 2-0, 12IN, GS-21, GR 180 ABS

## (undated) DEVICE — GLOVE, SURGICAL, PROTEXIS PI , 5.5, PF, LF

## (undated) DEVICE — NERVE BLOCK, STIMUQUIK, SINGLE-SHOT, 21GA X 3-1/2

## (undated) DEVICE — APPLICATOR, ARISTA, FLEXITIP, XL, LF

## (undated) DEVICE — DRAPE, ARM XI

## (undated) DEVICE — ADHESIVE, SKIN, LIQUIBAND EXCEED

## (undated) DEVICE — TROCAR, OPTICAL BLADELESS 5MM X 100 W/ADVANCED FIXATION

## (undated) DEVICE — DRIVER, MEGA NEEDLE

## (undated) DEVICE — HOOK, CAUTERY MONOPOLAR PERM XI

## (undated) DEVICE — SEAL, UNIVERSAL 5-8MM  XI

## (undated) DEVICE — SOLUTION, INJECTION, USP, SODIUM CHLORIDE 0.9%, .9, NACL, 1000 ML, BAG